# Patient Record
Sex: MALE | Race: WHITE | NOT HISPANIC OR LATINO | Employment: OTHER | URBAN - METROPOLITAN AREA
[De-identification: names, ages, dates, MRNs, and addresses within clinical notes are randomized per-mention and may not be internally consistent; named-entity substitution may affect disease eponyms.]

---

## 2017-11-06 ENCOUNTER — HOSPITAL ENCOUNTER (EMERGENCY)
Facility: MEDICAL CENTER | Age: 67
End: 2017-11-06
Attending: EMERGENCY MEDICINE
Payer: MEDICARE

## 2017-11-06 VITALS
RESPIRATION RATE: 16 BRPM | SYSTOLIC BLOOD PRESSURE: 132 MMHG | OXYGEN SATURATION: 97 % | DIASTOLIC BLOOD PRESSURE: 85 MMHG | HEART RATE: 95 BPM | HEIGHT: 69 IN | BODY MASS INDEX: 23.67 KG/M2 | WEIGHT: 159.83 LBS | TEMPERATURE: 98 F

## 2017-11-06 DIAGNOSIS — Z76.0 MEDICATION REFILL: ICD-10-CM

## 2017-11-06 PROCEDURE — 99284 EMERGENCY DEPT VISIT MOD MDM: CPT

## 2017-11-06 RX ORDER — TRAZODONE HYDROCHLORIDE 150 MG/1
150 TABLET ORAL EVERY EVENING
Qty: 15 TAB | Refills: 0 | Status: SHIPPED | OUTPATIENT
Start: 2017-11-06

## 2017-11-06 ASSESSMENT — PAIN SCALES - GENERAL: PAINLEVEL_OUTOF10: 0

## 2017-11-06 NOTE — ED PROVIDER NOTES
"ED Provider Note    CHIEF COMPLAINT  Chief Complaint   Patient presents with   • Medication Refill     Patient states \"I need to take my Trazodone twice a day\"       HPI  Morgan Davis is a 67 y.o. male who presents requesting refill on his trazodone.  He states he has been taking it twice a day which helps \"both his head and his back\".  He denies adverse side effects from this, states he is talking to his primary doctor regarding having the official prescription switched to twice a day.  No other complaints at this time.  He denies depression or suicidal ideation at this time.    REVIEW OF SYSTEMS  Constitutional: No fever  Respiratory: No shortness of breath  Psychiatric denies suicidal ideation  Gastrointestinal: No abdominal pain  Musculoskeletal: No back pain    PAST MEDICAL HISTORY  Past Medical History:   Diagnosis Date   • Hyperlipidemia    • Hypertension    • Schizophrenia (CMS-HCC)    • Thyroid disease        FAMILY HISTORY  History reviewed. No pertinent family history.    SOCIAL HISTORY  Social History     Social History   • Marital status: Single     Spouse name: N/A   • Number of children: N/A   • Years of education: N/A     Social History Main Topics   • Smoking status: Current Every Day Smoker     Packs/day: 1.00   • Smokeless tobacco: Never Used   • Alcohol use No   • Drug use: No   • Sexual activity: Not on file     Other Topics Concern   • Not on file     Social History Narrative   • No narrative on file       SURGICAL HISTORY  History reviewed. No pertinent surgical history.    CURRENT MEDICATIONS  No current facility-administered medications on file prior to encounter.      Current Outpatient Prescriptions on File Prior to Encounter   Medication Sig Dispense Refill   • acetaminophen 650 MG Tab Take 650 mg by mouth every 6 hours as needed (Mild Pain; (Pain scale 1-3); Temp greater than 100.5 F). 30 Tab 0   • aspirin (ASA) 81 MG Chew Tab chewable tablet Take 1 Tab by mouth every day. 100 Tab 11 " "  • cyanocobalamin (VITAMIN B12) 1000 MCG Tab Take 1 Tab by mouth every day. 30 Tab 3   • multivitamin (THERAGRAN) Tab Take 1 Tab by mouth every day. 30 Tab    • vitamin D, Ergocalciferol, (DRISDOL) 99733 UNITS Cap capsule Take 1 Cap by mouth every 7 days. 30 Cap    • levothyroxine (SYNTHROID) 75 MCG Tab Take 37.5 mcg by mouth Every morning on an empty stomach.     • famotidine (PEPCID) 20 MG Tab Take 20 mg by mouth 2 times a day.     • docusate sodium (COLACE) 100 MG Cap Take 100 mg by mouth 2 times a day.     • thiamine (THIAMINE) 100 MG Tab Take 100 mg by mouth every day.     • trazodone (DESYREL) 150 MG TABS Take 150 mg by mouth every bedtime.     • lisinopril (PRINIVIL) 10 MG TABS Take 10 mg by mouth every day.     • olanzapine (ZYPREXA) 20 MG tablet Take 20 mg by mouth every bedtime.         ALLERGIES  Allergies   Allergen Reactions   • Nkda [No Known Drug Allergy]        PHYSICAL EXAM  VITAL SIGNS: /85   Pulse 95   Temp 36.7 °C (98 °F) (Temporal)   Resp 16   Ht 1.753 m (5' 9\")   Wt 72.5 kg (159 lb 13.3 oz)   SpO2 97%   BMI 23.60 kg/m²   Constitutional:  Well nourished, No acute distress.   HENT:Atraumatic  Eyes:  Conjunctiva normal, No discharge.    Cardiovascular: The heart is regular, no murmurs  Pulmonary: Lungs are clear with good air movement, no wheezing or rales  Skin: No cyanosis.   Musculoskeletal: Neck nontender   Neurologic: speech is clear, no ataxia   Psychiatric:  Mood normal.  Patient, and cooperative      COURSE & MEDICAL DECISION MAKING  Pertinent Labs & Imaging studies reviewed. (See chart for details)  Patient given refill for 2 weeks of his current trazodone prescription, advised to follow up his primary doctor for discussion of once or twice a day dosing.    FINAL IMPRESSION     1. Medication refill                 Electronically signed by: Gary Sherman, 11/6/2017 1:40 PM    "

## 2017-11-06 NOTE — ED NOTES
".  Chief Complaint   Patient presents with   • Medication Refill     Patient states \"I need to take my Trazodone twice a day\"     ./87   Pulse (!) 114   Temp 36.7 °C (98 °F) (Temporal)   Resp 14   Ht 1.753 m (5' 9\")   Wt 72.5 kg (159 lb 13.3 oz)   SpO2 97%   BMI 23.60 kg/m²     BIB EMS for medication change request  "

## 2017-11-06 NOTE — ED NOTES
Pt amb to room w/o difficulty, adamant that he needs his trazadone twice a day. Repeatedly stating so

## 2018-01-18 ENCOUNTER — HOSPITAL ENCOUNTER (OUTPATIENT)
Dept: LAB | Facility: MEDICAL CENTER | Age: 68
End: 2018-01-18
Attending: PSYCHIATRY & NEUROLOGY
Payer: MEDICARE

## 2018-01-18 LAB
ALBUMIN SERPL BCP-MCNC: 4.2 G/DL (ref 3.2–4.9)
ALBUMIN/GLOB SERPL: 1.5 G/DL
ALP SERPL-CCNC: 90 U/L (ref 30–99)
ALT SERPL-CCNC: 16 U/L (ref 2–50)
ANION GAP SERPL CALC-SCNC: 5 MMOL/L (ref 0–11.9)
AST SERPL-CCNC: 18 U/L (ref 12–45)
BASOPHILS # BLD AUTO: 0.5 % (ref 0–1.8)
BASOPHILS # BLD: 0.04 K/UL (ref 0–0.12)
BILIRUB SERPL-MCNC: 0.7 MG/DL (ref 0.1–1.5)
BUN SERPL-MCNC: 16 MG/DL (ref 8–22)
CALCIUM SERPL-MCNC: 10.1 MG/DL (ref 8.5–10.5)
CHLORIDE SERPL-SCNC: 104 MMOL/L (ref 96–112)
CHOLEST SERPL-MCNC: 168 MG/DL (ref 100–199)
CO2 SERPL-SCNC: 25 MMOL/L (ref 20–33)
CREAT SERPL-MCNC: 0.99 MG/DL (ref 0.5–1.4)
EOSINOPHIL # BLD AUTO: 0.17 K/UL (ref 0–0.51)
EOSINOPHIL NFR BLD: 2.1 % (ref 0–6.9)
ERYTHROCYTE [DISTWIDTH] IN BLOOD BY AUTOMATED COUNT: 46.9 FL (ref 35.9–50)
EST. AVERAGE GLUCOSE BLD GHB EST-MCNC: 117 MG/DL
GLOBULIN SER CALC-MCNC: 2.8 G/DL (ref 1.9–3.5)
GLUCOSE SERPL-MCNC: 110 MG/DL (ref 65–99)
HBA1C MFR BLD: 5.7 % (ref 0–5.6)
HCT VFR BLD AUTO: 53.9 % (ref 42–52)
HDLC SERPL-MCNC: 65 MG/DL
HGB BLD-MCNC: 18.3 G/DL (ref 14–18)
IMM GRANULOCYTES # BLD AUTO: 0.02 K/UL (ref 0–0.11)
IMM GRANULOCYTES NFR BLD AUTO: 0.2 % (ref 0–0.9)
LDLC SERPL CALC-MCNC: 88 MG/DL
LYMPHOCYTES # BLD AUTO: 2.38 K/UL (ref 1–4.8)
LYMPHOCYTES NFR BLD: 29.1 % (ref 22–41)
MCH RBC QN AUTO: 31.9 PG (ref 27–33)
MCHC RBC AUTO-ENTMCNC: 34 G/DL (ref 33.7–35.3)
MCV RBC AUTO: 94.1 FL (ref 81.4–97.8)
MONOCYTES # BLD AUTO: 0.65 K/UL (ref 0–0.85)
MONOCYTES NFR BLD AUTO: 7.9 % (ref 0–13.4)
NEUTROPHILS # BLD AUTO: 4.92 K/UL (ref 1.82–7.42)
NEUTROPHILS NFR BLD: 60.2 % (ref 44–72)
NRBC # BLD AUTO: 0 K/UL
NRBC BLD-RTO: 0 /100 WBC
PLATELET # BLD AUTO: 177 K/UL (ref 164–446)
PMV BLD AUTO: 10.2 FL (ref 9–12.9)
POTASSIUM SERPL-SCNC: 4.7 MMOL/L (ref 3.6–5.5)
PROT SERPL-MCNC: 7 G/DL (ref 6–8.2)
RBC # BLD AUTO: 5.73 M/UL (ref 4.7–6.1)
SODIUM SERPL-SCNC: 134 MMOL/L (ref 135–145)
T4 FREE SERPL-MCNC: 1.55 NG/DL (ref 0.53–1.43)
TRIGL SERPL-MCNC: 75 MG/DL (ref 0–149)
TSH SERPL DL<=0.005 MIU/L-ACNC: 1.33 UIU/ML (ref 0.38–5.33)
WBC # BLD AUTO: 8.2 K/UL (ref 4.8–10.8)

## 2018-01-18 PROCEDURE — 80061 LIPID PANEL: CPT | Mod: GA

## 2018-01-18 PROCEDURE — 85025 COMPLETE CBC W/AUTO DIFF WBC: CPT

## 2018-01-18 PROCEDURE — 83036 HEMOGLOBIN GLYCOSYLATED A1C: CPT | Mod: GA

## 2018-01-18 PROCEDURE — 36415 COLL VENOUS BLD VENIPUNCTURE: CPT

## 2018-01-18 PROCEDURE — 84439 ASSAY OF FREE THYROXINE: CPT | Mod: GA

## 2018-01-18 PROCEDURE — 80053 COMPREHEN METABOLIC PANEL: CPT

## 2018-01-18 PROCEDURE — 84443 ASSAY THYROID STIM HORMONE: CPT | Mod: GA

## 2019-09-18 ENCOUNTER — TELEPHONE (OUTPATIENT)
Dept: HEMATOLOGY ONCOLOGY | Facility: MEDICAL CENTER | Age: 69
End: 2019-09-18

## 2019-09-18 NOTE — TELEPHONE ENCOUNTER
Received referral to lung cancer screening program.  Chart review to assess for lung cancer screening program eligibility.   1. Age 55-77 yrs of age? Yes 68 y.o.  2. 30 pack year hx of smoking, or greater? Yes 1 [id number]= [id number] hx  3. Current smoker or if quit, has pt quit within last 15 yrs?Yes  Current smoker  4. Any signs or symptoms of lung cancer? None noted  5. Previous history of lung cancer? None noted  6. Chest CT within past 12 mos.? None noted  Patient does meet eligibility criteria. [facility] scheduling notified to schedule the shared decision making visit.

## 2019-09-26 ENCOUNTER — APPOINTMENT (OUTPATIENT)
Dept: HEMATOLOGY ONCOLOGY | Facility: MEDICAL CENTER | Age: 69
End: 2019-09-26
Payer: MEDICARE

## 2019-09-26 ENCOUNTER — TELEPHONE (OUTPATIENT)
Dept: HEMATOLOGY ONCOLOGY | Facility: MEDICAL CENTER | Age: 69
End: 2019-09-26

## 2019-09-26 NOTE — TELEPHONE ENCOUNTER
Patient's , Simone Rider called asking about Morgan's appointment today. Asked to speak directly with patient. Patient asked for clarification of what his appointment was for and asked to be rescheduled to a morning appointment. Patient does not have transportation in the afternoons. Moved patient's appointment to the morning of 10/1/19

## 2019-10-01 ENCOUNTER — OFFICE VISIT (OUTPATIENT)
Dept: HEMATOLOGY ONCOLOGY | Facility: MEDICAL CENTER | Age: 69
End: 2019-10-01
Payer: MEDICARE

## 2019-10-01 VITALS
BODY MASS INDEX: 20.33 KG/M2 | HEART RATE: 92 BPM | SYSTOLIC BLOOD PRESSURE: 122 MMHG | RESPIRATION RATE: 16 BRPM | WEIGHT: 141.98 LBS | DIASTOLIC BLOOD PRESSURE: 74 MMHG | HEIGHT: 70 IN | OXYGEN SATURATION: 95 % | TEMPERATURE: 99 F

## 2019-10-01 DIAGNOSIS — F17.210 CIGARETTE SMOKER: ICD-10-CM

## 2019-10-01 PROCEDURE — G0296 VISIT TO DETERM LDCT ELIG: HCPCS | Performed by: NURSE PRACTITIONER

## 2019-10-01 RX ORDER — FOLIC ACID 1 MG/1
1 TABLET ORAL EVERY MORNING
Status: ON HOLD | COMMUNITY
End: 2021-06-30

## 2019-10-01 RX ORDER — SIMVASTATIN 20 MG
20 TABLET ORAL NIGHTLY
COMMUNITY
End: 2021-06-20

## 2019-10-01 RX ORDER — TIOTROPIUM BROMIDE 18 UG/1
18 CAPSULE ORAL; RESPIRATORY (INHALATION) DAILY
Status: ON HOLD | COMMUNITY
End: 2021-06-30

## 2019-10-01 ASSESSMENT — ENCOUNTER SYMPTOMS
WEIGHT LOSS: 0
HEMOPTYSIS: 0
SHORTNESS OF BREATH: 1
COUGH: 1
WHEEZING: 1
SPUTUM PRODUCTION: 0

## 2019-10-01 ASSESSMENT — PAIN SCALES - GENERAL: PAINLEVEL: NO PAIN

## 2019-10-01 NOTE — PROGRESS NOTES
Subjective:      Morgan Davis is a 68 y.o. male who presents for Lung Cancer Screening Program Prescreen (Dx: Nicotine Dependence Ref: Nia Gibson MD) for lung cancer screening shared decision making visit.       HPI   Patient seen today for initial lung cancer screening visit. Patient referred by PCP, Dr. Nia Ivory.  Patient is accompanied by caregiver from his group home.    The patient meets eligibility criteria including age, smoking history (30+ pack years), if former smoker, quit in the last 15 years, and absence of signs or symptoms of lung cancer.    - Age - 68  - Smoking history - Patient has smoked for 54 years at an average of 1 ppd = 54 pack year smoking history.  - Current smoking status - current smoker  - No symptoms of lung cancer and no previous history of lung cancer         Allergies   Allergen Reactions   • Nkda [No Known Drug Allergy]          Current Outpatient Medications on File Prior to Visit   Medication Sig Dispense Refill   • folic acid (FOLVITE) 1 MG Tab Take 1 mg by mouth every day.     • simvastatin (ZOCOR) 20 MG Tab Take 20 mg by mouth every evening.     • tiotropium (SPIRIVA HANDIHALER) 18 MCG Cap Inhale 18 mcg by mouth every day.     • trazodone (DESYREL) 150 MG Tab Take 1 Tab by mouth every evening. 15 Tab 0   • aspirin (ASA) 81 MG Chew Tab chewable tablet Take 1 Tab by mouth every day. 100 Tab 11   • cyanocobalamin (VITAMIN B12) 1000 MCG Tab Take 1 Tab by mouth every day. 30 Tab 3   • multivitamin (THERAGRAN) Tab Take 1 Tab by mouth every day. 30 Tab    • levothyroxine (SYNTHROID) 75 MCG Tab Take 37.5 mcg by mouth Every morning on an empty stomach.     • famotidine (PEPCID) 20 MG Tab Take 20 mg by mouth 2 times a day.     • docusate sodium (COLACE) 100 MG Cap Take 100 mg by mouth 2 times a day.     • trazodone (DESYREL) 150 MG TABS Take 150 mg by mouth every bedtime.     • lisinopril (PRINIVIL) 10 MG TABS Take 5 mg by mouth every day.     • olanzapine (ZYPREXA) 20 MG  "tablet Take 15 mg by mouth every bedtime.     • acetaminophen 650 MG Tab Take 650 mg by mouth every 6 hours as needed (Mild Pain; (Pain scale 1-3); Temp greater than 100.5 F). 30 Tab 0   • vitamin D, Ergocalciferol, (DRISDOL) 68376 UNITS Cap capsule Take 1 Cap by mouth every 7 days. 30 Cap    • thiamine (THIAMINE) 100 MG Tab Take 100 mg by mouth every day.       No current facility-administered medications on file prior to visit.            Review of Systems   Constitutional: Negative for malaise/fatigue and weight loss.   Respiratory: Positive for cough, shortness of breath (with exertion) and wheezing (clears with cough). Negative for hemoptysis and sputum production.           Objective:     /74 (BP Location: Right arm, Patient Position: Sitting, BP Cuff Size: Adult)   Pulse 92   Temp 37.2 °C (99 °F) (Temporal)   Resp 16   Ht 1.778 m (5' 10\")   Wt 64.4 kg (141 lb 15.6 oz)   SpO2 95%   BMI 20.37 kg/m²        Physical Exam   Constitutional: He is oriented to person, place, and time. He appears well-developed and well-nourished. No distress.   Cardiovascular: Normal rate, regular rhythm and normal heart sounds. Exam reveals no gallop and no friction rub.   No murmur heard.  Pulmonary/Chest: Effort normal. No respiratory distress. He has wheezes (RUL - cleared with cough).   Musculoskeletal: Normal range of motion.   Neurological: He is alert and oriented to person, place, and time.   Skin: Skin is warm and dry. He is not diaphoretic.   Vitals reviewed.         Assessment/Plan:     1. Cigarette smoker  CT-LUNG CANCER-SCREENING         We conducted a shared decision-making process using a decision aid. We reviewed benefits and harms of screening, including false positives and potential need for additional diagnostic testing, the possibility of over diagnosis, and total radiation exposure.    We discussed the importance of adhering to annual LDCT screening. We also discussed the impact of comorbities on " the patient's the ability or willingness to undergo diagnostic procedure(s) and treatment.    Counseling on the importance of maintaining cigarette smoking abstinence if former smoker; or the importance of smoking cessation if current smoker and, if appropriate, furnishing of information about tobacco cessation interventions. I provided patient with smoking cessation materials and resources within [facility] and the community. Patient appreciative of the resources.     Based on our discussion, we have decided to begin annual lung cancer screening starting now.

## 2020-05-04 ENCOUNTER — NON-PROVIDER VISIT (OUTPATIENT)
Dept: URGENT CARE | Facility: PHYSICIAN GROUP | Age: 70
End: 2020-05-04

## 2020-05-04 DIAGNOSIS — Z11.1 PPD SCREENING TEST: Primary | ICD-10-CM

## 2020-05-04 PROCEDURE — 86580 TB INTRADERMAL TEST: CPT | Performed by: NURSE PRACTITIONER

## 2020-05-05 NOTE — NON-PROVIDER
Morgan Davis is a 69 y.o. male here for a non-provider visit for PPD placement -- Step 1 of 1    Reason for PPD:  group home requirement    1. TB evaluation questionnaire completed by patient? Yes      -  If any answers marked yes did you contact a provider prior to placing? Not Indicated  2.  Patient notified to return to clinic for reading on: 5/6/2020 Weds or 5/7/2020 Thurs  3.  PPD Placement documentation completed on TB evaluation questionnaire? Yes  4.  Location of TB evaluation questionnaire filed:

## 2020-05-06 ENCOUNTER — NON-PROVIDER VISIT (OUTPATIENT)
Dept: URGENT CARE | Facility: PHYSICIAN GROUP | Age: 70
End: 2020-05-06

## 2020-05-06 LAB — TB WHEAL 3D P 5 TU DIAM: NORMAL MM

## 2020-05-13 ENCOUNTER — NON-PROVIDER VISIT (OUTPATIENT)
Dept: URGENT CARE | Facility: PHYSICIAN GROUP | Age: 70
End: 2020-05-13

## 2020-05-13 DIAGNOSIS — Z11.1 PPD SCREENING TEST: Primary | ICD-10-CM

## 2020-05-13 PROCEDURE — 86580 TB INTRADERMAL TEST: CPT | Performed by: NURSE PRACTITIONER

## 2020-05-13 NOTE — NON-PROVIDER
Morgan Davis is a 69 y.o. male here for a non-provider visit for PPD placement -- Step 2 of 2    Reason for PPD:  nursing home requirement    1. TB evaluation questionnaire completed by patient? Yes      -  If any answers marked yes did you contact a provider prior to placing? Not Indicated  2.  Patient notified to return to clinic for reading on: 5/15/2020 Fri or 5/16/2020 Sat   3.  PPD Placement documentation completed on TB evaluation questionnaire? Yes  4.  Location of TB evaluation questionnaire filed:

## 2020-05-16 ENCOUNTER — NON-PROVIDER VISIT (OUTPATIENT)
Dept: URGENT CARE | Facility: PHYSICIAN GROUP | Age: 70
End: 2020-05-16

## 2020-05-16 LAB — TB WHEAL 3D P 5 TU DIAM: 0 MM

## 2020-05-16 NOTE — NON-PROVIDER
Morgan Davis is a 69 y.o. male here for a non-provider visit for PPD reading -- Step 2 of 2.      1.  Resulted in Epic under enter/edit results? Yes   2.  TB evaluation questionnaire scanned into chart and original given to patient?Yes      3. Was induration greater than 0 mm? No.      Routed to PCP? No

## 2021-01-15 DIAGNOSIS — Z23 NEED FOR VACCINATION: ICD-10-CM

## 2021-04-12 ENCOUNTER — NON-PROVIDER VISIT (OUTPATIENT)
Dept: OCCUPATIONAL MEDICINE | Facility: CLINIC | Age: 71
End: 2021-04-12

## 2021-04-12 DIAGNOSIS — Z11.1 ENCOUNTER FOR PPD TEST: Primary | ICD-10-CM

## 2021-04-12 PROCEDURE — 86580 TB INTRADERMAL TEST: CPT | Performed by: NURSE PRACTITIONER

## 2021-04-14 ENCOUNTER — NON-PROVIDER VISIT (OUTPATIENT)
Dept: OCCUPATIONAL MEDICINE | Facility: CLINIC | Age: 71
End: 2021-04-14

## 2021-04-14 DIAGNOSIS — Z11.1 ENCOUNTER FOR PPD SKIN TEST READING: ICD-10-CM

## 2021-04-14 LAB — TB WHEAL 3D P 5 TU DIAM: NORMAL MM

## 2021-06-20 ENCOUNTER — APPOINTMENT (OUTPATIENT)
Dept: RADIOLOGY | Facility: MEDICAL CENTER | Age: 71
DRG: 023 | End: 2021-06-20
Attending: STUDENT IN AN ORGANIZED HEALTH CARE EDUCATION/TRAINING PROGRAM
Payer: MEDICARE

## 2021-06-20 ENCOUNTER — APPOINTMENT (OUTPATIENT)
Dept: RADIOLOGY | Facility: MEDICAL CENTER | Age: 71
DRG: 023 | End: 2021-06-20
Attending: EMERGENCY MEDICINE
Payer: MEDICARE

## 2021-06-20 ENCOUNTER — APPOINTMENT (OUTPATIENT)
Dept: RADIOLOGY | Facility: MEDICAL CENTER | Age: 71
DRG: 023 | End: 2021-06-20
Attending: INTERNAL MEDICINE
Payer: MEDICARE

## 2021-06-20 ENCOUNTER — HOSPITAL ENCOUNTER (INPATIENT)
Facility: MEDICAL CENTER | Age: 71
LOS: 10 days | DRG: 023 | End: 2021-06-30
Attending: EMERGENCY MEDICINE | Admitting: INTERNAL MEDICINE
Payer: MEDICARE

## 2021-06-20 DIAGNOSIS — I63.412 CEREBROVASCULAR ACCIDENT (CVA) DUE TO EMBOLISM OF LEFT MIDDLE CEREBRAL ARTERY (HCC): ICD-10-CM

## 2021-06-20 DIAGNOSIS — Z51.5 HOSPICE CARE: ICD-10-CM

## 2021-06-20 DIAGNOSIS — I63.9 ACUTE ISCHEMIC STROKE (HCC): ICD-10-CM

## 2021-06-20 PROBLEM — I10 HYPERTENSION: Status: ACTIVE | Noted: 2021-06-20

## 2021-06-20 PROBLEM — E11.9 TYPE 2 DIABETES MELLITUS, WITHOUT LONG-TERM CURRENT USE OF INSULIN (HCC): Status: ACTIVE | Noted: 2021-06-20

## 2021-06-20 PROBLEM — E78.5 HYPERLIPIDEMIA: Status: ACTIVE | Noted: 2021-06-20

## 2021-06-20 LAB
ABO + RH BLD: NORMAL
ABO GROUP BLD: NORMAL
ALBUMIN SERPL BCP-MCNC: 3.9 G/DL (ref 3.2–4.9)
ALBUMIN/GLOB SERPL: 1.8 G/DL
ALP SERPL-CCNC: 71 U/L (ref 30–99)
ALT SERPL-CCNC: 17 U/L (ref 2–50)
ANION GAP SERPL CALC-SCNC: 10 MMOL/L (ref 7–16)
ANION GAP SERPL CALC-SCNC: 10 MMOL/L (ref 7–16)
APPEARANCE UR: CLEAR
APTT PPP: 23.5 SEC (ref 24.7–36)
AST SERPL-CCNC: 19 U/L (ref 12–45)
BASE EXCESS BLDA CALC-SCNC: -4 MMOL/L (ref -4–3)
BASE EXCESS BLDA CALC-SCNC: -5 MMOL/L (ref -4–3)
BASOPHILS # BLD AUTO: 0.1 % (ref 0–1.8)
BASOPHILS # BLD: 0.01 K/UL (ref 0–0.12)
BILIRUB SERPL-MCNC: 0.6 MG/DL (ref 0.1–1.5)
BILIRUB UR QL STRIP.AUTO: NEGATIVE
BLD GP AB SCN SERPL QL: NORMAL
BODY TEMPERATURE: ABNORMAL DEGREES
BODY TEMPERATURE: ABNORMAL DEGREES
BREATHS SETTING VENT: 18
BUN SERPL-MCNC: 11 MG/DL (ref 8–22)
BUN SERPL-MCNC: 11 MG/DL (ref 8–22)
CALCIUM SERPL-MCNC: 8.6 MG/DL (ref 8.5–10.5)
CALCIUM SERPL-MCNC: 9 MG/DL (ref 8.5–10.5)
CHLORIDE SERPL-SCNC: 104 MMOL/L (ref 96–112)
CHLORIDE SERPL-SCNC: 106 MMOL/L (ref 96–112)
CO2 BLDA-SCNC: 16 MMOL/L (ref 20–33)
CO2 BLDA-SCNC: 19 MMOL/L (ref 20–33)
CO2 SERPL-SCNC: 23 MMOL/L (ref 20–33)
CO2 SERPL-SCNC: 23 MMOL/L (ref 20–33)
COLOR UR: YELLOW
CREAT SERPL-MCNC: 0.95 MG/DL (ref 0.5–1.4)
CREAT SERPL-MCNC: 0.96 MG/DL (ref 0.5–1.4)
DELSYS IDSYS: ABNORMAL
EKG IMPRESSION: NORMAL
EKG IMPRESSION: NORMAL
EOSINOPHIL # BLD AUTO: 0.35 K/UL (ref 0–0.51)
EOSINOPHIL NFR BLD: 4.6 % (ref 0–6.9)
ERYTHROCYTE [DISTWIDTH] IN BLOOD BY AUTOMATED COUNT: 48.2 FL (ref 35.9–50)
EST. AVERAGE GLUCOSE BLD GHB EST-MCNC: 123 MG/DL
GLOBULIN SER CALC-MCNC: 2.2 G/DL (ref 1.9–3.5)
GLUCOSE BLD-MCNC: 121 MG/DL (ref 65–99)
GLUCOSE BLD-MCNC: 153 MG/DL (ref 65–99)
GLUCOSE BLD-MCNC: 170 MG/DL (ref 65–99)
GLUCOSE SERPL-MCNC: 131 MG/DL (ref 65–99)
GLUCOSE SERPL-MCNC: 208 MG/DL (ref 65–99)
GLUCOSE UR STRIP.AUTO-MCNC: NEGATIVE MG/DL
HBA1C MFR BLD: 5.9 % (ref 4–5.6)
HCO3 BLDA-SCNC: 15.7 MMOL/L (ref 17–25)
HCO3 BLDA-SCNC: 18.2 MMOL/L (ref 17–25)
HCT VFR BLD AUTO: 49.5 % (ref 42–52)
HGB BLD-MCNC: 16.5 G/DL (ref 14–18)
HOROWITZ INDEX BLDA+IHG-RTO: 96 MM[HG]
IMM GRANULOCYTES # BLD AUTO: 0.04 K/UL (ref 0–0.11)
IMM GRANULOCYTES NFR BLD AUTO: 0.5 % (ref 0–0.9)
INR PPP: 1.03 (ref 0.87–1.13)
KETONES UR STRIP.AUTO-MCNC: NEGATIVE MG/DL
LEUKOCYTE ESTERASE UR QL STRIP.AUTO: NEGATIVE
LITHIUM SERPL-MCNC: <0.1 MMOL/L (ref 0.6–1.2)
LYMPHOCYTES # BLD AUTO: 3.07 K/UL (ref 1–4.8)
LYMPHOCYTES NFR BLD: 40.7 % (ref 22–41)
MAGNESIUM SERPL-MCNC: 1.7 MG/DL (ref 1.5–2.5)
MCH RBC QN AUTO: 31.9 PG (ref 27–33)
MCHC RBC AUTO-ENTMCNC: 33.3 G/DL (ref 33.7–35.3)
MCV RBC AUTO: 95.6 FL (ref 81.4–97.8)
MICRO URNS: NORMAL
MODE IMODE: ABNORMAL
MONOCYTES # BLD AUTO: 0.66 K/UL (ref 0–0.85)
MONOCYTES NFR BLD AUTO: 8.7 % (ref 0–13.4)
NEUTROPHILS # BLD AUTO: 3.42 K/UL (ref 1.82–7.42)
NEUTROPHILS NFR BLD: 45.4 % (ref 44–72)
NITRITE UR QL STRIP.AUTO: NEGATIVE
NRBC # BLD AUTO: 0 K/UL
NRBC BLD-RTO: 0 /100 WBC
O2/TOTAL GAS SETTING VFR VENT: 100 %
PCO2 BLDA: 21.4 MMHG (ref 26–37)
PCO2 BLDA: 25.6 MMHG (ref 26–37)
PCO2 TEMP ADJ BLDA: 23.4 MMHG (ref 26–37)
PCO2 TEMP ADJ BLDA: 26.5 MMHG (ref 26–37)
PEEP END EXPIRATORY PRESSURE IPEEP: 18 CMH20
PH BLDA: 7.46 [PH] (ref 7.4–7.5)
PH BLDA: 7.47 [PH] (ref 7.4–7.5)
PH TEMP ADJ BLDA: 7.44 [PH] (ref 7.4–7.5)
PH TEMP ADJ BLDA: 7.45 [PH] (ref 7.4–7.5)
PH UR STRIP.AUTO: 7 [PH] (ref 5–8)
PLATELET # BLD AUTO: 159 K/UL (ref 164–446)
PMV BLD AUTO: 10 FL (ref 9–12.9)
PO2 BLDA: 58 MMHG (ref 64–87)
PO2 BLDA: 96 MMHG (ref 64–87)
PO2 TEMP ADJ BLDA: 108 MMHG (ref 64–87)
PO2 TEMP ADJ BLDA: 61 MMHG (ref 64–87)
POTASSIUM SERPL-SCNC: 3.8 MMOL/L (ref 3.6–5.5)
POTASSIUM SERPL-SCNC: 4 MMOL/L (ref 3.6–5.5)
PROT SERPL-MCNC: 6.1 G/DL (ref 6–8.2)
PROT UR QL STRIP: NEGATIVE MG/DL
PROTHROMBIN TIME: 13.2 SEC (ref 12–14.6)
RBC # BLD AUTO: 5.18 M/UL (ref 4.7–6.1)
RBC UR QL AUTO: NEGATIVE
RH BLD: NORMAL
SAO2 % BLDA: 92 % (ref 93–99)
SAO2 % BLDA: 98 % (ref 93–99)
SARS-COV+SARS-COV-2 AG RESP QL IA.RAPID: NOTDETECTED
SARS-COV-2 RNA RESP QL NAA+PROBE: NOTDETECTED
SODIUM SERPL-SCNC: 137 MMOL/L (ref 135–145)
SODIUM SERPL-SCNC: 139 MMOL/L (ref 135–145)
SP GR UR STRIP.AUTO: 1.01
SPECIMEN DRAWN FROM PATIENT: ABNORMAL
SPECIMEN DRAWN FROM PATIENT: ABNORMAL
SPECIMEN SOURCE: NORMAL
SPECIMEN SOURCE: NORMAL
TIDAL VOLUME IVT: 440 ML
TROPONIN T SERPL-MCNC: 11 NG/L (ref 6–19)
TROPONIN T SERPL-MCNC: 21 NG/L (ref 6–19)
TSH SERPL DL<=0.005 MIU/L-ACNC: 2.18 UIU/ML (ref 0.38–5.33)
UROBILINOGEN UR STRIP.AUTO-MCNC: 1 MG/DL
WBC # BLD AUTO: 7.6 K/UL (ref 4.8–10.8)

## 2021-06-20 PROCEDURE — 700111 HCHG RX REV CODE 636 W/ 250 OVERRIDE (IP): Performed by: INTERNAL MEDICINE

## 2021-06-20 PROCEDURE — 85025 COMPLETE CBC W/AUTO DIFF WBC: CPT

## 2021-06-20 PROCEDURE — 700101 HCHG RX REV CODE 250: Performed by: STUDENT IN AN ORGANIZED HEALTH CARE EDUCATION/TRAINING PROGRAM

## 2021-06-20 PROCEDURE — A9270 NON-COVERED ITEM OR SERVICE: HCPCS | Performed by: STUDENT IN AN ORGANIZED HEALTH CARE EDUCATION/TRAINING PROGRAM

## 2021-06-20 PROCEDURE — 31500 INSERT EMERGENCY AIRWAY: CPT | Performed by: INTERNAL MEDICINE

## 2021-06-20 PROCEDURE — 86901 BLOOD TYPING SEROLOGIC RH(D): CPT

## 2021-06-20 PROCEDURE — 82803 BLOOD GASES ANY COMBINATION: CPT | Mod: 91

## 2021-06-20 PROCEDURE — 70450 CT HEAD/BRAIN W/O DYE: CPT | Mod: MG

## 2021-06-20 PROCEDURE — 71045 X-RAY EXAM CHEST 1 VIEW: CPT

## 2021-06-20 PROCEDURE — 700105 HCHG RX REV CODE 258: Performed by: STUDENT IN AN ORGANIZED HEALTH CARE EDUCATION/TRAINING PROGRAM

## 2021-06-20 PROCEDURE — 90471 IMMUNIZATION ADMIN: CPT

## 2021-06-20 PROCEDURE — C1773 RET DEV, INSERTABLE: HCPCS

## 2021-06-20 PROCEDURE — 700111 HCHG RX REV CODE 636 W/ 250 OVERRIDE (IP)

## 2021-06-20 PROCEDURE — 84478 ASSAY OF TRIGLYCERIDES: CPT

## 2021-06-20 PROCEDURE — 99291 CRITICAL CARE FIRST HOUR: CPT

## 2021-06-20 PROCEDURE — 03CG3Z7 EXTIRPATION OF MATTER FROM INTRACRANIAL ARTERY USING STENT RETRIEVER, PERCUTANEOUS APPROACH: ICD-10-PCS | Performed by: RADIOLOGY

## 2021-06-20 PROCEDURE — 700111 HCHG RX REV CODE 636 W/ 250 OVERRIDE (IP): Performed by: STUDENT IN AN ORGANIZED HEALTH CARE EDUCATION/TRAINING PROGRAM

## 2021-06-20 PROCEDURE — 93005 ELECTROCARDIOGRAM TRACING: CPT | Performed by: INTERNAL MEDICINE

## 2021-06-20 PROCEDURE — 82962 GLUCOSE BLOOD TEST: CPT | Mod: 91

## 2021-06-20 PROCEDURE — 92610 EVALUATE SWALLOWING FUNCTION: CPT

## 2021-06-20 PROCEDURE — 80178 ASSAY OF LITHIUM: CPT

## 2021-06-20 PROCEDURE — 700102 HCHG RX REV CODE 250 W/ 637 OVERRIDE(OP): Performed by: INTERNAL MEDICINE

## 2021-06-20 PROCEDURE — U0003 INFECTIOUS AGENT DETECTION BY NUCLEIC ACID (DNA OR RNA); SEVERE ACUTE RESPIRATORY SYNDROME CORONAVIRUS 2 (SARS-COV-2) (CORONAVIRUS DISEASE [COVID-19]), AMPLIFIED PROBE TECHNIQUE, MAKING USE OF HIGH THROUGHPUT TECHNOLOGIES AS DESCRIBED BY CMS-2020-01-R: HCPCS

## 2021-06-20 PROCEDURE — C1751 CATH, INF, PER/CENT/MIDLINE: HCPCS

## 2021-06-20 PROCEDURE — 5A1955Z RESPIRATORY VENTILATION, GREATER THAN 96 CONSECUTIVE HOURS: ICD-10-PCS | Performed by: INTERNAL MEDICINE

## 2021-06-20 PROCEDURE — C1887 CATHETER, GUIDING: HCPCS

## 2021-06-20 PROCEDURE — B54MZZA ULTRASONOGRAPHY OF RIGHT UPPER EXTREMITY VEINS, GUIDANCE: ICD-10-PCS | Performed by: INTERNAL MEDICINE

## 2021-06-20 PROCEDURE — 80053 COMPREHEN METABOLIC PANEL: CPT

## 2021-06-20 PROCEDURE — 87040 BLOOD CULTURE FOR BACTERIA: CPT

## 2021-06-20 PROCEDURE — 31500 INSERT EMERGENCY AIRWAY: CPT

## 2021-06-20 PROCEDURE — 700102 HCHG RX REV CODE 250 W/ 637 OVERRIDE(OP): Performed by: EMERGENCY MEDICINE

## 2021-06-20 PROCEDURE — 700102 HCHG RX REV CODE 250 W/ 637 OVERRIDE(OP): Performed by: STUDENT IN AN ORGANIZED HEALTH CARE EDUCATION/TRAINING PROGRAM

## 2021-06-20 PROCEDURE — 700105 HCHG RX REV CODE 258: Performed by: INTERNAL MEDICINE

## 2021-06-20 PROCEDURE — 94003 VENT MGMT INPAT SUBQ DAY: CPT

## 2021-06-20 PROCEDURE — 36556 INSERT NON-TUNNEL CV CATH: CPT | Mod: RT | Performed by: INTERNAL MEDICINE

## 2021-06-20 PROCEDURE — 94799 UNLISTED PULMONARY SVC/PX: CPT

## 2021-06-20 PROCEDURE — 93010 ELECTROCARDIOGRAM REPORT: CPT | Performed by: INTERNAL MEDICINE

## 2021-06-20 PROCEDURE — 99292 CRITICAL CARE ADDL 30 MIN: CPT | Mod: 25 | Performed by: INTERNAL MEDICINE

## 2021-06-20 PROCEDURE — 770022 HCHG ROOM/CARE - ICU (200)

## 2021-06-20 PROCEDURE — 700111 HCHG RX REV CODE 636 W/ 250 OVERRIDE (IP): Performed by: RADIOLOGY

## 2021-06-20 PROCEDURE — 80048 BASIC METABOLIC PNL TOTAL CA: CPT

## 2021-06-20 PROCEDURE — 87086 URINE CULTURE/COLONY COUNT: CPT

## 2021-06-20 PROCEDURE — 85610 PROTHROMBIN TIME: CPT

## 2021-06-20 PROCEDURE — U0005 INFEC AGEN DETEC AMPLI PROBE: HCPCS

## 2021-06-20 PROCEDURE — 70496 CT ANGIOGRAPHY HEAD: CPT | Mod: MG

## 2021-06-20 PROCEDURE — 86900 BLOOD TYPING SEROLOGIC ABO: CPT

## 2021-06-20 PROCEDURE — G0425 INPT/ED TELECONSULT30: HCPCS | Mod: G0 | Performed by: PSYCHIATRY & NEUROLOGY

## 2021-06-20 PROCEDURE — 93005 ELECTROCARDIOGRAM TRACING: CPT

## 2021-06-20 PROCEDURE — 86850 RBC ANTIBODY SCREEN: CPT

## 2021-06-20 PROCEDURE — 84443 ASSAY THYROID STIM HORMONE: CPT

## 2021-06-20 PROCEDURE — 700117 HCHG RX CONTRAST REV CODE 255: Performed by: STUDENT IN AN ORGANIZED HEALTH CARE EDUCATION/TRAINING PROGRAM

## 2021-06-20 PROCEDURE — 700111 HCHG RX REV CODE 636 W/ 250 OVERRIDE (IP): Performed by: EMERGENCY MEDICINE

## 2021-06-20 PROCEDURE — 94002 VENT MGMT INPAT INIT DAY: CPT

## 2021-06-20 PROCEDURE — 0BH17EZ INSERTION OF ENDOTRACHEAL AIRWAY INTO TRACHEA, VIA NATURAL OR ARTIFICIAL OPENING: ICD-10-PCS | Performed by: INTERNAL MEDICINE

## 2021-06-20 PROCEDURE — 70498 CT ANGIOGRAPHY NECK: CPT | Mod: MG

## 2021-06-20 PROCEDURE — 36556 INSERT NON-TUNNEL CV CATH: CPT

## 2021-06-20 PROCEDURE — 83036 HEMOGLOBIN GLYCOSYLATED A1C: CPT

## 2021-06-20 PROCEDURE — 92950 HEART/LUNG RESUSCITATION CPR: CPT

## 2021-06-20 PROCEDURE — 70450 CT HEAD/BRAIN W/O DYE: CPT | Mod: ME

## 2021-06-20 PROCEDURE — 83735 ASSAY OF MAGNESIUM: CPT

## 2021-06-20 PROCEDURE — 84484 ASSAY OF TROPONIN QUANT: CPT

## 2021-06-20 PROCEDURE — A9270 NON-COVERED ITEM OR SERVICE: HCPCS | Performed by: EMERGENCY MEDICINE

## 2021-06-20 PROCEDURE — 81003 URINALYSIS AUTO W/O SCOPE: CPT

## 2021-06-20 PROCEDURE — A9270 NON-COVERED ITEM OR SERVICE: HCPCS | Performed by: INTERNAL MEDICINE

## 2021-06-20 PROCEDURE — 87426 SARSCOV CORONAVIRUS AG IA: CPT

## 2021-06-20 PROCEDURE — 36600 WITHDRAWAL OF ARTERIAL BLOOD: CPT

## 2021-06-20 PROCEDURE — 99291 CRITICAL CARE FIRST HOUR: CPT | Mod: 25 | Performed by: INTERNAL MEDICINE

## 2021-06-20 PROCEDURE — 72125 CT NECK SPINE W/O DYE: CPT | Mod: MF

## 2021-06-20 PROCEDURE — 90715 TDAP VACCINE 7 YRS/> IM: CPT | Performed by: EMERGENCY MEDICINE

## 2021-06-20 PROCEDURE — 05H333Z INSERTION OF INFUSION DEVICE INTO RIGHT INNOMINATE VEIN, PERCUTANEOUS APPROACH: ICD-10-PCS | Performed by: INTERNAL MEDICINE

## 2021-06-20 PROCEDURE — 700117 HCHG RX CONTRAST REV CODE 255

## 2021-06-20 PROCEDURE — 85730 THROMBOPLASTIN TIME PARTIAL: CPT

## 2021-06-20 PROCEDURE — B31R1ZZ FLUOROSCOPY OF INTRACRANIAL ARTERIES USING LOW OSMOLAR CONTRAST: ICD-10-PCS | Performed by: RADIOLOGY

## 2021-06-20 PROCEDURE — 0042T CT-CEREBRAL PERFUSION ANALYSIS: CPT

## 2021-06-20 RX ORDER — LISINOPRIL 2.5 MG/1
2.5 TABLET ORAL EVERY MORNING
Status: DISCONTINUED | OUTPATIENT
Start: 2021-06-21 | End: 2021-06-21

## 2021-06-20 RX ORDER — LISINOPRIL 2.5 MG/1
2.5 TABLET ORAL EVERY MORNING
Status: DISCONTINUED | OUTPATIENT
Start: 2021-06-20 | End: 2021-06-20

## 2021-06-20 RX ORDER — ASPIRIN 81 MG/1
81 TABLET, CHEWABLE ORAL DAILY
Status: DISCONTINUED | OUTPATIENT
Start: 2021-06-20 | End: 2021-06-20

## 2021-06-20 RX ORDER — BISACODYL 10 MG
10 SUPPOSITORY, RECTAL RECTAL
Status: DISCONTINUED | OUTPATIENT
Start: 2021-06-20 | End: 2021-06-20

## 2021-06-20 RX ORDER — AMOXICILLIN 250 MG
2 CAPSULE ORAL ONCE
Status: COMPLETED | OUTPATIENT
Start: 2021-06-20 | End: 2021-06-20

## 2021-06-20 RX ORDER — ASPIRIN 81 MG/1
81 TABLET, CHEWABLE ORAL DAILY
Status: DISCONTINUED | OUTPATIENT
Start: 2021-06-21 | End: 2021-06-28

## 2021-06-20 RX ORDER — POLYETHYLENE GLYCOL 3350 17 G/17G
1 POWDER, FOR SOLUTION ORAL
Status: DISCONTINUED | OUTPATIENT
Start: 2021-06-20 | End: 2021-06-28

## 2021-06-20 RX ORDER — LEVOTHYROXINE SODIUM 0.03 MG/1
25 TABLET ORAL
Status: DISCONTINUED | OUTPATIENT
Start: 2021-06-20 | End: 2021-06-20

## 2021-06-20 RX ORDER — CHOLECALCIFEROL (VITAMIN D3) 125 MCG
1000 CAPSULE ORAL ONCE
Status: COMPLETED | OUTPATIENT
Start: 2021-06-20 | End: 2021-06-20

## 2021-06-20 RX ORDER — PROPOFOL 10 MG/ML
50 INJECTION, EMULSION INTRAVENOUS ONCE
Status: COMPLETED | OUTPATIENT
Start: 2021-06-20 | End: 2021-06-20

## 2021-06-20 RX ORDER — MIDAZOLAM HYDROCHLORIDE 1 MG/ML
0.5 INJECTION INTRAMUSCULAR; INTRAVENOUS ONCE
Status: COMPLETED | OUTPATIENT
Start: 2021-06-20 | End: 2021-06-20

## 2021-06-20 RX ORDER — HYDRALAZINE HYDROCHLORIDE 20 MG/ML
INJECTION INTRAMUSCULAR; INTRAVENOUS
Status: ACTIVE
Start: 2021-06-20 | End: 2021-06-20

## 2021-06-20 RX ORDER — MIDAZOLAM HYDROCHLORIDE 1 MG/ML
INJECTION INTRAMUSCULAR; INTRAVENOUS
Status: COMPLETED
Start: 2021-06-20 | End: 2021-06-20

## 2021-06-20 RX ORDER — HYDRALAZINE HYDROCHLORIDE 20 MG/ML
INJECTION INTRAMUSCULAR; INTRAVENOUS
Status: COMPLETED
Start: 2021-06-20 | End: 2021-06-20

## 2021-06-20 RX ORDER — ENALAPRILAT 1.25 MG/ML
1.25 INJECTION INTRAVENOUS EVERY 6 HOURS PRN
Status: DISCONTINUED | OUTPATIENT
Start: 2021-06-20 | End: 2021-06-23

## 2021-06-20 RX ORDER — HYDRALAZINE HYDROCHLORIDE 20 MG/ML
10 INJECTION INTRAMUSCULAR; INTRAVENOUS ONCE
Status: COMPLETED | OUTPATIENT
Start: 2021-06-20 | End: 2021-06-20

## 2021-06-20 RX ORDER — AMOXICILLIN 250 MG
2 CAPSULE ORAL 2 TIMES DAILY
Status: DISCONTINUED | OUTPATIENT
Start: 2021-06-20 | End: 2021-06-20

## 2021-06-20 RX ORDER — ASPIRIN 300 MG/1
600 SUPPOSITORY RECTAL ONCE
Status: COMPLETED | OUTPATIENT
Start: 2021-06-20 | End: 2021-06-20

## 2021-06-20 RX ORDER — LEVOTHYROXINE SODIUM 0.03 MG/1
25 TABLET ORAL
Status: DISCONTINUED | OUTPATIENT
Start: 2021-06-21 | End: 2021-06-28

## 2021-06-20 RX ORDER — FAMOTIDINE 20 MG/1
20 TABLET, FILM COATED ORAL EVERY 12 HOURS
Status: DISCONTINUED | OUTPATIENT
Start: 2021-06-20 | End: 2021-06-25

## 2021-06-20 RX ORDER — SODIUM CHLORIDE, SODIUM LACTATE, POTASSIUM CHLORIDE, AND CALCIUM CHLORIDE .6; .31; .03; .02 G/100ML; G/100ML; G/100ML; G/100ML
2000 INJECTION, SOLUTION INTRAVENOUS ONCE
Status: COMPLETED | OUTPATIENT
Start: 2021-06-20 | End: 2021-06-20

## 2021-06-20 RX ORDER — NOREPINEPHRINE BITARTRATE 0.03 MG/ML
0-30 INJECTION, SOLUTION INTRAVENOUS CONTINUOUS
Status: DISCONTINUED | OUTPATIENT
Start: 2021-06-20 | End: 2021-06-23

## 2021-06-20 RX ORDER — ETOMIDATE 2 MG/ML
20 INJECTION INTRAVENOUS ONCE
Status: COMPLETED | OUTPATIENT
Start: 2021-06-20 | End: 2021-06-20

## 2021-06-20 RX ORDER — FENOFIBRATE 67 MG/1
67 CAPSULE ORAL EVERY EVENING
Status: DISCONTINUED | OUTPATIENT
Start: 2021-06-20 | End: 2021-06-21

## 2021-06-20 RX ORDER — AMOXICILLIN 250 MG
2 CAPSULE ORAL 2 TIMES DAILY
Status: DISCONTINUED | OUTPATIENT
Start: 2021-06-20 | End: 2021-06-28

## 2021-06-20 RX ORDER — GAUZE BANDAGE 2" X 2"
100 BANDAGE TOPICAL EVERY EVENING
Status: DISCONTINUED | OUTPATIENT
Start: 2021-06-20 | End: 2021-06-28

## 2021-06-20 RX ORDER — MAGNESIUM SULFATE HEPTAHYDRATE 40 MG/ML
2 INJECTION, SOLUTION INTRAVENOUS ONCE
Status: COMPLETED | OUTPATIENT
Start: 2021-06-20 | End: 2021-06-21

## 2021-06-20 RX ORDER — TRAZODONE HYDROCHLORIDE 150 MG/1
150 TABLET ORAL EVERY EVENING
Status: DISCONTINUED | OUTPATIENT
Start: 2021-06-20 | End: 2021-06-20

## 2021-06-20 RX ORDER — HYDRALAZINE HYDROCHLORIDE 20 MG/ML
5 INJECTION INTRAMUSCULAR; INTRAVENOUS ONCE
Status: COMPLETED | OUTPATIENT
Start: 2021-06-20 | End: 2021-06-20

## 2021-06-20 RX ORDER — BISACODYL 10 MG
10 SUPPOSITORY, RECTAL RECTAL
Status: DISCONTINUED | OUTPATIENT
Start: 2021-06-20 | End: 2021-06-28

## 2021-06-20 RX ORDER — IPRATROPIUM BROMIDE AND ALBUTEROL SULFATE 2.5; .5 MG/3ML; MG/3ML
3 SOLUTION RESPIRATORY (INHALATION)
Status: ACTIVE | OUTPATIENT
Start: 2021-06-20 | End: 2021-06-21

## 2021-06-20 RX ORDER — LORAZEPAM 2 MG/ML
2 INJECTION INTRAMUSCULAR ONCE
Status: COMPLETED | OUTPATIENT
Start: 2021-06-20 | End: 2021-06-20

## 2021-06-20 RX ORDER — LISINOPRIL 2.5 MG/1
2.5 TABLET ORAL ONCE
Status: COMPLETED | OUTPATIENT
Start: 2021-06-20 | End: 2021-06-20

## 2021-06-20 RX ORDER — OLANZAPINE 5 MG/1
10 TABLET ORAL
Status: DISCONTINUED | OUTPATIENT
Start: 2021-06-20 | End: 2021-06-20

## 2021-06-20 RX ORDER — LORAZEPAM 2 MG/ML
1-2 INJECTION INTRAMUSCULAR
Status: DISCONTINUED | OUTPATIENT
Start: 2021-06-20 | End: 2021-06-25

## 2021-06-20 RX ORDER — ASPIRIN 81 MG/1
81 TABLET, CHEWABLE ORAL DAILY
Status: DISCONTINUED | OUTPATIENT
Start: 2021-06-21 | End: 2021-06-20

## 2021-06-20 RX ORDER — LEVOTHYROXINE SODIUM 0.03 MG/1
25 TABLET ORAL ONCE
Status: COMPLETED | OUTPATIENT
Start: 2021-06-20 | End: 2021-06-20

## 2021-06-20 RX ORDER — FOLIC ACID 1 MG/1
1 TABLET ORAL EVERY MORNING
Status: DISCONTINUED | OUTPATIENT
Start: 2021-06-20 | End: 2021-06-20

## 2021-06-20 RX ORDER — SODIUM BICARBONATE IN D5W 150/1000ML
PLASTIC BAG, INJECTION (ML) INTRAVENOUS CONTINUOUS
Status: DISCONTINUED | OUTPATIENT
Start: 2021-06-20 | End: 2021-06-21

## 2021-06-20 RX ORDER — LABETALOL HYDROCHLORIDE 5 MG/ML
10 INJECTION, SOLUTION INTRAVENOUS
Status: DISCONTINUED | OUTPATIENT
Start: 2021-06-20 | End: 2021-06-23

## 2021-06-20 RX ORDER — POLYETHYLENE GLYCOL 3350 17 G/17G
1 POWDER, FOR SOLUTION ORAL
Status: DISCONTINUED | OUTPATIENT
Start: 2021-06-20 | End: 2021-06-20

## 2021-06-20 RX ORDER — CHOLECALCIFEROL (VITAMIN D3) 125 MCG
1000 CAPSULE ORAL DAILY
Refills: 3 | Status: DISCONTINUED | OUTPATIENT
Start: 2021-06-20 | End: 2021-06-20

## 2021-06-20 RX ORDER — FENOFIBRATE 48 MG/1
48 TABLET, COATED ORAL EVERY EVENING
Status: ON HOLD | COMMUNITY
End: 2021-06-30

## 2021-06-20 RX ORDER — GAUZE BANDAGE 2" X 2"
100 BANDAGE TOPICAL EVERY EVENING
Status: DISCONTINUED | OUTPATIENT
Start: 2021-06-20 | End: 2021-06-20

## 2021-06-20 RX ORDER — SUCCINYLCHOLINE CHLORIDE 20 MG/ML
100 INJECTION INTRAMUSCULAR; INTRAVENOUS ONCE
Status: COMPLETED | OUTPATIENT
Start: 2021-06-20 | End: 2021-06-20

## 2021-06-20 RX ORDER — HYDRALAZINE HYDROCHLORIDE 20 MG/ML
10-20 INJECTION INTRAMUSCULAR; INTRAVENOUS EVERY 4 HOURS PRN
Status: DISCONTINUED | OUTPATIENT
Start: 2021-06-20 | End: 2021-06-28

## 2021-06-20 RX ORDER — DEXTROSE MONOHYDRATE 25 G/50ML
50 INJECTION, SOLUTION INTRAVENOUS
Status: DISCONTINUED | OUTPATIENT
Start: 2021-06-20 | End: 2021-06-28

## 2021-06-20 RX ORDER — LEVOTHYROXINE SODIUM 0.03 MG/1
25 TABLET ORAL
COMMUNITY

## 2021-06-20 RX ORDER — CHOLECALCIFEROL (VITAMIN D3) 125 MCG
1000 CAPSULE ORAL DAILY
Status: DISCONTINUED | OUTPATIENT
Start: 2021-06-21 | End: 2021-06-28

## 2021-06-20 RX ORDER — ATORVASTATIN CALCIUM 40 MG/1
80 TABLET, FILM COATED ORAL EVERY EVENING
Status: DISCONTINUED | OUTPATIENT
Start: 2021-06-20 | End: 2021-06-20

## 2021-06-20 RX ORDER — FOLIC ACID 1 MG/1
1 TABLET ORAL EVERY MORNING
Status: DISCONTINUED | OUTPATIENT
Start: 2021-06-21 | End: 2021-06-29

## 2021-06-20 RX ORDER — ACETAMINOPHEN 325 MG/1
650 TABLET ORAL EVERY 4 HOURS PRN
Status: DISCONTINUED | OUTPATIENT
Start: 2021-06-20 | End: 2021-06-23

## 2021-06-20 RX ORDER — ATORVASTATIN CALCIUM 80 MG/1
80 TABLET, FILM COATED ORAL EVERY EVENING
Status: DISCONTINUED | OUTPATIENT
Start: 2021-06-20 | End: 2021-06-28

## 2021-06-20 RX ORDER — DEXMEDETOMIDINE HYDROCHLORIDE 4 UG/ML
.1-1.5 INJECTION, SOLUTION INTRAVENOUS CONTINUOUS
Status: DISCONTINUED | OUTPATIENT
Start: 2021-06-20 | End: 2021-06-20

## 2021-06-20 RX ORDER — FENOFIBRATE 67 MG/1
67 CAPSULE ORAL EVERY EVENING
Status: DISCONTINUED | OUTPATIENT
Start: 2021-06-20 | End: 2021-06-20

## 2021-06-20 RX ADMIN — SODIUM BICARBONATE: 84 INJECTION, SOLUTION INTRAVENOUS at 19:25

## 2021-06-20 RX ADMIN — SODIUM CHLORIDE 10 MG/HR: 9 INJECTION, SOLUTION INTRAVENOUS at 12:32

## 2021-06-20 RX ADMIN — SODIUM CHLORIDE 3 G: 900 INJECTION INTRAVENOUS at 23:30

## 2021-06-20 RX ADMIN — SODIUM CHLORIDE 5 MG/HR: 9 INJECTION, SOLUTION INTRAVENOUS at 09:32

## 2021-06-20 RX ADMIN — FENOFIBRATE 67 MG: 67 CAPSULE ORAL at 20:43

## 2021-06-20 RX ADMIN — INSULIN HUMAN 1 UNITS: 100 INJECTION, SOLUTION PARENTERAL at 17:16

## 2021-06-20 RX ADMIN — LABETALOL HYDROCHLORIDE 10 MG: 5 INJECTION, SOLUTION INTRAVENOUS at 10:33

## 2021-06-20 RX ADMIN — SODIUM BICARBONATE 50 MEQ: 84 INJECTION, SOLUTION INTRAVENOUS at 17:51

## 2021-06-20 RX ADMIN — ATORVASTATIN CALCIUM 80 MG: 80 TABLET, FILM COATED ORAL at 17:07

## 2021-06-20 RX ADMIN — INSULIN HUMAN 1 UNITS: 100 INJECTION, SOLUTION PARENTERAL at 23:30

## 2021-06-20 RX ADMIN — MIDAZOLAM HYDROCHLORIDE 0.5 MG: 1 INJECTION, SOLUTION INTRAMUSCULAR; INTRAVENOUS at 06:25

## 2021-06-20 RX ADMIN — SODIUM CHLORIDE 3 G: 900 INJECTION INTRAVENOUS at 18:37

## 2021-06-20 RX ADMIN — ACETAMINOPHEN 650 MG: 325 TABLET, FILM COATED ORAL at 16:40

## 2021-06-20 RX ADMIN — SODIUM CHLORIDE 7.5 MG/HR: 9 INJECTION, SOLUTION INTRAVENOUS at 15:29

## 2021-06-20 RX ADMIN — LABETALOL HYDROCHLORIDE 10 MG: 5 INJECTION, SOLUTION INTRAVENOUS at 08:04

## 2021-06-20 RX ADMIN — ETOMIDATE 20 MG: 2 INJECTION INTRAVENOUS at 15:42

## 2021-06-20 RX ADMIN — SUCCINYLCHOLINE CHLORIDE 100 MG: 20 INJECTION, SOLUTION INTRAMUSCULAR; INTRAVENOUS; PARENTERAL at 15:42

## 2021-06-20 RX ADMIN — IOHEXOL 60 ML: 350 INJECTION, SOLUTION INTRAVENOUS at 04:26

## 2021-06-20 RX ADMIN — CYANOCOBALAMIN TAB 500 MCG 1000 MCG: 500 TAB at 14:55

## 2021-06-20 RX ADMIN — LEVOTHYROXINE SODIUM 25 MCG: 0.03 TABLET ORAL at 15:26

## 2021-06-20 RX ADMIN — LORAZEPAM 2 MG: 2 INJECTION INTRAMUSCULAR; INTRAVENOUS at 08:06

## 2021-06-20 RX ADMIN — Medication 25 MCG/HR: at 16:46

## 2021-06-20 RX ADMIN — SODIUM CHLORIDE, POTASSIUM CHLORIDE, SODIUM LACTATE AND CALCIUM CHLORIDE 2000 ML: 600; 310; 30; 20 INJECTION, SOLUTION INTRAVENOUS at 17:20

## 2021-06-20 RX ADMIN — DEXMEDETOMIDINE 0.2 MCG/KG/HR: 200 INJECTION, SOLUTION INTRAVENOUS at 15:35

## 2021-06-20 RX ADMIN — MAGNESIUM SULFATE 2 G: 2 INJECTION INTRAVENOUS at 22:23

## 2021-06-20 RX ADMIN — FAMOTIDINE 20 MG: 20 TABLET ORAL at 17:07

## 2021-06-20 RX ADMIN — DOCUSATE SODIUM 50 MG AND SENNOSIDES 8.6 MG 2 TABLET: 8.6; 5 TABLET, FILM COATED ORAL at 14:55

## 2021-06-20 RX ADMIN — CLOSTRIDIUM TETANI TOXOID ANTIGEN (FORMALDEHYDE INACTIVATED), CORYNEBACTERIUM DIPHTHERIAE TOXOID ANTIGEN (FORMALDEHYDE INACTIVATED), BORDETELLA PERTUSSIS TOXOID ANTIGEN (GLUTARALDEHYDE INACTIVATED), BORDETELLA PERTUSSIS FILAMENTOUS HEMAGGLUTININ ANTIGEN (FORMALDEHYDE INACTIVATED), BORDETELLA PERTUSSIS PERTACTIN ANTIGEN, AND BORDETELLA PERTUSSIS FIMBRIAE 2/3 ANTIGEN 0.5 ML: 5; 2; 2.5; 5; 3; 5 INJECTION, SUSPENSION INTRAMUSCULAR at 09:39

## 2021-06-20 RX ADMIN — HYDRALAZINE HYDROCHLORIDE 10 MG: 20 INJECTION INTRAMUSCULAR; INTRAVENOUS at 07:47

## 2021-06-20 RX ADMIN — THIAMINE HCL TAB 100 MG 100 MG: 100 TAB at 20:43

## 2021-06-20 RX ADMIN — HYDRALAZINE HYDROCHLORIDE 5 MG: 20 INJECTION INTRAMUSCULAR; INTRAVENOUS at 07:06

## 2021-06-20 RX ADMIN — ASPIRIN 600 MG: 300 SUPPOSITORY RECTAL at 08:55

## 2021-06-20 RX ADMIN — FENTANYL CITRATE 25 MCG: 50 INJECTION, SOLUTION INTRAMUSCULAR; INTRAVENOUS at 06:25

## 2021-06-20 RX ADMIN — IOHEXOL 40 ML: 350 INJECTION, SOLUTION INTRAVENOUS at 04:19

## 2021-06-20 RX ADMIN — NOREPINEPHRINE BITARTRATE 5 MCG/MIN: 1 INJECTION INTRAVENOUS at 17:47

## 2021-06-20 RX ADMIN — FENTANYL CITRATE 50 MCG: 50 INJECTION, SOLUTION INTRAMUSCULAR; INTRAVENOUS at 05:45

## 2021-06-20 RX ADMIN — PROPOFOL 5 MCG/KG/MIN: 10 INJECTION, EMULSION INTRAVENOUS at 23:39

## 2021-06-20 RX ADMIN — ACETAMINOPHEN 650 MG: 325 TABLET, FILM COATED ORAL at 20:43

## 2021-06-20 RX ADMIN — LISINOPRIL 2.5 MG: 2.5 TABLET ORAL at 14:55

## 2021-06-20 RX ADMIN — MIDAZOLAM HYDROCHLORIDE 0.5 MG: 1 INJECTION INTRAMUSCULAR; INTRAVENOUS at 06:25

## 2021-06-20 RX ADMIN — IOHEXOL 50 ML: 300 INJECTION, SOLUTION INTRAVENOUS at 07:15

## 2021-06-20 RX ADMIN — PROPOFOL 50 MG: 10 INJECTION, EMULSION INTRAVENOUS at 16:08

## 2021-06-20 RX ADMIN — DOCUSATE SODIUM 50 MG AND SENNOSIDES 8.6 MG 2 TABLET: 8.6; 5 TABLET, FILM COATED ORAL at 17:07

## 2021-06-20 ASSESSMENT — COGNITIVE AND FUNCTIONAL STATUS - GENERAL
SUGGESTED CMS G CODE MODIFIER MOBILITY: CM
SUGGESTED CMS G CODE MODIFIER DAILY ACTIVITY: CN
MOVING FROM LYING ON BACK TO SITTING ON SIDE OF FLAT BED: UNABLE
EATING MEALS: TOTAL
MOVING TO AND FROM BED TO CHAIR: UNABLE
PERSONAL GROOMING: TOTAL
TOILETING: TOTAL
DRESSING REGULAR LOWER BODY CLOTHING: TOTAL
DAILY ACTIVITIY SCORE: 6
CLIMB 3 TO 5 STEPS WITH RAILING: TOTAL
DRESSING REGULAR UPPER BODY CLOTHING: TOTAL
MOBILITY SCORE: 7
STANDING UP FROM CHAIR USING ARMS: TOTAL
TURNING FROM BACK TO SIDE WHILE IN FLAT BAD: A LOT
WALKING IN HOSPITAL ROOM: TOTAL
HELP NEEDED FOR BATHING: TOTAL

## 2021-06-20 ASSESSMENT — LIFESTYLE VARIABLES
ALCOHOL_USE: NO
DOES PATIENT WANT TO STOP DRINKING: NO
EVER FELT BAD OR GUILTY ABOUT YOUR DRINKING: NO
HAVE YOU EVER FELT YOU SHOULD CUT DOWN ON YOUR DRINKING: NO
HOW MANY TIMES IN THE PAST YEAR HAVE YOU HAD 5 OR MORE DRINKS IN A DAY: 0
TOTAL SCORE: 0
TOTAL SCORE: 0
EVER HAD A DRINK FIRST THING IN THE MORNING TO STEADY YOUR NERVES TO GET RID OF A HANGOVER: NO
ON A TYPICAL DAY WHEN YOU DRINK ALCOHOL HOW MANY DRINKS DO YOU HAVE: 0
CONSUMPTION TOTAL: NEGATIVE
HAVE PEOPLE ANNOYED YOU BY CRITICIZING YOUR DRINKING: NO
AVERAGE NUMBER OF DAYS PER WEEK YOU HAVE A DRINK CONTAINING ALCOHOL: 0
TOTAL SCORE: 0

## 2021-06-20 ASSESSMENT — PATIENT HEALTH QUESTIONNAIRE - PHQ9
1. LITTLE INTEREST OR PLEASURE IN DOING THINGS: NOT AT ALL
SUM OF ALL RESPONSES TO PHQ9 QUESTIONS 1 AND 2: 0
2. FEELING DOWN, DEPRESSED, IRRITABLE, OR HOPELESS: NOT AT ALL

## 2021-06-20 ASSESSMENT — PAIN DESCRIPTION - PAIN TYPE
TYPE: ACUTE PAIN

## 2021-06-20 ASSESSMENT — FIBROSIS 4 INDEX: FIB4 SCORE: 2.06

## 2021-06-20 NOTE — ED NOTES
Worker at [facility] who reported [facility]. Spoke with [facility] pharmacist who reported they do not have a record for this patient. Will recall group home an clarify information given

## 2021-06-20 NOTE — PROGRESS NOTES
Cererbral carotid angiogram with thrombectomy by MD Smith assisted by RT Nadege and Williams, right femoral access site; Pneumbra system was used to retreive clot.   Right groin sealed with Angioseal 6 Fr. at time 0651  REF# [id number]   LOT#[id number] Exp. 3/31/22     In IR Room 0535  First Puncture 0541  First Angiogram 0555  First Pass 0612  TICI 2C 0648  Angioseal 0652  Procedure End 0654      groin site assessment- Soft, dry, dressing applied  Patient tolerated procedure, report given to ICU RN Madelyn;  Bedside NIH completed with 2 RN     patient transported  to room via Tamela LEDEZMA monitored        No

## 2021-06-20 NOTE — THERAPY
"Speech Language Pathology   Initial Assessment     Patient Name: Morgan Davis  AGE:  70 y.o., SEX:  male  Medical Record #: [id number]  Today's Date: 6/20/2021     Precautions: Swallow Precautions ( See Comments)  Comments: Right side deficits: high aspiration risk: L wrist restraint    Assessment    Mr. Davis is a 70-year old male who presented to the hospital from his group home after falling out of his bed.  He was subsequently found to have right sided weakness and severely dysarthric speech.   CTA of head:  \"Thrombus in the distal M1 segment of the left middle cerebral artery. Reconstitution of the M2 middle cerebral artery branches.\"  He is s/p cerebral angiogram and left MCA mechanical thrombectomy this morning.  PMH: schizophrenia, hypertension, hypothyroidism, COPD, non-insulin dependent diabetes on Aspirin     Patient awake, alert and lying in bed.  He was noted to have audible upper airway congestion with wet cough. Speech was noted to be severely dysarthric with significantly impaired intelligibility.  He was oriented to person and place, and initially thought it was March 2001.  he was reoriented and later, he was able to recall month and year.  He was noted to have right side facial droop and poor sensation on the right.  He was very inconsistent with following oral motor commands, but unsure if this is related to a language issue with inability to consistently follow commands or some level of oral apraxia.  He was perseverating on needing scissors to cut his right wrist restraint.  Presentation of PO consisted of single ice chips x3 and three 1/5 teaspoon boluses of mildly thick liquids. Patient with poor oral awareness and talking during oral phase.  With max cues to close mouth and swallow, he was able to comply, but this was followed by wet coughing.  Deep oral suctioning was provided following coughing episodes which occurred on all trials.  Was able to clear a significant amount of thick " "secretions each time resulting in fairly clear vocal quality and a reduction of upper airway congestion.      In summary, patient is presenting with severe dysarthria, severe oral dysphagia and significant S/Sx of pharyngeal dysphagia and is not safe for progression to PO diet at all.  Would recommend NPO with consideration of non oral nutrition/medication delivery. Spoke with Dr. Fabian who was in agreement and will order cortrak.  Aggressive oral care is also needed to minimize xerostomia.  SLP will follow for dysphagia therapy and speech/language evaluation as patient is able.  Thank you for the consult.     Plan    NPO with consideration of non oral nutrition and medication delivery.     Aggressive oral care.    Recommend Speech Therapy 5 times per week until therapy goals are met for the following treatments:  Dysphagia Training, Expression Training and AAC Training / Development.    Discharge Recommendations: Recommend post-acute placement for additional speech therapy services prior to discharge home     Objective     06/20/21 1323   Vitals   O2 (LPM) 0   O2 Delivery Device None - Room Air   Pain 0 - 10 Group   Therapist Pain Assessment Nurse Notified;Post Activity Pain Same as Prior to Activity  (no c/o pain)   Prior Living Situation   Prior Services Unable To Determine At This Time   Housing / Facility Group Home   Lives with - Patient's Self Care Capacity Unrelated Adult   Comments Pt came from group home.  Baseline is unknown--he did have dyspghagia in 2016 and was on pureed diet   Prior Level Of Function   Communication Unknown   Swallow Impaired  (history of dysphagia)   Dentition Edentulous   Dentures   (unknown)   Hearing Within Functional Limits for Evaluation   Hearing Aid None   Vision Wears Corrective Lenses  (\"sometimes to see\")   Patient's Primary Language English   Education   (unable to assess)   Occupation (Pre-Hospital Vocational) Retired Due To Age  (unable to understand what patient was " saying)   Oral Motor Eval    Is Patient Able to Complete Oral Motor Eval Yes but Impaired   Labial Function   Labial Structure At Rest Moderate  (right side weakness)   Labial Vowel Production / I /, / U / Moderate   Labial Sequence / I /, / U /   (not able to follow)   Frown, Pucker   (not able to follow)   Lingual Function   Lingual Structure At Rest Moderate  (deviates to LEFT)   Lingual Protrude Severe  (?  oral apraxia-)   Lingual Retract   (not able to follow)   Elevate Outside Mouth   (states he was doing it, but ? apraxia vs command following)   Lateralization Severe Right;Severe Left  (only observed intra-orally, not able to protrude tongue)   Lick Lips (Circular)   (not able to follow)   Lick Palate Not Tested   / Pa / 5X's Moderate   / Ta / 5X's Severe   / Ka / 5X's Severe   Jaw   Jaw Structure At Rest Within Functional Limits   Bite ([place]) Moderate   Chew (Rotary) Not Tested   Jaw Open / Resist Moderate  (open mouth posture, very weak)   Jaw Close / Resist Moderate   Velar Function   Velar Structure At Rest Moderate   Gag / Palatal Reflex Severe   Laryngeal Function   Voice Quality Severe  (wet/gurgly, audible congestion)   Volutional Cough   (unable to follow for cued cough: reflexive cough mod)   Excursion Upon Swallow Weak    Max Phonation Time (Seconds) not able to follow   Oral Food Presentation   Ice Chips Severe  (delayed swallow, coughing before and after swallow)   Single Swallow Mildly Thick (2) - (Nectar Thick)  Severe  (two 1/5 teaspoon boluses: delayed swallow, sig cough)   Serial Swallow Mildly Thick (2) - (Nectar Thick) Not Tested   Single Swallow Thin (0) Not Tested   Serial Swallow Thin (0) Not Tested   Liquidised (3) Not Tested   Pureed (4) Not Tested   Minced & Moist (5) - (Dysphagia II) Not Tested   Soft & Bite-Sized (6) - (Dysphagia III) Not Tested   Regular (7) Not Tested   Regular-Easy to Chew (7) Not Tested   Self Feeding Not Tested   Tracheostomy   Tracheostomy  No    Dysphagia Strategies / Recommendations   Strategies / Interventions Recommended (Yes / No) Yes   Compensatory Strategies To Be Assessed   Diet / Liquid Recommendation NPO;Pre-Feeding Trials with SLP Only  (consider non oral nutrition)   Medication Administration  Via Gastric Tube  (or alternative route)   Therapy Interventions Dysphagia Therapy By Speech Language Pathologist;Oral Motor Exercises;Pharyngeal / Laryngeal Exercises   Follow Up SLP Evaluation needs S/L eval and will most likely need diagnostic evaluation   Dysphagia Rating   Nutritional Liquid Intake Rating Scale Nothing by mouth   Nutritional Food Intake Rating Scale Nothing by mouth   Short Term Goals   Short Term Goal # 1 Pt will be able to consume prefeeding trials with SLP only with no overt S/Sx of aspiration noted   Short Term Goal # 2 Pt will be able to follow single step oral motor commands for lingual and labial and pharyngeal strengthening with 60% accuracy when given moderate cues.    Anticipated Discharge Needs   Discharge Recommendations Recommend post-acute placement for additional speech therapy services prior to discharge home   Therapy Recommendations Upon DC Dysphagia Training;Expression Training;Community Re-Integration;Patient / Family / Caregiver Education   Interdisciplinary Plan of Care Collaboration   IDT Collaboration with  Nursing;Physician   Patient Position at End of Therapy In Bed;Wrist Restraints Applied;Call Light within Reach;Tray Table within Reach;Phone within Reach   Collaboration Comments Discussed with RN and MD--cortrak being ordered

## 2021-06-20 NOTE — ED NOTES
PT initially refusing intervention.   After conversation with neurologist PT is agreeing to IR.     PT prepped for IR by ED tech.    PT resting comfortably

## 2021-06-20 NOTE — ED NOTES
Med Rec completed: per worker at [facility] via phone at [phone]  Preferred [facility]  Allergies:  No Known Allergies    No ORAL antibiotics in last 14 days    Home Medications:  Medication Sig Comments   • fenofibrate (TRICOR) 48 MG Tab Take 48 mg by mouth every evening.    • metFORMIN (GLUCOPHAGE) 500 MG Tab Take 500 mg by mouth 2 times a day.    • Umeclidinium Bromide (INCRUSE ELLIPTA) 62.5 MCG/INH AEROSOL POWDER, BREATH ACTIVATED Inhale 1 Puff every day.    • levothyroxine (SYNTHROID) 25 MCG Tab Take 25 mcg by mouth every morning on an empty stomach.    • folic acid (FOLVITE) 1 MG Tab Take 1 mg by mouth every morning.    • tiotropium (SPIRIVA HANDIHALER) 18 MCG Cap Inhale 18 mcg by mouth every day.    • trazodone (DESYREL) 150 MG Tab Take 1 Tab by mouth every evening.    • cyanocobalamin (VITAMIN B12) 1000 MCG Tab Take 1 Tab by mouth every day.    • thiamine (THIAMINE) 100 MG Tab Take 100 mg by mouth every evening.    • lisinopril (PRINIVIL) 2.5 MG Tab Take 2.5 mg by mouth every morning.    • olanzapine (ZYPREXA) 10 MG tablet Take 10 mg by mouth at bedtime.      **Group home could not fax a MAR as woman reported it does not work at this time. Information was given verbally over phone.

## 2021-06-20 NOTE — PROGRESS NOTES
71 y/o male w/ hx of schizophrenia living in a group home presents with right hemiplegia. Patient reportedly got out of bed and had a fall with subsequent right sided weakness. CT Head shows a favorable ASPECT score (although somewhat degraded by motion). CTA shows a left M1 occlusion, and CTP shows a 60 ml core with a large mismatch NIHSS of 13 obtained by ER.  Discussed with Dr. Monroy, neurohospitalist and we are in agreement that Thrombectomy is reasonable to proceed with.

## 2021-06-20 NOTE — ED PROVIDER NOTES
"ED Provider Note    CHIEF COMPLAINT  Fall, right-sided weakness    HPI  Morgan Davis is a 70 y.o. male who presents to the emergency department after falling out of bed this morning.  He lives at a group home.  He fell out of bed shortly before arrival.  He could not move his right side.  Unknown last well time.  It was sometime yesterday.  Paramedics were called.  Patient was found to have right-sided weakness.  He was confused with slurred speech.  Stroke protocol was activated.  He had evidence of head injury per paramedics.  Patient does take aspirin daily.    REVIEW OF SYSTEMS  Pertinent positives: Cough  Pertinent negatives: Denies fevers, chest pain, shortness of breath, headache.      All other systems reviewed and are negative.     PAST MEDICAL HISTORY  Past Medical History:   Diagnosis Date   • Hyperlipidemia    • Hypertension    • Schizophrenia (HCC)    • Thyroid disease        FAMILY HISTORY  No family history on file.    SOCIAL HISTORY  Social History     Tobacco Use   • Smoking status: Current Every Day Smoker     Packs/day: 1.00     Years: 54.00     Pack years: 54.00     Types: Cigarettes   • Smokeless tobacco: Never Used   • Tobacco comment: since age 15   Substance Use Topics   • Alcohol use: No   • Drug use: No       SURGICAL HISTORY  No past surgical history on file.    CURRENT MEDICATIONS  Home Medications    **Home medications have not yet been reviewed for this encounter**         ALLERGIES  Allergies   Allergen Reactions   • Nkda [No Known Drug Allergy]        PHYSICAL EXAM  VITAL SIGNS: /86   Pulse 75   Resp 20   Ht 1.778 m (5' 10\")   Wt 74.8 kg (165 lb)   BMI 23.68 kg/m²   Constitutional: Awake and alert.  Oriented   HENT: Contusion and abrasion over the right face, ears normal inspection, oropharynx is benign with moist mucous membranes, nares clear  Eyes: Pupils equal round reactive, extraocular muscles are intact. No ptosis or miosis.  Neck: Neck is supple without meningismus. " No JVD. No pain.   Cardiovascular: Normal heart rate, Normal rhythm  Thorax & Lungs: Rhonchorous breath sounds.  Limited gag.  No respiratory distress.  Abdomen: Soft, No tenderness, No masses, No pulsatile mass   Skin: No pathologic rash  Extremities: No asymmetric swelling  Neurologic:      NIHSS    1a. Level of Consciousness  0. Alert. Keenly Responsive  1. Not Alert but arousable by minor stimulation  2. Not Alert. Requires repeated stimulation  3. Responds only w reflex motor/autonomic effects or totally unresponsive  Score:0  1b. Level of Consciousness: ask month and age  0. Answers both questions correctly  1. Answers one question correctly  2. Answers neither question correctly  Score: 0  1c. Level of Consciousness: ask to open and close eyes/make a fist with non-paretic hand  0. Performs both tasks correctly  1. Performs one task correctly  2. Performs neither task correctly  Score: 0  2. Best gaze: Horizontal eye movement             0 Normal             1 Partial gaze palsy. Forced deviation or total palsy not present             2 Forced deviation. Not overcome by oculo-cephalic maneuver            Score: 1  3. Visual Fields             0 No loss             1 Partial hemianopia             2 Complete hemianopia             3 Bilateral hemianopia. Blindness             Score:0  4. Facial Palsy           0 Normal symmetrical movements           1 Minor paralysis           2 Partial paralysis           3 Complete paralysis           Score: 3  5. Motor Arm Right and Left           0 No drift           1 Drift but does not hit bed           2 Some effort against gravity. Hits bed           3 No effort against gravity           4 No movement          UN Amputation/joint fusion/explain          Score R: 3          Score L: 0  6. Motor Leg: Right and Left          0 No drift          1 Drift but does not hit bed          2 Some effort against gravity. Hits bed          3 No effort against gravity          4 No  movement          UN Amputation/joint fusion/explain          Score R :3          Score L :0  7. Limb Ataxia. Finger to nose/ shin-heel         0 Absent         1 Present in one limb         2 Present in two limbs         UN Amputation/joint fusion explain         Score:0  8. Sensory. Pinprick         0 Normal. No sensory loss         1 Mild to moderate sensory loss         2 Severe or total sensory loss         Score: 1  9. Best language. Show the kitchen/cookie picture         0 No aphasia         1 Mild to moderate aphasia         2 Severe aphasia         3 Mute. Global aphasia         Score:0  10. Dysarthria. Show the reading list         0 Normal         1 Mild to moderate dysarthria         2 Severe dysarthria         3 Intubated or other physical barrier         Score: 1  11. Extinction and Inattention         0 No abnormality         1 Extinction to simultaneous stimulation         2 Profound Tommy-inattention or extinction         Score: 1        Total score: 13    RADIOLOGY/PROCEDURES  DX-CHEST-PORTABLE (1 VIEW)   Final Result      No acute cardiopulmonary findings.      CT-CTA HEAD WITH & W/O-POST PROCESS   Final Result      Thrombus in the distal M1 segment of the left middle cerebral artery. Reconstitution of the M2 middle cerebral artery branches.               Comment: Results discussed with Dr. Ren at approximately 4:35 AM      CT-CTA NECK WITH & W/O-POST PROCESSING   Final Result      Atherosclerotic disease. Resulting stenosis is less than 50%      CT-CSPINE WITHOUT PLUS RECONS   Final Result      No acute fracture is identified.      CT-CEREBRAL PERFUSION ANALYSIS   Final Result      1.  Cerebral blood flow less than 30% likely representing completed infarct = 60 mL.      2.  T Max more than 6 seconds likely representing combination of completed infarct and ischemia = 231 mL.      3.  Mismatched volume likely representing ischemic brain/penumbra = 171 mL      4.  Please note that the cerebral  perfusion was performed on the limited brain tissue around the basal ganglia region. Infarct/ischemia outside the CT perfusion sections can be missed in this study.      CT-HEAD W/O   Final Result      1.  No acute intracranial findings.      2.  Diffuse atrophy and periventricular white matter changes, consistent with chronic small vessel.               IR-THROMBO MECHANICAL ARTERY,INIT    (Results Pending)        Imaging is interpreted by radiologist    LABS:  Results for orders placed or performed during the hospital encounter of 06/20/21   CBC WITH DIFFERENTIAL   Result Value Ref Range    WBC 7.6 4.8 - 10.8 K/uL    RBC 5.18 4.70 - 6.10 M/uL    Hemoglobin 16.5 14.0 - 18.0 g/dL    Hematocrit 49.5 42.0 - 52.0 %    MCV 95.6 81.4 - 97.8 fL    MCH 31.9 27.0 - 33.0 pg    MCHC 33.3 (L) 33.7 - 35.3 g/dL    RDW 48.2 35.9 - 50.0 fL    Platelet Count 159 (L) 164 - 446 K/uL    MPV 10.0 9.0 - 12.9 fL    Neutrophils-Polys 45.40 44.00 - 72.00 %    Lymphocytes 40.70 22.00 - 41.00 %    Monocytes 8.70 0.00 - 13.40 %    Eosinophils 4.60 0.00 - 6.90 %    Basophils 0.10 0.00 - 1.80 %    Immature Granulocytes 0.50 0.00 - 0.90 %    Nucleated RBC 0.00 /100 WBC    Neutrophils (Absolute) 3.42 1.82 - 7.42 K/uL    Lymphs (Absolute) 3.07 1.00 - 4.80 K/uL    Monos (Absolute) 0.66 0.00 - 0.85 K/uL    Eos (Absolute) 0.35 0.00 - 0.51 K/uL    Baso (Absolute) 0.01 0.00 - 0.12 K/uL    Immature Granulocytes (abs) 0.04 0.00 - 0.11 K/uL    NRBC (Absolute) 0.00 K/uL   COMP METABOLIC PANEL   Result Value Ref Range    Sodium 137 135 - 145 mmol/L    Potassium 3.8 3.6 - 5.5 mmol/L    Chloride 104 96 - 112 mmol/L    Co2 23 20 - 33 mmol/L    Anion Gap 10.0 7.0 - 16.0    Glucose 131 (H) 65 - 99 mg/dL    Bun 11 8 - 22 mg/dL    Creatinine 0.95 0.50 - 1.40 mg/dL    Calcium 9.0 8.5 - 10.5 mg/dL    AST(SGOT) 19 12 - 45 U/L    ALT(SGPT) 17 2 - 50 U/L    Alkaline Phosphatase 71 30 - 99 U/L    Total Bilirubin 0.6 0.1 - 1.5 mg/dL    Albumin 3.9 3.2 - 4.9 g/dL    Total  Protein 6.1 6.0 - 8.2 g/dL    Globulin 2.2 1.9 - 3.5 g/dL    A-G Ratio 1.8 g/dL   PROTHROMBIN TIME   Result Value Ref Range    PT 13.2 12.0 - 14.6 sec    INR 1.03 0.87 - 1.13   APTT   Result Value Ref Range    APTT 23.5 (L) 24.7 - 36.0 sec   TROPONIN   Result Value Ref Range    Troponin T 11 6 - 19 ng/L   ESTIMATED GFR   Result Value Ref Range    GFR If African American >60 >60 mL/min/1.73 m 2    GFR If Non African American >60 >60 mL/min/1.73 m 2   EKG (NOW)   Result Value Ref Range    Report       [facility] Emergency Dept.    Test Date:  2021  Pt Name:    SUSY MARCUS                Department: ER  MRN:        [id number]                      Room:       [facility]  Gender:     Male                         Technician: [id number]  :        [id number]                   Requested By:SONIA LANCE  Order #:    [id number]                    Reading MD: SONIA LANCE MD    Measurements  Intervals                                Axis  Rate:       74                           P:          47  FL:         168                          QRS:        45  QRSD:       144                          T:          15  QT:         424  QTc:        471    Interpretive Statements  SINUS RHYTHM  RIGHT BUNDLE BRANCH BLOCK  Compared to ECG 2016 16:28:16  Right bundle-branch block now present  Ventricular premature complex(es) no longer present  T-wave abnormality no longer present  Electronically Signed On 2021 4:45:47 PDT by SONIA LANCE MD         COURSE & MEDICAL DECISION MAKING  Patient presents with ground-level fall, head injury and right-sided hemiplegia.  He was seen immediately on arrival at the charge desk and taken directly to CT scan.  Following CT scan he was examined with the results as noted above.  Noncontrast CT scan does not show hemorrhage.  Onset of symptoms time is not known.  Patient is not an alteplase candidate.  CTA returned showing M1 occlusion.  Interventional radiology and neuro  were paged.  I discussed the case with Dr. Chávez and Dr. Smith.  Plan was to proceed with neuro intervention.  I spoke with the patient about this and he said that he did not want to have any procedures.  He is alert and oriented.  Repeatedly told him that this procedure would be in his best interest.  He continued to decline.  Thankfully when Dr. Chávez spoke with the patient he changed his mind and agreed to interventional procedure.  Patient was given aspirin while in the ER.  I consulted Dr. Holloway, the intensivist.  I paged the hospitalist.  Spoke with [place]     FINAL IMPRESSION  1.  ICerebrovascular accident    CRITICAL CARE TIME 30 minutes  There was a very real possibility of deterioration of the patient's condition.  This patient required the highest level of care.  I provided critical care services which included: review of the medical record, treatment orders, ordering and reviewing test results, frequent reevaluation of the patient's condition and response to treatment, as well as discussing the case with appropriate personnel and various consultants. The critical care time associated with the care of this patient is exclusive of any procedures or specific interventions.      This dictation was created using voice recognition software. The accuracy of the dictation is limited to the abilities of the software. I expect there may be some errors of grammar and possibly content. The nursing notes were reviewed and certain aspects of this information were incorporated into this note.      Electronically signed by: Riaz Ren M.D., 6/20/2021 4:17 AM

## 2021-06-20 NOTE — OR SURGEON
Immediate Post- Operative Note        PostOp Diagnosis: left MCA occlusion, acute stroke    Procedure(s): Cerebral Angiogram and Left MCA mechanical thrombectomy    Findings: left MCA Occlusion TICI 2C recanalization achieved.    Access: Rt CFA 8 fr sheath closed with Angioseal.    Estimated Blood Loss:~ 50 ml    Complications: None.    Discussed with Neurohospitalist and ICU physician Dr. Daniel    Post procedure:    Patient to ICU  Maintain SBP strictly between 110-140 mm Hg       Renea Smith M.D.

## 2021-06-20 NOTE — CONSULTS
Tele-Stroke Consultation Note:     Hospital: [facility]  Referring Physician: Riaz Ren M.D.    Reason for consultation: Stroke    Last Known Well: Unknown    TPA Decision: No TPA due to unknown last known well.     HPI: Morgan Davis is a 70 y.o. male with history of schizophrenia, HTN, HLD, thyroid disease presenting to the hospital for stroke and consulted for stroke. The patient had a fall in his group home at some point on 21. His LKW is unknown. He was noted to have R sided weakness and R sided facial droop which prompted him to be brought to the E.D. CT Head W/O CST showed favorable ASPECT score. CTA showed a LM1 occlusion. CTP showed large mismatch. Patient was ineligible for TPA and was subsequently determined to be a thrombectomy candidate but was declining the procedure.     ROS:     As above. All other systems reviewed and are negative.    Past Medical History:    has a past medical history of Hyperlipidemia, Hypertension, Schizophrenia (HCC), and Thyroid disease.    FHx:  Father  of stroke.     SHx:   reports that he has been smoking cigarettes. He has a 54.00 pack-year smoking history. He has never used smokeless tobacco. He reports that he does not drink alcohol and does not use drugs.    Allergies:  Allergies   Allergen Reactions   • Nkda [No Known Drug Allergy]        Medications:    Current Facility-Administered Medications:   •  tetanus-dipth-acell pertussis (ADACEL) inj 0.5 mL, 0.5 mL, Intramuscular, Once PRN, Riaz Ren M.D.  •  aspirin (ASA) suppository 600 mg, 600 mg, Rectal, Once, Riaz Ren M.D.    Current Outpatient Medications:   •  folic acid (FOLVITE) 1 MG Tab, Take 1 mg by mouth every day., Disp: , Rfl:   •  simvastatin (ZOCOR) 20 MG Tab, Take 20 mg by mouth every evening., Disp: , Rfl:   •  tiotropium (SPIRIVA HANDIHALER) 18 MCG Cap, Inhale 18 mcg by mouth every day., Disp: , Rfl:   •  trazodone (DESYREL) 150 MG Tab, Take 1 Tab by mouth  "every evening., Disp: 15 Tab, Rfl: 0  •  acetaminophen 650 MG Tab, Take 650 mg by mouth every 6 hours as needed (Mild Pain; (Pain scale 1-3); Temp greater than 100.5 F)., Disp: 30 Tab, Rfl: 0  •  aspirin (ASA) 81 MG Chew Tab chewable tablet, Take 1 Tab by mouth every day., Disp: 100 Tab, Rfl: 11  •  cyanocobalamin (VITAMIN B12) 1000 MCG Tab, Take 1 Tab by mouth every day., Disp: 30 Tab, Rfl: 3  •  multivitamin (THERAGRAN) Tab, Take 1 Tab by mouth every day., Disp: 30 Tab, Rfl:   •  vitamin D, Ergocalciferol, (DRISDOL) 92643 UNITS Cap capsule, Take 1 Cap by mouth every 7 days., Disp: 30 Cap, Rfl:   •  levothyroxine (SYNTHROID) 75 MCG Tab, Take 37.5 mcg by mouth Every morning on an empty stomach., Disp: , Rfl:   •  famotidine (PEPCID) 20 MG Tab, Take 20 mg by mouth 2 times a day., Disp: , Rfl:   •  docusate sodium (COLACE) 100 MG Cap, Take 100 mg by mouth 2 times a day., Disp: , Rfl:   •  thiamine (THIAMINE) 100 MG Tab, Take 100 mg by mouth every day., Disp: , Rfl:   •  trazodone (DESYREL) 150 MG TABS, Take 150 mg by mouth every bedtime., Disp: , Rfl:   •  lisinopril (PRINIVIL) 10 MG TABS, Take 5 mg by mouth every day., Disp: , Rfl:   •  olanzapine (ZYPREXA) 20 MG tablet, Take 15 mg by mouth every bedtime., Disp: , Rfl:     Outpatient Medications:   (Not in a hospital admission)      Vitals:   Vitals:    06/20/21 0439   BP: 142/86   Pulse: 75   Resp: 20   Weight: 74.8 kg (165 lb)   Height: 1.778 m (5' 10\")       Labs:  Lab Results   Component Value Date/Time    PROTHROMBTM 13.5 02/23/2016 09:50 AM    INR 1.03 02/23/2016 09:50 AM      Lab Results   Component Value Date/Time    WBC 7.6 06/20/2021 04:00 AM    RBC 5.18 06/20/2021 04:00 AM    HEMOGLOBIN 16.5 06/20/2021 04:00 AM    HEMATOCRIT 49.5 06/20/2021 04:00 AM    MCV 95.6 06/20/2021 04:00 AM    MCH 31.9 06/20/2021 04:00 AM    MCHC 33.3 (L) 06/20/2021 04:00 AM    MPV 10.0 06/20/2021 04:00 AM    NEUTSPOLYS 45.40 06/20/2021 04:00 AM    LYMPHOCYTES 40.70 06/20/2021 04:00 " AM    MONOCYTES 8.70 06/20/2021 04:00 AM    EOSINOPHILS 4.60 06/20/2021 04:00 AM    BASOPHILS 0.10 06/20/2021 04:00 AM      Lab Results   Component Value Date/Time    SODIUM 137 06/20/2021 04:00 AM    POTASSIUM 3.8 06/20/2021 04:00 AM    CHLORIDE 104 06/20/2021 04:00 AM    CO2 23 06/20/2021 04:00 AM    GLUCOSE 131 (H) 06/20/2021 04:00 AM    BUN 11 06/20/2021 04:00 AM    CREATININE 0.95 06/20/2021 04:00 AM      Lab Results   Component Value Date/Time    CHOLSTRLTOT 168 01/18/2018 09:35 AM    LDL 88 01/18/2018 09:35 AM    HDL 65 01/18/2018 09:35 AM    TRIGLYCERIDE 75 01/18/2018 09:35 AM       Lab Results   Component Value Date/Time    ALKPHOSPHAT 71 06/20/2021 04:00 AM    ASTSGOT 19 06/20/2021 04:00 AM    ALTSGPT 17 06/20/2021 04:00 AM    TBILIRUBIN 0.6 06/20/2021 04:00 AM        Imaging:  CT Head W/O CST personally reviewed w/o evidence of acute ischemia or hemorrhage.     CTA Head/Neck reviewed in chart.     CTP Reviewed in chart.     Physical Exam:     General: 69 y/o male in bed in NAD  Cardio: Unable to assess through telemedicine.    Pulm: Unable to assess through telemedicine.   Skin: Unable to assess through telemedicine.   Psychiatric: Appropriate affect. No active psychosis.  HEENT: Unable to assess through telemedicine.   Abdomen: Unable to assess through telemedicine.     Physical Exam:    NIH Stroke Scale:    1a. Level of Consciousness (Alert, drowsy, etc): 0= Alert    1b. LOC Questions (Month, age): 0= Answers both correctly    1c. LOC Commands (Open/close eyes make fist/let go): 0= Obeys both correctly    2.   Best Gaze (Eyes open - patient follows examiner's finger on face): 0= Normal    3.   Visual Fields (introduce visual stimulus/threat to patient's field quadrants): 0= No visual loss  4.   Facial Paresis (Show teeth, raise eyebrows and squeeze eyes shut): 2 = Partial     5a. Motor Arm - Left (Elevate arm to 90 degrees if patient is sitting, 45 degrees if  supine): 0= No drift    5b. Motor Arm -  "Right (Elevate arm to 90 degrees if patient is sitting, 45 degrees if supine): 4= No movement    6a. Motor Leg - Left (Elevate leg 30 degrees with patient supine): 0= No drift    6b. Motor Leg - Right  (Elevate leg 30 degrees with patient supine): 2= Can't resist gravity    7.   Limb Ataxia (Finger-nose, heel down shin): 0= No ataxia    8.   Sensory (Pin prick to face, arm, trunk and leg - compare side to side): 2- Severe loss    9.  Best Language (Name item, describe a picture and read sentences): 0= No aphasia    10. Dysarthria (Evaluate speech clarity by patient repeating listed words): 1= Mild to moderate slurring    11. Extinction and Inattention (Use information from prior testing to identify neglect or  double simultaneous stimuli testing): 0= No neglect    Total NIH Score: 11    Assessment/Plan:    Morgan Davis is a 70 y.o. male with history of schizophrenia, HTN, HLD, thyroid disease presenting to the hospital for stroke and consulted for stroke. Patient presented w/ R sided weakness and was found to have a LM1 occlusion. While he is not a candidate for TPA he is a good candidate for thrombectomy given his imaging. The patient was declining thrombectomy at the time of my initial evaluation. He stated he \"just fell out of bed\" and that he wanted to go back to his group home. I explained to him that he was having a large stroke, and that he had a clot in his head. He retorted that he did not want surgery. He did not understand what a thrombectomy entailed. I explained to him this required a groin incision and catheter would be advanced to his head where the clot would be extracted. I explained this did not require an incision to his head. While still apprehensive he seemed more open to the procedure. I then reinforced that he was having a stroke due to a clot in his brain and that the procedure could benefit him since his symptoms would otherwise continue to get worse. He shared that his father had passed " away from a stroke, and at that time agreed to go through with the procedure.     Plan:  -No TPA  -IR consult for thrombectomy.   -Admit to ICU  -SBP Goals per IR pending on TICI Grade.   -Obtain STAT head CT for acute neurologic deterioration, otherwise obtain MRI Brain W/O CST  -Neuro checks/vital signs per protocol.  -Obtain CBC/CMP/TSH/LDL/Hgb A1C  -Obtain Echocardiogram.  -Initiate smoking cessation/stroke education  -Schedule evaluation with PT/OT/ST  -Plan discussed with consulting physician and patient's nurse      This consultation was conducted utilizing secure and encrypted videoconferencing equipment with the assistance of a trained tele-presenter at the originating site.      Chris Monroy M.D., Diplomat of the American Board of Psychiatry and Neurology  Diplomat of [employer] Epilepsy Subspecialty   Assistant Clinical Professor, [facility] Neurology Consultant

## 2021-06-20 NOTE — ED NOTES
Unable to complete med rec at this time.   I am currently trying to get a hold of group home. Social work gave me the phone number of group home [phone]. Patient stays at [facility]. Called number but does not ring and goes straight to . Mailbox is full and does not accept any more messages. Called home phone on chart [phone] and smoke with someone that regave me [phone] number but did report he gets the meds [facility]

## 2021-06-20 NOTE — PROGRESS NOTES
Neurology brief note    Morgan had just received ativan for his repeat head CT, taken after it was noted he was unable to move his RUE.  Repeat CT with evolving L MCA stroke, no hemorrhage was seen.  Will continue with post-thrombectomy care-      - SBP goal 110-140 to mitigate risk of reperfusion injury    - no antithrombotic therapy pending MRI results    - MRI brain without contrast     - TTE, long-term cardiac monitoring at discharge   - stroke labs:  HgbA1c and lipid panel   - start atorvastatin 80mg daily when able for LDL goal < 70   - ok for PT/OT/SLP when able    Rosendo Waite MD  Neurology

## 2021-06-20 NOTE — ASSESSMENT & PLAN NOTE
Was not a candidate for alteplase    ---Underwent left MCA thrombectomy on 6/19/2021 continue aspirin and atorvastatin    --PT/OT/SLP , continue Aspiration precautions  Patient is DNR/DNI with no reintubation   Continue asa/statin  Family proceeded with comfort care measures 6/28  Continue comfort care measure

## 2021-06-20 NOTE — H&P
History & Physical Note    Date of Admission: 6/20/2021  Admission Status: Inpatient      Chief Complaint: Fall, right sided weakness     History of Present Illness (HPI):   Mr. Davis is a 70-year old male with past medical history of schizophrenia, hypertension, hypothyroidism, COPD, non-insulin dependent diabetes on Aspirin who presented to the hospital from his group home after falling out of his bed in the morning and subsequently found to have right sided weakness. At the time of evaluation patient is severely dysarthric and history is obtained from the chart.     In the ED, patient was noted to be hemodynamically stable blood pressure had increased to the 180s. CT head was negative for acute abnormality. CTA head was notable for a thrombus in the distal M1 segment of the left middle cerebral artery. Patient was not a candidate for TPA given last unknown well time. IR was consulted for thrombectomy which the patient underwent without complication.     Patient was then transferred to the ICU and was noted to no longer be moving his right upper extremity which had previously been documented that he had been. Repeated stat head CT which remained unremarkable.       Review of Systems:   Review of Systems   Unable to perform ROS: Acuity of condition       Past Medical History:   Past Medical History was reviewed with patient.   has a past medical history of Hyperlipidemia, Hypertension, Schizophrenia (HCC), and Thyroid disease.    Past Surgical History: Past Surgical History was reviewed with patient.   has no past surgical history on file.    Medications: Medications have been reviewed with patient.  Prior to Admission Medications   Prescriptions Last Dose Informant Patient Reported? Taking?   Umeclidinium Bromide (INCRUSE ELLIPTA) 62.5 MCG/INH AEROSOL POWDER, BREATH ACTIVATED 6/19/2021 at [facility] Caregiver Yes Yes   Sig: Inhale 1 Puff every day.   acetaminophen 650 MG Tab Not Taking at Not Taking Caregiver No No    Sig: Take 650 mg by mouth every 6 hours as needed (Mild Pain; (Pain scale 1-3); Temp greater than 100.5 F).   Patient not taking: Reported on 6/20/2021   aspirin (ASA) 81 MG Chew Tab chewable tablet Not Taking at Not Taking Caregiver No No   Sig: Take 1 Tab by mouth every day.   Patient not taking: Reported on 6/20/2021   cyanocobalamin (VITAMIN B12) 1000 MCG Tab 6/19/2021 at 2000 Caregiver No No   Sig: Take 1 Tab by mouth every day.   fenofibrate (TRICOR) 48 MG Tab 6/19/2021 at 2000 Caregiver Yes Yes   Sig: Take 48 mg by mouth every evening.   folic acid (FOLVITE) 1 MG Tab 6/19/2021 at 0800 Caregiver Yes No   Sig: Take 1 mg by mouth every morning.   levothyroxine (SYNTHROID) 25 MCG Tab 6/19/2021 at 1000 Caregiver Yes Yes   Sig: Take 25 mcg by mouth every morning on an empty stomach.   lisinopril (PRINIVIL) 2.5 MG Tab 6/19/2021 at 0800 Caregiver Yes No   Sig: Take 2.5 mg by mouth every morning.   metFORMIN (GLUCOPHAGE) 500 MG Tab 6/19/2021 at 2000 Caregiver Yes Yes   Sig: Take 500 mg by mouth 2 times a day.   multivitamin (THERAGRAN) Tab Not Taking at Not Taking Caregiver No No   Sig: Take 1 Tab by mouth every day.   Patient not taking: Reported on 6/20/2021   olanzapine (ZYPREXA) 10 MG tablet 6/19/2021 at 2000 Caregiver Yes No   Sig: Take 10 mg by mouth at bedtime.   thiamine (THIAMINE) 100 MG Tab 6/19/2021 at 2000 Caregiver Yes No   Sig: Take 100 mg by mouth every evening.   tiotropium (SPIRIVA HANDIHALER) 18 MCG Cap 6/19/2021 at [facility] Caregiver Yes No   Sig: Inhale 18 mcg by mouth every day.   trazodone (DESYREL) 150 MG Tab 6/19/2021 at PM Caregiver No No   Sig: Take 1 Tab by mouth every evening.   vitamin D, Ergocalciferol, (DRISDOL) 92049 UNITS Cap capsule Not Taking at Not Taking Caregiver No No   Sig: Take 1 Cap by mouth every 7 days.   Patient not taking: Reported on 6/20/2021      Facility-Administered Medications: None        Allergies: Allergies have been reviewed with patient.  No Known  Allergies    Family History:  family history is not on file.     Social History:   Social History     Tobacco Use   • Smoking status: Current Every Day Smoker     Packs/day: 1.00     Years: 54.00     Pack years: 54.00     Types: Cigarettes   • Smokeless tobacco: Never Used   • Tobacco comment: since age 15   Substance Use Topics   • Alcohol use: No   • Drug use: No       Pulse:  [66-92] 85  Resp:  [12-32] 31  BP: (129-182)/() 140/63  SpO2:  [89 %-98 %] 94 %    Physical Exam  Constitutional:       Comments: Laying in bed, appears agitated. Very dysarthric    HENT:      Head: Normocephalic and atraumatic.      Mouth/Throat:      Mouth: Mucous membranes are moist.   Eyes:      Pupils: Pupils are equal, round, and reactive to light.   Cardiovascular:      Rate and Rhythm: Normal rate and regular rhythm.      Pulses: Normal pulses.   Pulmonary:      Effort: No respiratory distress.      Breath sounds: No wheezing, rhonchi or rales.   Abdominal:      General: Bowel sounds are normal. There is no distension.      Palpations: Abdomen is soft.      Tenderness: There is no abdominal tenderness.   Musculoskeletal:      Right lower leg: No edema.      Left lower leg: No edema.   Skin:     General: Skin is warm and dry.   Neurological:      Comments: Right facial droop, dysarthric speech, obeys commands, RUE 0/5, LUE 3/5, RLE 1/5, LLE 3/5, shaking and pulling at lines         Previous Data Review: reviewed      * Acute ischemic stroke (HCC)- (present on admission)  Assessment & Plan  Patient presented from group home with right sided weakness. Initial CT head without acute abnormality. CTA head was notable for a thrombus in the distal M1 segment of hte left middle cerebral artery. Patient was not a candidate for TPA given unknown last well time. Patient underwent Left MCA mechanical thrombectomy on 6/19.   - Neurology on board   - Neurochecks per protocol   - Goal SBP < 140  - Nicardipine drip   - MRI brain pending   -  Echocardiogram   - Aspirin   - High intensity statin  - Precedex for agitation  - Speech evaluation   - PT/OT when clinically appropriate     Hypertension- (present on admission)  Assessment & Plan  Home regimen: Lisinopril 2.5mg   - Currently elevated  - Continue Lisinopril   - Nicardipine drip SBP < 140  - Hydralazine prn   - Labetalol prn    Hypothyroidism- (present on admission)  Assessment & Plan  - Check TSH   - Continue Synthroid    Type 2 diabetes mellitus, without long-term current use of insulin (HCC)- (present on admission)  Assessment & Plan  Hba1c 5.9% on admission on Metformin   - Sliding scale insulin   - Accuchecks    Hyperlipidemia- (present on admission)  Assessment & Plan  - Follow up on Lipid panel   - Continue home fenofibrate  - started Atorvastatin     Schizophrenia (HCC)- (present on admission)  Assessment & Plan  History of. QTc 470ms  - Continue Zyprexa   - Continue Trazodone

## 2021-06-20 NOTE — PROGRESS NOTES
Called by Dr. Ren    69 y/o male w/ hx of schizophrenia reportedly living in a group home presents for stroke like symptoms. Patient reportedly got out of bed and had a fall with subsequent R sided weakness. CT Head W/O CST shows a favorable ASPECT score (although somewhat degraded by motion). CTA shows a LM1 occlusion, and CTP shows considerable mismatch. Unfortunately not a TPA candidate due to unknown LKW, however imaging suggests stroke is relatively recent. He has relatively good collateral flow. I spoke w/ Dr. Smith and discussed imaging. Given the NIHSS of 13 obtained by ER and given the patient's imaging it appears reasonable to proceed with thrombectomy.           Addendum: Corrected laterality. Occlusion is LMCA not RMCA.

## 2021-06-20 NOTE — DISCHARGE PLANNING
Medical Social Work     The ERP requested SW assistance with seeing if the pt has advance directives for a POA. KEN called the pt group home and spoke with Eileen the house worker. Eileen advised this SW that the pt is his own POA.     KEN advised the ERP that the pt is his own POA.     Group Home  [address] [place] [address]      Group home #[phone]

## 2021-06-20 NOTE — ASSESSMENT & PLAN NOTE
Monitor blood pressure    currently his blood pressure is noted to be well controlled    Continue comfort measure

## 2021-06-20 NOTE — PROGRESS NOTES
Critical care progress note:    Seen per myself and seen/discussed with resident.  Agree with findings except as noted in my note.  Please see my note below for further details.    70 year old that presented with right side weakness.  Past tpa window. Now s/p thrombectomy with tici 2c flow. Goal sbp 110-140, nicardipine drip. q 1 hour neuro checks.  Left MCA occlusion.  PMH schizophrenia, hypertension, dyslipidemia and thyroid disease and lives at group home. Alert and oriented. Shaking but no seizure activity.  Right side worsened since IR, stat CT head ordered.      Addendum  Hypoxia and mental status fluctuation. Continued monitoring for respiratory failure requiring intubation.  ABG compensating.      Addendum  Required intubation.  No complications.  No significant secretions on followup despite adequate cough. No need for bronchoscopy.  Poor airway protection.  On precedex and fentanyl drip. Hypotensive and required IVF and started sodium bicarb drip with push.  Attempted to contact brother but unsuccessful    Reviewed chart notes, orders, labs and imaging    Assessment and Plan:  Left MCA ischemic stroke, thrombus.  S/p thrombectomy with tici 2c flow.  Goal sbp 110-140.  q 1 hour neuro checks up to 24 hours following q 15 min for 2 hours post thrombectomy, then q 30 min for 6 hours.  Worsening right side weakness, repeat CT head.    Right side weakness    Respiratory failure with hypoxia, high risk for intubation given mental status and airway protection, continue monitoring.  Addendum, intubated, precedex and fentanyl drips.  Continued suctioning. Respiratory culture, unasyn given aspiration and xray changes.  Monitor for bronchoscopy but currently unnecessary.    Aspiration into airway    Shock, hypovolemic and medication induced    Patient is critically ill. S/p thrombectomy for acute stroke.  q 1 hour neuro checks up to 24 hours following q 15 min for 2 hours post thrombectomy, then q 30 min for 6 hours.   Goal -140, nicardipine drip.  Precedex drip for sedation.  Addendum, required intubation, mechanical ventilator.  precedex and fentanyl drip for sedation. Levophed for map > 65 and sbp > 100.  If untreated there is a high chance of deterioration and eventually death. The critical that has been undertaken is medically complex. There has been no overlap in critical care time. Critical Care Time not including procedures: 80 minutes.

## 2021-06-20 NOTE — PROGRESS NOTES
Timeout performed.   Prior to time out attempted to call brother in contacts. Voicemail was full and second number had no answer.

## 2021-06-21 ENCOUNTER — APPOINTMENT (OUTPATIENT)
Dept: RADIOLOGY | Facility: MEDICAL CENTER | Age: 71
DRG: 023 | End: 2021-06-21
Attending: RADIOLOGY
Payer: MEDICARE

## 2021-06-21 ENCOUNTER — APPOINTMENT (OUTPATIENT)
Dept: CARDIOLOGY | Facility: MEDICAL CENTER | Age: 71
DRG: 023 | End: 2021-06-21
Attending: STUDENT IN AN ORGANIZED HEALTH CARE EDUCATION/TRAINING PROGRAM
Payer: MEDICARE

## 2021-06-21 ENCOUNTER — APPOINTMENT (OUTPATIENT)
Dept: RADIOLOGY | Facility: MEDICAL CENTER | Age: 71
DRG: 023 | End: 2021-06-21
Attending: STUDENT IN AN ORGANIZED HEALTH CARE EDUCATION/TRAINING PROGRAM
Payer: MEDICARE

## 2021-06-21 PROBLEM — J96.01 ACUTE RESPIRATORY FAILURE WITH HYPOXIA (HCC): Status: ACTIVE | Noted: 2021-06-21

## 2021-06-21 PROBLEM — R00.8 VENTRICULAR BIGEMINY SEEN ON CARDIAC MONITOR: Status: ACTIVE | Noted: 2021-06-21

## 2021-06-21 LAB
ALBUMIN SERPL BCP-MCNC: 3.4 G/DL (ref 3.2–4.9)
ALBUMIN/GLOB SERPL: 1.7 G/DL
ALP SERPL-CCNC: 62 U/L (ref 30–99)
ALT SERPL-CCNC: 15 U/L (ref 2–50)
ANION GAP SERPL CALC-SCNC: 10 MMOL/L (ref 7–16)
AST SERPL-CCNC: 21 U/L (ref 12–45)
BASE EXCESS BLDA CALC-SCNC: 1 MMOL/L (ref -4–3)
BASE EXCESS BLDA CALC-SCNC: 1 MMOL/L (ref -4–3)
BASOPHILS # BLD AUTO: 0.1 % (ref 0–1.8)
BASOPHILS # BLD: 0.01 K/UL (ref 0–0.12)
BILIRUB SERPL-MCNC: 0.6 MG/DL (ref 0.1–1.5)
BODY TEMPERATURE: ABNORMAL DEGREES
BODY TEMPERATURE: ABNORMAL DEGREES
BREATHS SETTING VENT: 16
BREATHS SETTING VENT: 17
BUN SERPL-MCNC: 11 MG/DL (ref 8–22)
CALCIUM SERPL-MCNC: 8.3 MG/DL (ref 8.5–10.5)
CHLORIDE SERPL-SCNC: 111 MMOL/L (ref 96–112)
CHOLEST SERPL-MCNC: 148 MG/DL (ref 100–199)
CO2 BLDA-SCNC: 26 MMOL/L (ref 20–33)
CO2 BLDA-SCNC: 26 MMOL/L (ref 20–33)
CO2 SERPL-SCNC: 23 MMOL/L (ref 20–33)
CREAT SERPL-MCNC: 0.84 MG/DL (ref 0.5–1.4)
CRP SERPL HS-MCNC: 3.31 MG/DL (ref 0–0.75)
DELSYS IDSYS: ABNORMAL
DELSYS IDSYS: ABNORMAL
END TIDAL CARBON DIOXIDE IECO2: 26 MMHG
END TIDAL CARBON DIOXIDE IECO2: 28 MMHG
EOSINOPHIL # BLD AUTO: 0 K/UL (ref 0–0.51)
EOSINOPHIL NFR BLD: 0 % (ref 0–6.9)
ERYTHROCYTE [DISTWIDTH] IN BLOOD BY AUTOMATED COUNT: 48.4 FL (ref 35.9–50)
GLOBULIN SER CALC-MCNC: 2 G/DL (ref 1.9–3.5)
GLUCOSE BLD-MCNC: 103 MG/DL (ref 65–99)
GLUCOSE BLD-MCNC: 105 MG/DL (ref 65–99)
GLUCOSE BLD-MCNC: 107 MG/DL (ref 65–99)
GLUCOSE BLD-MCNC: 145 MG/DL (ref 65–99)
GLUCOSE SERPL-MCNC: 141 MG/DL (ref 65–99)
HCO3 BLDA-SCNC: 24.7 MMOL/L (ref 17–25)
HCO3 BLDA-SCNC: 25.1 MMOL/L (ref 17–25)
HCT VFR BLD AUTO: 44.9 % (ref 42–52)
HDLC SERPL-MCNC: 41 MG/DL
HGB BLD-MCNC: 15 G/DL (ref 14–18)
HOROWITZ INDEX BLDA+IHG-RTO: 413 MM[HG]
HOROWITZ INDEX BLDA+IHG-RTO: 486 MM[HG]
IMM GRANULOCYTES # BLD AUTO: 0.11 K/UL (ref 0–0.11)
IMM GRANULOCYTES NFR BLD AUTO: 0.6 % (ref 0–0.9)
LDLC SERPL CALC-MCNC: 85 MG/DL
LV EJECT FRACT  99904: 60
LV EJECT FRACT MOD 2C 99903: 73.59
LV EJECT FRACT MOD 4C 99902: 40.1
LV EJECT FRACT MOD BP 99901: 60.07
LYMPHOCYTES # BLD AUTO: 2.19 K/UL (ref 1–4.8)
LYMPHOCYTES NFR BLD: 12.6 % (ref 22–41)
MAGNESIUM SERPL-MCNC: 2.3 MG/DL (ref 1.5–2.5)
MCH RBC QN AUTO: 31.6 PG (ref 27–33)
MCHC RBC AUTO-ENTMCNC: 33.4 G/DL (ref 33.7–35.3)
MCV RBC AUTO: 94.7 FL (ref 81.4–97.8)
MODE IMODE: ABNORMAL
MODE IMODE: ABNORMAL
MONOCYTES # BLD AUTO: 1.78 K/UL (ref 0–0.85)
MONOCYTES NFR BLD AUTO: 10.2 % (ref 0–13.4)
NEUTROPHILS # BLD AUTO: 13.29 K/UL (ref 1.82–7.42)
NEUTROPHILS NFR BLD: 76.5 % (ref 44–72)
NRBC # BLD AUTO: 0 K/UL
NRBC BLD-RTO: 0 /100 WBC
O2/TOTAL GAS SETTING VFR VENT: 60 %
O2/TOTAL GAS SETTING VFR VENT: 80 %
PCO2 BLDA: 37 MMHG (ref 26–37)
PCO2 BLDA: 37.3 MMHG (ref 26–37)
PCO2 TEMP ADJ BLDA: 38.3 MMHG (ref 26–37)
PCO2 TEMP ADJ BLDA: 38.5 MMHG (ref 26–37)
PEEP END EXPIRATORY PRESSURE IPEEP: 12 CMH20
PEEP END EXPIRATORY PRESSURE IPEEP: 14 CMH20
PH BLDA: 7.43 [PH] (ref 7.4–7.5)
PH BLDA: 7.44 [PH] (ref 7.4–7.5)
PH TEMP ADJ BLDA: 7.42 [PH] (ref 7.4–7.5)
PH TEMP ADJ BLDA: 7.43 [PH] (ref 7.4–7.5)
PHOSPHATE SERPL-MCNC: 2.5 MG/DL (ref 2.5–4.5)
PLATELET # BLD AUTO: 188 K/UL (ref 164–446)
PMV BLD AUTO: 10.4 FL (ref 9–12.9)
PO2 BLDA: 248 MMHG (ref 64–87)
PO2 BLDA: 389 MMHG (ref 64–87)
PO2 TEMP ADJ BLDA: 253 MMHG (ref 64–87)
PO2 TEMP ADJ BLDA: 392 MMHG (ref 64–87)
POTASSIUM SERPL-SCNC: 4 MMOL/L (ref 3.6–5.5)
PREALB SERPL-MCNC: 16.5 MG/DL (ref 18–38)
PROCALCITONIN SERPL-MCNC: 0.13 NG/ML
PROT SERPL-MCNC: 5.4 G/DL (ref 6–8.2)
RBC # BLD AUTO: 4.74 M/UL (ref 4.7–6.1)
SAO2 % BLDA: 100 % (ref 93–99)
SAO2 % BLDA: 100 % (ref 93–99)
SODIUM SERPL-SCNC: 144 MMOL/L (ref 135–145)
SPECIMEN DRAWN FROM PATIENT: ABNORMAL
SPECIMEN DRAWN FROM PATIENT: ABNORMAL
TIDAL VOLUME IVT: 440 ML
TIDAL VOLUME IVT: 440 ML
TRIGL SERPL-MCNC: 108 MG/DL (ref 0–149)
TRIGL SERPL-MCNC: 114 MG/DL (ref 0–149)
WBC # BLD AUTO: 17.4 K/UL (ref 4.8–10.8)

## 2021-06-21 PROCEDURE — 80061 LIPID PANEL: CPT

## 2021-06-21 PROCEDURE — A9270 NON-COVERED ITEM OR SERVICE: HCPCS | Performed by: INTERNAL MEDICINE

## 2021-06-21 PROCEDURE — 700105 HCHG RX REV CODE 258: Performed by: INTERNAL MEDICINE

## 2021-06-21 PROCEDURE — 97167 OT EVAL HIGH COMPLEX 60 MIN: CPT

## 2021-06-21 PROCEDURE — 71045 X-RAY EXAM CHEST 1 VIEW: CPT

## 2021-06-21 PROCEDURE — 700102 HCHG RX REV CODE 250 W/ 637 OVERRIDE(OP): Performed by: INTERNAL MEDICINE

## 2021-06-21 PROCEDURE — 700111 HCHG RX REV CODE 636 W/ 250 OVERRIDE (IP): Performed by: STUDENT IN AN ORGANIZED HEALTH CARE EDUCATION/TRAINING PROGRAM

## 2021-06-21 PROCEDURE — 93306 TTE W/DOPPLER COMPLETE: CPT | Mod: 26 | Performed by: INTERNAL MEDICINE

## 2021-06-21 PROCEDURE — 700105 HCHG RX REV CODE 258: Performed by: PSYCHIATRY & NEUROLOGY

## 2021-06-21 PROCEDURE — 700102 HCHG RX REV CODE 250 W/ 637 OVERRIDE(OP): Performed by: STUDENT IN AN ORGANIZED HEALTH CARE EDUCATION/TRAINING PROGRAM

## 2021-06-21 PROCEDURE — 94150 VITAL CAPACITY TEST: CPT

## 2021-06-21 PROCEDURE — 94799 UNLISTED PULMONARY SVC/PX: CPT

## 2021-06-21 PROCEDURE — 84100 ASSAY OF PHOSPHORUS: CPT

## 2021-06-21 PROCEDURE — 70551 MRI BRAIN STEM W/O DYE: CPT | Mod: MG

## 2021-06-21 PROCEDURE — 94003 VENT MGMT INPAT SUBQ DAY: CPT

## 2021-06-21 PROCEDURE — 80053 COMPREHEN METABOLIC PANEL: CPT

## 2021-06-21 PROCEDURE — 700111 HCHG RX REV CODE 636 W/ 250 OVERRIDE (IP): Performed by: PSYCHIATRY & NEUROLOGY

## 2021-06-21 PROCEDURE — 82803 BLOOD GASES ANY COMBINATION: CPT

## 2021-06-21 PROCEDURE — 36600 WITHDRAWAL OF ARTERIAL BLOOD: CPT

## 2021-06-21 PROCEDURE — 97162 PT EVAL MOD COMPLEX 30 MIN: CPT

## 2021-06-21 PROCEDURE — 770022 HCHG ROOM/CARE - ICU (200)

## 2021-06-21 PROCEDURE — 84134 ASSAY OF PREALBUMIN: CPT

## 2021-06-21 PROCEDURE — 99291 CRITICAL CARE FIRST HOUR: CPT | Mod: GC | Performed by: PSYCHIATRY & NEUROLOGY

## 2021-06-21 PROCEDURE — 83735 ASSAY OF MAGNESIUM: CPT

## 2021-06-21 PROCEDURE — 70450 CT HEAD/BRAIN W/O DYE: CPT | Mod: MG

## 2021-06-21 PROCEDURE — 84145 PROCALCITONIN (PCT): CPT

## 2021-06-21 PROCEDURE — 99233 SBSQ HOSP IP/OBS HIGH 50: CPT | Performed by: PSYCHIATRY & NEUROLOGY

## 2021-06-21 PROCEDURE — 700102 HCHG RX REV CODE 250 W/ 637 OVERRIDE(OP): Performed by: PSYCHIATRY & NEUROLOGY

## 2021-06-21 PROCEDURE — 700105 HCHG RX REV CODE 258: Performed by: STUDENT IN AN ORGANIZED HEALTH CARE EDUCATION/TRAINING PROGRAM

## 2021-06-21 PROCEDURE — 86140 C-REACTIVE PROTEIN: CPT

## 2021-06-21 PROCEDURE — A9270 NON-COVERED ITEM OR SERVICE: HCPCS | Performed by: STUDENT IN AN ORGANIZED HEALTH CARE EDUCATION/TRAINING PROGRAM

## 2021-06-21 PROCEDURE — 700111 HCHG RX REV CODE 636 W/ 250 OVERRIDE (IP): Performed by: INTERNAL MEDICINE

## 2021-06-21 PROCEDURE — A9270 NON-COVERED ITEM OR SERVICE: HCPCS | Performed by: PSYCHIATRY & NEUROLOGY

## 2021-06-21 PROCEDURE — 85025 COMPLETE CBC W/AUTO DIFF WBC: CPT

## 2021-06-21 PROCEDURE — 93306 TTE W/DOPPLER COMPLETE: CPT

## 2021-06-21 PROCEDURE — L4398 FOOT DROP SPLINT PRE OTS: HCPCS

## 2021-06-21 PROCEDURE — 82962 GLUCOSE BLOOD TEST: CPT | Mod: 91

## 2021-06-21 RX ORDER — OLANZAPINE 5 MG/1
10 TABLET ORAL EVERY EVENING
Status: DISCONTINUED | OUTPATIENT
Start: 2021-06-21 | End: 2021-06-28

## 2021-06-21 RX ORDER — SODIUM CHLORIDE, SODIUM LACTATE, POTASSIUM CHLORIDE, AND CALCIUM CHLORIDE .6; .31; .03; .02 G/100ML; G/100ML; G/100ML; G/100ML
1000 INJECTION, SOLUTION INTRAVENOUS ONCE
Status: COMPLETED | OUTPATIENT
Start: 2021-06-21 | End: 2021-06-21

## 2021-06-21 RX ADMIN — SODIUM CHLORIDE 3 G: 900 INJECTION INTRAVENOUS at 12:16

## 2021-06-21 RX ADMIN — SODIUM CHLORIDE 3 G: 900 INJECTION INTRAVENOUS at 23:38

## 2021-06-21 RX ADMIN — DOCUSATE SODIUM 50 MG AND SENNOSIDES 8.6 MG 2 TABLET: 8.6; 5 TABLET, FILM COATED ORAL at 05:13

## 2021-06-21 RX ADMIN — SODIUM CHLORIDE, POTASSIUM CHLORIDE, SODIUM LACTATE AND CALCIUM CHLORIDE 1000 ML: 600; 310; 30; 20 INJECTION, SOLUTION INTRAVENOUS at 01:04

## 2021-06-21 RX ADMIN — LEVOTHYROXINE SODIUM 25 MCG: 0.03 TABLET ORAL at 05:13

## 2021-06-21 RX ADMIN — FAMOTIDINE 20 MG: 20 TABLET ORAL at 05:13

## 2021-06-21 RX ADMIN — DOCUSATE SODIUM 50 MG AND SENNOSIDES 8.6 MG 2 TABLET: 8.6; 5 TABLET, FILM COATED ORAL at 17:43

## 2021-06-21 RX ADMIN — ACETAMINOPHEN 650 MG: 325 TABLET, FILM COATED ORAL at 05:12

## 2021-06-21 RX ADMIN — PROPOFOL 35 MCG/KG/MIN: 10 INJECTION, EMULSION INTRAVENOUS at 05:44

## 2021-06-21 RX ADMIN — OLANZAPINE 10 MG: 5 TABLET, FILM COATED ORAL at 17:43

## 2021-06-21 RX ADMIN — CYANOCOBALAMIN TAB 500 MCG 1000 MCG: 500 TAB at 05:12

## 2021-06-21 RX ADMIN — FOLIC ACID 1 MG: 1 TABLET ORAL at 05:13

## 2021-06-21 RX ADMIN — ASPIRIN 81 MG: 81 TABLET, CHEWABLE ORAL at 05:13

## 2021-06-21 RX ADMIN — SODIUM CHLORIDE 3 G: 900 INJECTION INTRAVENOUS at 17:43

## 2021-06-21 RX ADMIN — FAMOTIDINE 20 MG: 20 TABLET ORAL at 17:43

## 2021-06-21 RX ADMIN — ATORVASTATIN CALCIUM 80 MG: 80 TABLET, FILM COATED ORAL at 17:44

## 2021-06-21 RX ADMIN — ACETAMINOPHEN 650 MG: 325 TABLET, FILM COATED ORAL at 20:34

## 2021-06-21 RX ADMIN — THIAMINE HCL TAB 100 MG 100 MG: 100 TAB at 20:09

## 2021-06-21 RX ADMIN — PROPOFOL 10 MCG/KG/MIN: 10 INJECTION, EMULSION INTRAVENOUS at 17:38

## 2021-06-21 RX ADMIN — SODIUM CHLORIDE 3 G: 900 INJECTION INTRAVENOUS at 05:30

## 2021-06-21 ASSESSMENT — COGNITIVE AND FUNCTIONAL STATUS - GENERAL
SUGGESTED CMS G CODE MODIFIER MOBILITY: CN
SUGGESTED CMS G CODE MODIFIER DAILY ACTIVITY: CM
MOVING FROM LYING ON BACK TO SITTING ON SIDE OF FLAT BED: UNABLE
CLIMB 3 TO 5 STEPS WITH RAILING: TOTAL
TURNING FROM BACK TO SIDE WHILE IN FLAT BAD: UNABLE
WALKING IN HOSPITAL ROOM: TOTAL
STANDING UP FROM CHAIR USING ARMS: TOTAL
DAILY ACTIVITIY SCORE: 7
MOVING TO AND FROM BED TO CHAIR: UNABLE
DRESSING REGULAR LOWER BODY CLOTHING: TOTAL
MOBILITY SCORE: 6
HELP NEEDED FOR BATHING: TOTAL
PERSONAL GROOMING: A LOT
EATING MEALS: TOTAL
DRESSING REGULAR UPPER BODY CLOTHING: TOTAL
TOILETING: TOTAL

## 2021-06-21 ASSESSMENT — PULMONARY FUNCTION TESTS
FVC: 0
FVC: 0

## 2021-06-21 ASSESSMENT — PAIN DESCRIPTION - PAIN TYPE
TYPE: ACUTE PAIN

## 2021-06-21 ASSESSMENT — GAIT ASSESSMENTS: GAIT LEVEL OF ASSIST: UNABLE TO PARTICIPATE

## 2021-06-21 ASSESSMENT — ACTIVITIES OF DAILY LIVING (ADL): TOILETING: INDEPENDENT

## 2021-06-21 NOTE — PROGRESS NOTES
Paged [facility] re: new bigeminy and bradycardia (down to 42). STAT EKG ordered.    Addendum 21:04: Dr. Franco at bedside.    21:14 Bicarb paused. STAT BMP and Mg ordered, collected, and sent.

## 2021-06-21 NOTE — FLOWSHEET NOTE
06/20/21 1550   Events/Summary/Plan   Events/Summary/Plan Patient intubated for airway protection.

## 2021-06-21 NOTE — FLOWSHEET NOTE
06/20/21 0998   Events/Summary/Plan   Events/Summary/Plan MD decreased PEEP to 14 to see if patient will tolerate it.  Adjust as needed.

## 2021-06-21 NOTE — ASSESSMENT & PLAN NOTE
Ventricular bigeminy on night of 6/20/21  Was on bicarb drip that was discontinued  Precedex discontinued  Continue to monitor

## 2021-06-21 NOTE — THERAPY
Missed Therapy     Patient Name: Morgan Davis  Age:  70 y.o., Sex:  male  Medical Record #: [id number]  Today's Date: 6/21/2021 06/21/21 0855   Treatment Variance   Reason For Missed Therapy Medical - Patient not Able To Participate   Interdisciplinary Plan of Care Collaboration   IDT Collaboration with  Nursing   Collaboration Comments SLP follow up attempted. Pt now intubated, inappropriate for SLP session. SLP will continue to follow and evaluate as appropriate.

## 2021-06-21 NOTE — DIETARY
"Nutrition Support Assessment:  Day 1 of admit.  Morgan Davis is a 70 y.o. male with admitting DX of Acute ischemic stroke.     Current problem list:  1. Acute respiratory failure with hypoxia  2. Ventricular bigeminy seen on cardiac monitor  3. Acute ischemic stroke  4. HTN  5. HLD  6. T2DM  7. Hypothyroidism  8. Schizophrenia     Assessment:  Estimated Nutritional Needs based on:   Height: 175.3 cm (5' 9\")  Weight: 77 kg (169 lb 12.1 oz) - bed scale  Weight to Use in Calculations: 77 kg (169 lb 12.1 oz)  Body mass index is 25.07 kg/m²., BMI classification: overweight    Calculation/Equation: REE per [facility] x 1.2 = 1820 kcals  RMR per [facility] ( VE: 12.3 L/min, Tmax: 38.8 C) = 2101 kcals  Total Calories / day: 1800 - 2100  (Calories / k - 27)  Total Grams Protein / day: 77 - 92  (Grams Protein / k - 1.2)  Total Fluids/day: 1925 (25 ml/kg)     Evaluation:   1. Consult received for TF.   2. Cortrak is in place with confirmed access in \"below the diaphragm with tip at the level of the proximal duodenum\" for enteral access.  3. Pt is currently intubated and sedated. Pt is s/p cerebral angiogram and left MCA mechanical thrombectomy. Noted spont in am but not ready for extubation at this time.   4. Per rounds MRI pending, no BM yet and pt was following commands and nodding.  5. Labs: glucose 141, PAB 16.5, A1C 5.9, POC is 121-170  6. Meds: lipitor, B12, pepcid, folic acid, SSI, synthroid, zyprexa, colace, thiamine, ativan prn, bowel protocol, propofol at 10 mcg/kg/min (4.6 ml/hr = 121 kcals)  7. Last BM: PTA  8. Diabetisource AC is an appropriate formula for blood glucose control in ICU setting at this time.     Malnutrition Risk: unable to fully assess.     Recommendations/Plan:  Start Diabetisource AC @ 25 ml/hr and advance per protocol to 65 ml/hr (goal rate) to provide 1872 kcal (24 kcal/kg), 94 grams protein (1.2 gm/kg), and 1279 ml free water per day.   Fluids per MD. Consider 200 ml free water TID to provide a " total of 1879 ml water per day (TF + free water flushes).  Monitor propofol infusion rate.  Metabolic cart not ordered at this time.    RD following.

## 2021-06-21 NOTE — PROCEDURES
Procedures  Procedure # 1:  Procedure: endotracheal intubation    : Dr Daniel    Reason: Acute respiratory failure with hypoxia, aspiration    Permit was implied secondary to emergent situation. A MAC 3 blade was inserted into the oropharynx at which time the vocal cords were visualized. A 8.0-Spanish endotracheal tube was inserted and visualized going through the vocal cords. The stylette was removed. Colorimetric change was visualized on the CO2 meter. Breath sounds were heard in both lung fields equally. The endotracheal tube was placed at 24 cm, measured at the lips.  Post procedure cxray adequate placement and no pneumothorax.     Complications: none    Procedure # 2:  Procedure: right subclavian central line insertion, us guided    : Dr Daniel    Reason: Shock requiring vasopressors and respiratory failure with hypoxia    The patient's right shoulder region was prepped and draped in sterile fashion using chlorhexidine scrub. Anesthesia was achieved with 1% lidocaine. The right subclavian vein was accessed under ultrasound guidance using a finder needle Venous blood was withdrawn and the needle was withdrawn after a guidewire was advanced through the needle catheter. A small incision was made with a blade scalpel and the needle was exchanged for a dilator over the guidewire until appropriate dilation was obtained. The dilator was removed and a central venous triple-lumen catheter was advanced over the guidewire and secured into place with 2 sutures at 15 cm. At time of procedure completion, all ports aspirated and flushed properly. Post-procedure x-ray adequate placement, no pneumothorax.  Sterile procedure done throughout entire procedure by all participating.    Complications: none    Blood loss: < 3 cc

## 2021-06-21 NOTE — PROGRESS NOTES
Neurology Progress Note  Neurohospitalist Service, [facility] Neurosciences    Referring Physician: Fred Daniel M.D.      Interval History:  Likely aspiration event, required intubation.  No MRI completed.  Repeat CTH with evolving modest size L MCA infarct burden.    Review of systems: In addition to what is detailed in the HPI and/or updated in the interval history, all other systems reviewed and are negative.    Past Medical History, Past Surgical History and Social History reviewed and unchanged from prior    Medications:    Current Facility-Administered Medications:   •  labetalol (NORMODYNE/TRANDATE) injection 10 mg, 10 mg, Intravenous, Q2HRS PRN, Irtqa Ilyas, D.O., 10 mg at 06/20/21 1033  •  niCARdipine (CARDENE) 25 mg in  mL Infusion, 0-15 mg/hr, Intravenous, Continuous, Irtqa Ilyas, D.O., Stopped at 06/20/21 1600  •  hydrALAZINE (APRESOLINE) injection 10-20 mg, 10-20 mg, Intravenous, Q4HRS PRN, Irtqa Ilyas, D.O.  •  enalaprilat (VASOTEC) injection 1.25 mg, 1.25 mg, Intravenous, Q6HRS PRN, Fred Daniel M.D.  •  LORazepam (ATIVAN) injection 1-2 mg, 1-2 mg, Intravenous, Q2HRS PRN, Fred Daniel M.D.  •  insulin regular (HumuLIN R,NovoLIN R) injection, 1-6 Units, Subcutaneous, Q6HRS, 1 Units at 06/20/21 2330 **AND** POC blood glucose manual result, , , Q6H **AND** NOTIFY MD and PharmD, , , Once **AND** [DISCONTINUED] glucose 4 g chewable tablet 16 g, 16 g, Oral, Q15 MIN PRN **AND** dextrose 50% (D50W) injection 50 mL, 50 mL, Intravenous, Q15 MIN PRN, Fred Daniel M.D.  •  Pharmacy Consult: Enteral tube insertion - review meds/change route/product selection, 1 Each, Other, PHARMACY TO DOSE, Irtqtawanda Cottonyas, D.O.  •  aspirin (ASA) chewable tab 81 mg, 81 mg, Enteral Tube, DAILY, Fred Daniel M.D., 81 mg at 06/21/21 0513  •  atorvastatin (LIPITOR) tablet 80 mg, 80 mg, Enteral Tube, Q EVENING, Fred Daniel M.D., 80 mg at 06/20/21 1707  •  cyanocobalamin (VITAMIN B-12) tablet 1,000 mcg, 1,000  mcg, Enteral Tube, DAILY, Fred Daniel M.D., 1,000 mcg at 06/21/21 0512  •  folic acid (FOLVITE) tablet 1 mg, 1 mg, Enteral Tube, QAM, Fred Daniel M.D., 1 mg at 06/21/21 0513  •  levothyroxine (SYNTHROID) tablet 25 mcg, 25 mcg, Enteral Tube, AM ES, Fred Daniel M.D., 25 mcg at 06/21/21 0513  •  lisinopril (PRINIVIL) tablet 2.5 mg, 2.5 mg, Enteral Tube, QAM, Fred Daniel M.D.  •  senna-docusate (PERICOLACE or SENOKOT S) 8.6-50 MG per tablet 2 tablet, 2 tablet, Enteral Tube, BID, 2 tablet at 06/21/21 0513 **AND** polyethylene glycol/lytes (MIRALAX) PACKET 1 Packet, 1 Packet, Enteral Tube, QDAY PRN **AND** magnesium hydroxide (MILK OF MAGNESIA) suspension 30 mL, 30 mL, Enteral Tube, QDAY PRN **AND** bisacodyl (DULCOLAX) suppository 10 mg, 10 mg, Rectal, QDAY PRN, Fred Daniel M.D.  •  thiamine (Vitamin B-1) tablet 100 mg, 100 mg, Enteral Tube, Q EVENING, Fred Daniel M.D., 100 mg at 06/20/21 2043  •  fentaNYL (SUBLIMAZE) 50 mcg/mL in 50mL (Continuous Infusion), , Intravenous, Continuous, LISA Mi.BARBER, Stopped at 06/20/21 1824  •  Respiratory Therapy Consult, , Nebulization, Continuous RT, LISA Mi.O.  •  famotidine (PEPCID) tablet 20 mg, 20 mg, Enteral Tube, Q12HRS, 20 mg at 06/21/21 0513 **OR** famotidine (PEPCID) injection 20 mg, 20 mg, Intravenous, Q12HRS, LISA Mi.O.  •  MD Alert...ICU Electrolyte Replacement per Pharmacy, , Other, PHARMACY TO DOSE, Irtqa Ilyas, D.O.  •  lidocaine (XYLOCAINE) 1 % injection 2 mL, 2 mL, Tracheal Tube, Q30 MIN PRN, Vale Fabian D.O.  •  acetaminophen (Tylenol) tablet 650 mg, 650 mg, Enteral Tube, Q4HRS PRN, Irtatiana Fabian D.O., 650 mg at 06/21/21 0512  •  ampicillin/sulbactam (UNASYN) 3 g in  mL IVPB, 3 g, Intravenous, Q6HRS, Irtadriana Fabian D.O., Stopped at 06/21/21 0600  •  sodium bicarbonate 150 mEq in D5W infusion (premix), , Intravenous, Continuous, Fred Daniel M.D., Paused at 06/20/21 2114  •  norepinephrine (Levophed) 8 mg in 250 mL NS infusion  "(premix), 0-30 mcg/min, Intravenous, Continuous, Fred Daniel M.D., Last Rate: 11.3 mL/hr at 06/21/21 0647, 6 mcg/min at 06/21/21 0647  •  propofol (DIPRIVAN) injection, 0-80 mcg/kg/min, Intravenous, Continuous, Paused at 06/21/21 0714 **AND** Triglycerides Starting now and then Every 3 Days, , , Every 3 Days (0300), Satish aLzar D.O.    Physical Examination:   /61   Pulse 61   Temp 37.8 °C (100 °F) (Temporal)   Resp (!) 23   Ht 1.753 m (5' 9\")   Wt 77 kg (169 lb 12.1 oz)   SpO2 97%   BMI 25.07 kg/m²       General: Eyes closed, aroused to tactile stimulus  Neck: There is normal range of motion  CV: Regular rate   Extremities:  Warm, dry, and intact, without peripheral lower extremity edema    NEUROLOGICAL EXAM:     Mental status: Arouses to tactile.  Speech and language: Intubated, he does follow commands to wiggle toes, and give thumbs up.  Nods appropriately to orientation questions  Cranial nerve exam: Blinks to threat bilaterally, L gaze preference, but does cross midline. There is no nystagmus. Extraocular muscles are intact. R face droop.   Motor exam: LUE/LLE is antigravity, drifts to bed, but from inattention.  RUE is flaccid, RLE with flexion response, but not antigravity  Sensory exam:  Reacts to tactile in all 4 extremities  Coordination: No christina dysmetria on spontaneous movements.      NIHSS: National Institutes of Health Stroke Scale    [1] 1a:Level of Consciousness    0-alert 1-drowsy   2-stupor   3-coma  [0] 1b:LOC Questions                  0-both  1-one      2-neither  [0] 1c:LOC Commands                   0-both  1-one      2-neither  [1] 2: Best Gaze                     0-nl    1-partial  2-forced  [0] 3: Visual Fields                   0-nl    1-partial  2-complete 3-bilat  [1] 4: Facial Paresis                0-nl    1-minor    2-partial  3-full  MOTOR                       0-nl  [4] 5: Right Arm           1-drift  [2] 6: Left Arm             2-some effort vs gravity  [3] 7: " Right Leg           3-no effort vs gravity  [2] 8: Left Leg             4-no movement                             x-untestable  [0] 9: Limb Ataxia                    0-abs   1-1_limb   2-2+_limbs       x-untestable  [0] 10:Sensory                        0-nl    1-partial  2-dense  [0] 11:Best Language/Aphasia         0-nl    1-mild/mod 2-severe   3-mute  [x] 12:Dysarthria                     0-nl    1-mild/mod 2-severe       x-untestable  [0] 13:Neglect/Inattention            0-none  1-partial  2-complete  [14] TOTAL      Objective Data:    Labs:  Lab Results   Component Value Date/Time    PROTHROMBTM 13.2 06/20/2021 04:00 AM    INR 1.03 06/20/2021 04:00 AM      Lab Results   Component Value Date/Time    WBC 17.4 (H) 06/21/2021 04:15 AM    RBC 4.74 06/21/2021 04:15 AM    HEMOGLOBIN 15.0 06/21/2021 04:15 AM    HEMATOCRIT 44.9 06/21/2021 04:15 AM    MCV 94.7 06/21/2021 04:15 AM    MCH 31.6 06/21/2021 04:15 AM    MCHC 33.4 (L) 06/21/2021 04:15 AM    MPV 10.4 06/21/2021 04:15 AM    NEUTSPOLYS 76.50 (H) 06/21/2021 04:15 AM    LYMPHOCYTES 12.60 (L) 06/21/2021 04:15 AM    MONOCYTES 10.20 06/21/2021 04:15 AM    EOSINOPHILS 0.00 06/21/2021 04:15 AM    BASOPHILS 0.10 06/21/2021 04:15 AM      Lab Results   Component Value Date/Time    SODIUM 144 06/21/2021 04:15 AM    POTASSIUM 4.0 06/21/2021 04:15 AM    CHLORIDE 111 06/21/2021 04:15 AM    CO2 23 06/21/2021 04:15 AM    GLUCOSE 141 (H) 06/21/2021 04:15 AM    BUN 11 06/21/2021 04:15 AM    CREATININE 0.84 06/21/2021 04:15 AM      Lab Results   Component Value Date/Time    CHOLSTRLTOT 148 06/21/2021 04:15 AM    LDL 85 06/21/2021 04:15 AM    HDL 41 06/21/2021 04:15 AM    TRIGLYCERIDE 108 06/21/2021 04:15 AM       Lab Results   Component Value Date/Time    ALKPHOSPHAT 62 06/21/2021 04:15 AM    ASTSGOT 21 06/21/2021 04:15 AM    ALTSGPT 15 06/21/2021 04:15 AM    TBILIRUBIN 0.6 06/21/2021 04:15 AM        Imaging/Testing:    I interpreted and/or reviewed the patient's  neuroimaging    CT-HEAD W/O   Final Result      1.  Developing left frontotemporal infarct with associated cortically based low density      2.  Negative for hemorrhage      3.  Underlying cerebral volume loss and white matter change      DX-CHEST-PORTABLE (1 VIEW)   Final Result      No significant change      DX-CHEST-PORTABLE (1 VIEW)   Final Result      Radiographically satisfactory positioning of support hardware and no pneumothorax is seen      DX-ABDOMEN FOR TUBE PLACEMENT   Final Result      Feeding tube extends below the diaphragm with tip at the level of the proximal duodenum. No metallic instruments are identified.      CT-HEAD W/O   Final Result         NO ACUTE ABNORMALITIES ARE NOTED ON CT SCAN OF THE HEAD.      Findings are consistent with atrophy.  Decreased attenuation in the periventricular white matter likely indicates microvascular ischemic disease.      DX-CHEST-PORTABLE (1 VIEW)   Final Result      No acute cardiopulmonary findings.      CT-CTA HEAD WITH & W/O-POST PROCESS   Final Result      Thrombus in the distal M1 segment of the left middle cerebral artery. Reconstitution of the M2 middle cerebral artery branches.               Comment: Results discussed with Dr. Ren at approximately 4:35 AM      CT-CTA NECK WITH & W/O-POST PROCESSING   Final Result      Atherosclerotic disease. Resulting stenosis is less than 50%      CT-CSPINE WITHOUT PLUS RECONS   Final Result      No acute fracture is identified.      CT-CEREBRAL PERFUSION ANALYSIS   Final Result      1.  Cerebral blood flow less than 30% likely representing completed infarct = 60 mL.      2.  T Max more than 6 seconds likely representing combination of completed infarct and ischemia = 231 mL.      3.  Mismatched volume likely representing ischemic brain/penumbra = 171 mL      4.  Please note that the cerebral perfusion was performed on the limited brain tissue around the basal ganglia region. Infarct/ischemia outside the CT  perfusion sections can be missed in this study.      CT-HEAD W/O   Final Result      1.  No acute intracranial findings.      2.  Diffuse atrophy and periventricular white matter changes, consistent with chronic small vessel.               IR-THROMBO MECHANICAL ARTERY,INIT    (Results Pending)   EC-ECHOCARDIOGRAM COMPLETE W/O CONT    (Results Pending)   MR-BRAIN-W/O    (Results Pending)       Assessment and Plan:  Morgan Davis is a 70 year old man with hypertension, hyperlipidemia, presenting with R side weakness found to have a L M1 occlusion.  He is s/p successful endovascular clot retrieval on 6/19.  No MRI completed, CTH with evolving L MCA stroke, modest infarct burden, with no christina hemorrhagic conversion.  He has been started on ASA therapy.  Etiology with cardioembolic or atheroembolic disease.  Will complete embolic workup with ECHO and long-term cardiac monitoring.  Unfortunately, he required intubation for likely aspiration event.      Problem list:  1.  R side weakness  2.  L MCA stroke  3.  Hypertension  4. Hyperlipidemia  5.  Aspiration pneumonia    Plan:   - q4h neurochecks   - MRI brain without contrast    - long-term BP goal is 110-130/60-80; currently on vasopressors for BP support due to sedation    - continue ASA 81mg daily   - LDL 85, may decrease atorvastatin to 40mg daily for goal LDL < 70   - HgbA1c 5.9, at goal < 7   - antibiotics, vent support per primary team   - TTE, long-term cardiac monitor at discharge   - PT/OT/SLP   - consider DVT chemoppx with lovenox SQ   - stroke bridge clinic follow up      The evaluation of the patient, and recommended management, was discussed with the ICU team. I have performed a physical exam and reviewed and updated ROS and Plan today (6/21/2021).     Rosendo Waite MD  Neurohospitalist, Acute Care Services

## 2021-06-21 NOTE — DISCHARGE PLANNING
[facility] Transitional Care Coordination    Referral from:  Dr. Smith  Insurance Provider on Facesheet:  Medicare/Medicaid FFS  Potential Rehab Diagnosis:     Chart review indicates patient has on going medical management, may have terapy needs to possibly meet inpatient rehab facility criteria with the goal of returning to community.    D/C support: [facility]     Physiatry consultation pended while waiting for additional information.  Right-sided weakness.  Left MCA occlusion s/p mechanical thrombectomy.  Intubated.  TCC will follow.  Please reach out sooner if PMR consult requested for medical management.      Last Covid test:  6/20/2021 not detected     Thank you for the referral.

## 2021-06-21 NOTE — PROGRESS NOTES
Radiology Progress Note   Author: TENZIN Regan Date & Time created: 6/21/2021  1:20 PM   Date of admission  6/20/2021    Note to reader: this note follows the APSO format rather than the historical SOAP format. Assessment and plan located at the top of the note for ease of use.    Chief Complaint  70 y.o. male admitted 6/20/2021 with left MCA occlusion, acute stroke    HPI  Patient presented from group home with right sided weakness. Initial CT head without acute abnormality. CTA head was notable for a thrombus in the distal M1 segment of the left MCA. The patient was not a candidate for TPA given unknown last well time. Patient underwent Left MCA mechanical thrombectomy on 6/19    Assessment/Plan  Interval History   Principal Problem:    Acute ischemic stroke (HCC) POA: Yes  Active Problems:    Schizophrenia (HCC) POA: Yes    Hypothyroidism POA: Yes    Hypertension POA: Yes    Hyperlipidemia POA: Yes    Type 2 diabetes mellitus, without long-term current use of insulin (HCC) POA: Yes    Acute respiratory failure with hypoxia (HCC) POA: Yes    Ventricular bigeminy seen on cardiac monitor POA: Clinically Undetermined  Resolved Problems:    * No resolved hospital problems. *       Plan IR  - MRI of head   - Neuro checks per ICU protocol  - SBP <140  - Neuro following      Thank you for allowing Interventional Radiology team to participate in the patients care, if any additonal care or requests are needed in the future please do not hesitate call or place IR order      [id number]  IR:     6/19- Cerebral Angiogram and Left MCA mechanical thrombectomy  Findings: left MCA Occlusion TICI 2C recanalization achieved               Review of Systems  Physical Exam   ROS   Unable to obtain as the patient is intubated   Vitals:    06/21/21 1300   BP:    Pulse: (!) 57   Resp: (!) 35   Temp:    SpO2: 100%        Physical Exam   Vitals and nursing note reviewed.   Constitutional:       Appearance: He is ill-appearing and  toxic-appearing.      Comments: Intubated   HENT:      Mouth/Throat:      Pharynx: Oropharyngeal exudate present.      Comments: Heavy secretions present  Eyes:      Extraocular Movements: Extraocular movements intact.      Pupils: Pupils are equal, round, and reactive to light.   Cardiovascular:      Rate and Rhythm: Regular rhythm. Bradycardia present.   Abdominal:      General: There is no distension.      Palpations: There is no mass.      Tenderness: There is no abdominal tenderness.      Hernia: No hernia is present.   Musculoskeletal:         General: No swelling, tenderness, deformity or signs of injury.      Cervical back: No rigidity or tenderness.   Skin:     Coloration: Skin is not jaundiced or pale.      Findings: No erythema.   Neurological:      Comments: Moves left upper and lower extremity.  Withdraws right upper and lower extremity to pain.  Responds to simple verbal commands with hands.                  Labs    Recent Labs     06/20/21 0400 06/21/21 0415   WBC 7.6 17.4*   RBC 5.18 4.74   HEMOGLOBIN 16.5 15.0   HEMATOCRIT 49.5 44.9   MCV 95.6 94.7   MCH 31.9 31.6   MCHC 33.3* 33.4*   RDW 48.2 48.4   PLATELETCT 159* 188   MPV 10.0 10.4     Recent Labs     06/20/21  0400 06/20/21 2110 06/21/21  0415   SODIUM 137 139 144   POTASSIUM 3.8 4.0 4.0   CHLORIDE 104 106 111   CO2 23 23 23   GLUCOSE 131* 208* 141*   BUN 11 11 11   CREATININE 0.95 0.96 0.84   CALCIUM 9.0 8.6 8.3*         Recent Labs     06/20/21 0400 06/20/21 2110 06/21/21  0415   SODIUM 137 139 144   POTASSIUM 3.8 4.0 4.0   CHLORIDE 104 106 111   CO2 23 23 23   GLUCOSE 131* 208* 141*   BUN 11 11 11     INR   Date Value Ref Range Status   06/20/2021 1.03 0.87 - 1.13 Final     Comment:     INR - Non-therapeutic Reference Range: 0.87-1.13  INR - Therapeutic Reference Range: 2.0-4.0       No results found for: POCINR          Labs not explicitly included in this progress note were reviewed by the author.   Radiology/imaging not explicitly  included in this progress note was reviewed by the   author.     I have performed a physical exam and reviewed and updated ROS and Plan today (6/21/2021). In review of yesterday's note (6/20/2021), there are no changes except as documented above.     25 minutes in directly providing and coordinating care and extensive data review.  No time overlap and excludes procedures.

## 2021-06-21 NOTE — THERAPY
Occupational Therapy   Initial Evaluation     Patient Name: Morgan Davis  Age:  70 y.o., Sex:  male  Medical Record #: [id number]  Today's Date: 6/21/2021     Precautions  Precautions: Fall Risk, Swallow Precautions ( See Comments), Nasogastric Tube  Comments: R hemiparesis; intubated    Assessment  Patient is 70 y.o. male with a diagnosis of GLF, L M1 occlusion s/p L MCA thrombectomy with acute respiratory failure requiring intubation. Today he was seen on the vent w/ minimal sedation to help him be comfortable w/ ET tube. R side is essentially non functional at this point. He was able to follow 1 step direction and nod appropriately, but unfortunately he wasn't able to tolerate the ET tube sitting EOB and had a coughing fit in which he was returned to supine. Will follow from a distance while on the vent, and increase frequency once extubated.    Plan    Recommend Occupational Therapy 3 times per week until therapy goals are met for the following treatments:  Adaptive Equipment, Cognitive Skill Development, Neuro Re-Education / Balance, Self Care/Activities of Daily Living, Therapeutic Activities and Therapeutic Exercises.    DC Equipment Recommendations: Unable to determine at this time  Discharge Recommendations: Recommend post-acute placement for additional occupational therapy services prior to discharge home        Objective       06/21/21 0901   Prior Living Situation   Prior Services Intermittent Physical Support for ADL Per Service  (IADLs)   Housing / Facility Group Home;1 Story House   Steps Into Home 0   Equipment Owned None   Lives with - Patient's Self Care Capacity Attendant / Paid Care Giver   Comments Pt able to provide most yes/no answers to questions. He is independent with BADLs however receives assistance w/ IADLs such as driving, shopping, cooking, cleaning, and med mgmt from  manager.   Prior Level of ADL Function   Self Feeding Independent   Grooming / Hygiene Independent   Bathing  Independent   Dressing Independent   Toileting Independent   Prior Level of IADL Function   Medication Management Requires Assist   Laundry Requires Assist   Kitchen Mobility Independent   Finances Requires Assist   Home Management Requires Assist   Shopping Requires Assist   Prior Level Of Mobility Independent Without Device in Community;Independent Without Device in Home   Driving / Transportation Relatives / Others Provide Transportation   Occupation (Pre-Hospital Vocational) Unable To Determine At This Time   History of Falls   History of Falls Yes   Precautions   Precautions Fall Risk;Swallow Precautions ( See Comments);Nasogastric Tube   Comments R hemiparesis; intubated   Pain 0 - 10 Group   Therapist Pain Assessment Nurse Notified;0   Cognition    Cognition / Consciousness X   Speech/ Communication Intubated / Trached;Nods Appropriately   Level of Consciousness Responds to voice   Ability To Follow Commands 1 Step   Attention Impaired   Active ROM Upper Body   Active ROM Upper Body  X   Dominant Hand Left   Flaccid Upper Extremity Right Upper Extremity Flaccid   Comments trace movement felt w/ shoulder flexion   Strength Upper Body   Upper Body Strength  X   Comments RUE flaccid   Upper Body Muscle Tone   Upper Body Muscle Tone  X   Rt Upper Extremity Muscle Tone Non Functional;Hypotonic   Balance Assessment   Sitting Balance (Static) Trace   Sitting Balance (Dynamic) Dependent   Weight Shift Sitting Absent   Comments posterior pushing, poor tolerance for ET tube sitting EOB   Bed Mobility    Supine to Sit Total Assist   Sit to Supine Total Assist   Scooting Total Assist   Rolling Moderate Assist to Rt.   Comments pt attempting to bridge in bed and help w/ bed mobility   ADL Assessment   Grooming Seated;Maximal Assist   Lower Body Dressing Total Assist  (socks)   How much help from another person does the patient currently need...   Putting on and taking off regular lower body clothing? 1   Bathing  (including washing, rinsing, and drying)? 1   Toileting, which includes using a toilet, bedpan, or urinal? 1   Putting on and taking off regular upper body clothing? 1   Taking care of personal grooming such as brushing teeth? 2   Eating meals? 1   6 Clicks Daily Activity Score 7   Modified [place] (mRS)   Modified [place] Score 5   Functional Mobility   Sit to Stand Unable to Participate   Mobility EOB Only   ICU Target Mobility Level   ICU Mobility - Targeted Level Level 2   Visual Perception   Visual Perception  X   Neglect Moderate Right    Comments able to look over to the right w/ max cueing   Patient / Family Goals   Patient / Family Goal #1 none stated   Short Term Goals   Short Term Goal # 1 pt will groom seated EOB w/ Blayne   Short Term Goal # 2 pt will identify 3/3 objects on the right w/o cues   Short Term Goal # 3 pt will demo stand pivot txf to BSC with modA

## 2021-06-21 NOTE — PROGRESS NOTES
2 RN skin check complete.   Devices in place finger probe, scd's, ekg leads, oxygen.  Skin assessed under devices yes.  Confirmed pressure ulcers found on none.  New potential pressure ulcers noted on none. Wound consult placed no.  Abrasions and scabs to patient face, nose, and lips.   Right black eye. Right hip bruise. Left great toe red and blanching. Left ear red and blanching.     The following interventions in place mepilex to bilateral elbow, heels, and sacrum.

## 2021-06-21 NOTE — CARE PLAN
Problem: Optimal Care of the Stroke Patient  Goal: Optimal emergency care for the stroke patient  Outcome: Progressing     Problem: Knowledge Deficit - Stroke Education  Goal: Patient's knowledge of stroke and risk factors will improve  Outcome: Progressing

## 2021-06-21 NOTE — CARE PLAN
The patient is Watcher - Medium risk of patient condition declining or worsening         Progress made toward(s) clinical / shift goals:        Problem: Optimal Care of the Stroke Patient  Goal: Optimal emergency care for the stroke patient  Outcome: Progressing  Goal: Optimal acute care for the stroke patient  Outcome: Progressing     Problem: Psychosocial - Patient Condition  Goal: Patient's ability to re-evaluate and adapt role responsibilities will improve  Outcome: Progressing     Problem: Discharge Planning - Stroke  Goal: Ensure Stroke Core Measures are met prior to discharge  Outcome: Progressing  Goal: Patient’s continuum of care needs will be met  Outcome: Progressing     Problem: Neuro Status  Goal: Neuro status will remain stable or improve  Outcome: Progressing     Problem: Hemodynamic Monitoring  Goal: Patient's hemodynamics, fluid balance and neurologic status will be stable or improve  Outcome: Progressing     Problem: Respiratory - Stroke Patient  Goal: Patient will achieve/maintain optimum respiratory rate/effort  Outcome: Progressing        Problem: Risk for Aspiration  Goal: Patient's risk for aspiration will be absent or decrease  Outcome: Progressing     Problem: Urinary Elimination  Goal: Establish and maintain regular urinary output  Outcome: Progressing     Problem: Bowel Elimination  Goal: Establish and maintain regular bowel function  Outcome: Progressing     Problem: Mobility - Stroke  Goal: Patient's capacity to carry out activities will improve  Outcome: Progressing  Goal: Spasticity will be prevented or improved  Outcome: Progressing  Goal: Subluxation will be prevented or improved  Outcome: Progressing     Problem: Self Care  Goal: Patient will have the ability to perform ADLs independently or with assistance (bathe, groom, dress, toilet and feed)  Outcome: Progressing     Problem: Knowledge Deficit - Standard  Goal: Patient and family/care givers will demonstrate understanding of  plan of care, disease process/condition, diagnostic tests and medications  Outcome: Progressing     Problem: Pain - Standard  Goal: Alleviation of pain or a reduction in pain to the patient’s comfort goal  Outcome: Progressing     Problem: Skin Integrity  Goal: Skin integrity is maintained or improved  Outcome: Progressing     Problem: Fall Risk  Goal: Patient will remain free from falls  Outcome: Progressing       Patient is not progressing towards the following goals:      Problem: Knowledge Deficit - Stroke Education  Goal: Patient's knowledge of stroke and risk factors will improve  Outcome: Not Progressing     Problem: Psychosocial - Patient Condition  Goal: Patient's ability to verbalize feelings about condition will improve  Outcome: Not Progressing

## 2021-06-21 NOTE — ASSESSMENT & PLAN NOTE
Intubated for inability to protect airway underwent one-way extubation on 6/25/2021   --Aspiration precautions and RT protocol   -- On 4-5 L     Need aggressive pulmonary toileting, RT following      Continue comfort care

## 2021-06-21 NOTE — CARE PLAN
Problem: Ventilation Defect:  Goal: Ability to achieve and maintain unassisted ventilation or tolerate decreased levels of ventilator support  6/21/2021 1647 by Krysten Maldonado RCP  Note:    Ventilator Daily Summary    Vent Day # 11    Ventilator settings changed this shift:Wean PEEP to 8, wean FiO2    Weaning trials: SBT x 2.     Respiratory Procedures: none, but patient to MRI    Plan: Continue current ventilator settings and wean mechanical ventilation as tolerated per physician orders.

## 2021-06-21 NOTE — CARE PLAN
Ventilator Daily Summary     Vent Day # 2      ETT 8.0 @ 25     Ventilator settings changed this shift:yes based on ABGs     APVcmv 16 440 10 50%     Respiratory Procedures:no     Plan: Continue current ventilator settings and wean mechanical ventilation as tolerated per physician orders.

## 2021-06-21 NOTE — PROGRESS NOTES
8:56 pm  Got notified by RN about patient having ventricular bigeminy and bradying down to 42.   Ordered stat EKG. Assessed at bedside. BP in /50s.   Stat BMP, Mg, trop ordered.   Per prior EKG review, he does have h/o ventricular bigeminy, SB, first degree block in past.     9:10 pm  EKG: SB, First degree block, new RBBB  Labs reviewed. Bicarb 23, K 4, Mg 1.7, Ca 8.6. TSH 2.18    -Stop bicarb gtt  - Stop Precedex. Precedex can cause a block and bradycardia.  - Start Propofol   -give IV Mg 2gm  -Follow up on trop  -Will closely monitor, on Precedex which could be contributing. Will consider epi gtt for deterioration.

## 2021-06-21 NOTE — PROGRESS NOTES
[facility] ICU Progress Note      Admit Date: 6/20/2021    Resident(s): Cruzito Hyman D.O.   Attending:  CHICA ROY/ Dr. Parmar    Patient ID:    Name:  Morgan Davis     YOB: 1950  Age:  70 y.o.  male   MRN:  [id number]    Hospital Course (carried forward and updated):  Morgan Davis is a 70 y.o. male with a PMH of schizoprhenia, hypothyroidism, hypertension, COPD, NIDDM who presented from a group home after falling out of his bed due to right-sided weakness. The patient was found to have a distal M1 throumbus in the left MCA. Not a TPA candidate. IR performed thrombectomy on admission. Patient was started on Unasyn intubated for airway protection and respiratory failure secondary to aspiration on 6/20/21.       Consultants:  Critical Care  Neurology   Interventional Radiology    Interval Events:  Intubated on the afternoon of 6/20/21 for airway protection.  Overnight, the patient was bradycardic with ventricular bigeminy. Bicarb drip and precedex were stopped. Bicarb this AM was normal at 23.   Breathing spontaneously this AM, but will hold off on extubation due to inability to protect airway/concern for aspiration.   Repeat CT Head showed evolving left frontotemporal infarct but no hemorrhage.Planning for possible MRI.  Stopped fibrate as the patient is now on high intensity statin. Continue to monitor triglycerides on propofol.   Restating patient's home Zyprexa    Vitals Range last 24h:  Temp:  [36.3 °C (97.3 °F)-37.8 °C (100 °F)] 37.8 °C (100 °F)  Pulse:  [] 41  Resp:  [14-49] 25  BP: ()/() 108/54  SpO2:  [78 %-100 %] 97 %      Intake/Output Summary (Last 24 hours) at 6/21/2021 0629  Last data filed at 6/21/2021 0600  Gross per 24 hour   Intake 4949.66 ml   Output 2470 ml   Net 2479.66 ml        ROS  Unable to obtain as the patient is intubated and sedated    PHYSICAL EXAM:  Vitals:    06/21/21 0530 06/21/21 0545 06/21/21 0600 06/21/21 0615   BP: 121/55 105/51 144/65 108/54    Pulse: (!) 58 (!) 43 (!) 46 (!) 41   Resp: (!) 27 (!) 25 (!) 27 (!) 25   Temp:       TempSrc:   Bladder    SpO2: 98% 97% 92% 97%   Weight:       Height:        Body mass index is 25.07 kg/m².    O2 therapy: Pulse Oximetry: 97 %, O2 (LPM): 0, O2 Delivery Device: Ventilator        Physical Exam  Vitals and nursing note reviewed.   Constitutional:       Appearance: He is ill-appearing and toxic-appearing.      Comments: Intubated   HENT:      Mouth/Throat:      Pharynx: Oropharyngeal exudate present.      Comments: Heavy secretions present  Eyes:      Extraocular Movements: Extraocular movements intact.      Pupils: Pupils are equal, round, and reactive to light.   Cardiovascular:      Rate and Rhythm: Regular rhythm. Bradycardia present.      Heart sounds: No murmur heard.   No friction rub. No gallop.    Pulmonary:      Breath sounds: Wheezing present.   Abdominal:      General: There is no distension.      Palpations: There is no mass.      Tenderness: There is no abdominal tenderness.      Hernia: No hernia is present.   Musculoskeletal:         General: No swelling, tenderness, deformity or signs of injury.      Cervical back: No rigidity or tenderness.   Skin:     Coloration: Skin is not jaundiced or pale.      Findings: No erythema.   Neurological:      Deep Tendon Reflexes: Reflexes abnormal.      Comments: Moves left upper and lower extremity spontaneously.  Withdraws right upper and lower extremity to pain.  Responds to simple verbal commands with thumbs up  Patellar refexes, 3+ bilterally         Recent Labs     06/20/21  1727 06/21/21  0123 06/21/21  0417   ISTATAPH 7.473 7.437 7.433   ISTATAPCO2 21.4* 37.3* 37.0   ISTATAPO2 96* 389* 248*   ISTATATCO2 16* 26 26   IPSYPIB3MZM 98 100* 100*   ISTATARTHCO3 15.7* 25.1* 24.7   ISTATARTBE -5* 1 1   ISTATTEMP 102.2 F 99.7 F 100.2 F   ISTATFIO2 100 80 60   ISTATSPEC Arterial Arterial Arterial   ISTATAPHTC 7.443 7.428 7.419   CPYPCLPT5XM 108* 392* 253*     Recent  Labs     06/20/21 0400 06/20/21 2110 06/21/21 0415   SODIUM 137 139 144   POTASSIUM 3.8 4.0 4.0   CHLORIDE 104 106 111   CO2 23 23 23   BUN 11 11 11   CREATININE 0.95 0.96 0.84   MAGNESIUM  --  1.7 2.3   PHOSPHORUS  --   --  2.5   CALCIUM 9.0 8.6 8.3*     Recent Labs     06/20/21 0400 06/20/21 2110 06/21/21 0415   ALTSGPT 17  --  15   ASTSGOT 19  --  21   ALKPHOSPHAT 71  --  62   TBILIRUBIN 0.6  --  0.6   PREALBUMIN  --   --  16.5*   GLUCOSE 131* 208* 141*     Recent Labs     06/20/21 0400 06/21/21 0415   RBC 5.18 4.74   HEMOGLOBIN 16.5 15.0   HEMATOCRIT 49.5 44.9   PLATELETCT 159* 188   PROTHROMBTM 13.2  --    APTT 23.5*  --    INR 1.03  --      Recent Labs     06/20/21 0400 06/21/21 0415   WBC 7.6 17.4*   NEUTSPOLYS 45.40 76.50*   LYMPHOCYTES 40.70 12.60*   MONOCYTES 8.70 10.20   EOSINOPHILS 4.60 0.00   BASOPHILS 0.10 0.10   ASTSGOT 19 21   ALTSGPT 17 15   ALKPHOSPHAT 71 62   TBILIRUBIN 0.6 0.6       Meds:  • labetalol  10 mg     • niCARdipine infusion  0-15 mg/hr Stopped (06/20/21 1600)   • hydrALAZINE  10-20 mg     • enalaprilat  1.25 mg     • LORazepam  1-2 mg     • insulin regular  1-6 Units      And   • dextrose 50%  50 mL     • Pharmacy  1 Each     • aspirin  81 mg     • atorvastatin  80 mg     • cyanocobalamin  1,000 mcg     • fenofibrate micronized  67 mg     • folic acid  1 mg     • levothyroxine  25 mcg     • lisinopril  2.5 mg     • senna-docusate  2 tablet      And   • polyethylene glycol/lytes  1 Packet      And   • magnesium hydroxide  30 mL      And   • bisacodyl  10 mg     • thiamine  100 mg     • fentaNYL   Stopped (06/20/21 1824)   • Respiratory Therapy Consult       • famotidine  20 mg      Or   • famotidine  20 mg     • MD Alert...Adult ICU Electrolyte Replacement per Pharmacy       • lidocaine  2 mL     • acetaminophen  650 mg     • ampicillin-sulbactam (UNASYN) IV  3 g Stopped (06/21/21 0600)   • sodium bicarbonate 150 meq in D5W 1000 mL   Stopped (06/20/21 2114)   • norepinephrine  (Levophed) infusion  0-30 mcg/min 8 mcg/min (06/21/21 0150)   • propofol  0-80 mcg/kg/min 30 mcg/kg/min (06/21/21 0600)        Procedures:  Central line  Endotracheal intubation    Imaging:  CT-HEAD W/O   Final Result      1.  Developing left frontotemporal infarct with associated cortically based low density      2.  Negative for hemorrhage      3.  Underlying cerebral volume loss and white matter change      DX-CHEST-PORTABLE (1 VIEW)   Final Result      No significant change      DX-CHEST-PORTABLE (1 VIEW)   Final Result      Radiographically satisfactory positioning of support hardware and no pneumothorax is seen      DX-ABDOMEN FOR TUBE PLACEMENT   Final Result      Feeding tube extends below the diaphragm with tip at the level of the proximal duodenum. No metallic instruments are identified.      CT-HEAD W/O   Final Result         NO ACUTE ABNORMALITIES ARE NOTED ON CT SCAN OF THE HEAD.      Findings are consistent with atrophy.  Decreased attenuation in the periventricular white matter likely indicates microvascular ischemic disease.      DX-CHEST-PORTABLE (1 VIEW)   Final Result      No acute cardiopulmonary findings.      CT-CTA HEAD WITH & W/O-POST PROCESS   Final Result      Thrombus in the distal M1 segment of the left middle cerebral artery. Reconstitution of the M2 middle cerebral artery branches.               Comment: Results discussed with Dr. Ren at approximately 4:35 AM      CT-CTA NECK WITH & W/O-POST PROCESSING   Final Result      Atherosclerotic disease. Resulting stenosis is less than 50%      CT-CSPINE WITHOUT PLUS RECONS   Final Result      No acute fracture is identified.      CT-CEREBRAL PERFUSION ANALYSIS   Final Result      1.  Cerebral blood flow less than 30% likely representing completed infarct = 60 mL.      2.  T Max more than 6 seconds likely representing combination of completed infarct and ischemia = 231 mL.      3.  Mismatched volume likely representing ischemic  brain/penumbra = 171 mL      4.  Please note that the cerebral perfusion was performed on the limited brain tissue around the basal ganglia region. Infarct/ischemia outside the CT perfusion sections can be missed in this study.      CT-HEAD W/O   Final Result      1.  No acute intracranial findings.      2.  Diffuse atrophy and periventricular white matter changes, consistent with chronic small vessel.               IR-THROMBO MECHANICAL ARTERY,INIT    (Results Pending)   EC-ECHOCARDIOGRAM COMPLETE W/O CONT    (Results Pending)   MR-BRAIN-W/O    (Results Pending)       ASSESSEMENT and PLAN:    * Acute ischemic stroke (HCC)- (present on admission)  Assessment & Plan  Patient [facility] from [facility] home with right sided weakness. Initial CT head without acute abnormality. CTA head was notable for a thrombus in the distal M1 segment of the left MCA. The patient was not a candidate for TPA given unknown last well time. Patient underwent Left MCA mechanical thrombectomy on 6/19. Repeat CT head shows evolving left frontotemporal infarct; Able to move to edge of bed with PT on 6/21/21  - MRI pending  - Neurochecks per protocol   - Goal SBP < 140, on Cardene drip  - MRI brain pending   - Echocardiogram   - Aspirin, high intensity statin  - Holding precedex for agitation due episode of ventricular bigeminy  - Neurology following, appreciate recommendations  - Speech evaluation     Ventricular bigeminy seen on cardiac monitor  Assessment & Plan  Ventricular bigeminy on night of 6/20/21  Was on bicarb drip that was discontinued  Precedex discontinued  Continue to monitor    Acute respiratory failure with hypoxia (HCC)- (present on admission)  Assessment & Plan  Intubated for respiratory failure and inability to protect airway in setting of likely aspiration  Patient has thick secretions and would be unlikely to protect his airway if extubated at this time  Continue mechanical ventilation for now for airway protection.    Type 2  diabetes mellitus, without long-term current use of insulin (HCC)- (present on admission)  Assessment & Plan  Hba1c 5.9% on admission on Metformin   - Sliding scale insulin   - Accuchecks, hypoglycemia protocol    Hyperlipidemia- (present on admission)  Assessment & Plan  - Follow up on Lipid panel   - Triglycerides essentially normal, DC fibrate  - started Atorvastatin     Hypertension- (present on admission)  Assessment & Plan  Home regimen: Lisinopril 2.5mg   - Currently elevated  - Nicardipine drip SBP < 140  - Hydralazine prn   - Labetalol prn  - Hold home lisinopril for now    Hypothyroidism- (present on admission)  Assessment & Plan  TSH normal at 2.18  Continue Synthroid    Schizophrenia (HCC)- (present on admission)  Assessment & Plan  History of. QTc 470ms  -Restart zyprexa  -Hold trazodone      DISPO: ICU, mechanically ventilated    CODE STATUS: FULL    Quality Measures:  Feeding: Tube Feeds  Analgesia: Fentanyl  Sedation: Propofol  Thromboprophylaxis: SCDs  Head of bed: >30 degrees  Ulcer prophylaxis: Pepcid  Bowel care: bowel regimen  Indwelling lines: PIV  Deescalation of antibiotics: Unasyn for potential aspiration      Cruzito Hyman D.O. PGY-3

## 2021-06-21 NOTE — THERAPY
Physical Therapy   Initial Evaluation     Patient Name: Morgan Davis  Age:  70 y.o., Sex:  male  Medical Record #: [id number]  Today's Date: 6/21/2021     Precautions: Swallow Precautions, Fall Risk (R sided deficits), SBP goal 100-140    Assessment  Patient is 70 y.o. male presenting acutely s/p GLF at his group home and subsequently found with R-sided weakness. Pt POD 1 L MCA thrombectomy, intubated post-op d/t inability to manage secretions. Pt following commands on L-side, appears to have adequate strength for mobility on L. No command following noted on R, clonus in R ankle. Pt with reflexive mvt only on R side when coughing EOB. Mobility limited by HTN that occurred after supine>sit with SBP elevating from 130s pre activity to 170s EOB however frequent coughing EOB. Anticipate improved participation in therapy once he is extubated. PT to follow.    Plan    Recommend Physical Therapy 3 times per week until therapy goals are met for the following treatments:  Bed Mobility, Neuro Re-Education / Balance, Therapeutic Activities and Therapeutic Exercises    DC Equipment Recommendations: Unable to determine at this time  Discharge Recommendations: Recommend post-acute placement for additional physical therapy services prior to discharge home     Objective     06/21/21 0832   Prior Living Situation   Prior Services Unable To Determine At This Time (OT reported pt received assist with IADLs)   Housing / Facility Group Home   Steps Into Home 0   Equipment Owned Unable to Determine At This Time   Lives with - Patient's Self Care Capacity Attendant / Paid Care Giver   Prior Level of Functional Mobility   Comments Unable to determine extent of PLOF, assume pt was ambulatory as pt admitted s/p GLF   Cognition    Speech/ Communication Intubated / Trached   Level of Consciousness Responds to voice   Ability To Follow Commands 1 Step (on L only)   Attention Impaired (strong L inattention)   Passive ROM Lower Body   Comments R  heelcord tightness -5 degrees of neutral   Active ROM Lower Body    Comments LLE WFL, RLE limited d/t lack of purposeful mvts   Strength Lower Body   Comments Unable to formally assess as pt not able to tolerate sitting position for formal MMT. Pt able to wiggle toes, DF/PF ankle, and flex/extend LLE. Reflexive mvt only in RLE when coughing   Lower Body Muscle Tone   Lower Body Muscle Tone  WDL, clonus R ankle +5 beats   Neurological Concerns   Comments posterior push with cough reflex, R sided weakness/inattention   Balance Assessment   Sitting Balance (Static) Trace   Sitting Balance (Dynamic) Dependent   Weight Shift Sitting Absent   Gait Analysis   Gait Level Of Assist Unable to Participate   Weight Bearing Status No restrictions   Bed Mobility    Supine to Sit Total Assist   Sit to Supine Total Assist   Functional Mobility   Sit to Stand Unable to Participate   Short Term Goals    Short Term Goal # 1 Pt will perform supine<>sit with mod A within 6 visits to improve participation in bed mob.   Short Term Goal # 2 Pt will perform bed<>chair transfer with mod A within 6 visits to increase OOB activity.

## 2021-06-22 ENCOUNTER — APPOINTMENT (OUTPATIENT)
Dept: RADIOLOGY | Facility: MEDICAL CENTER | Age: 71
DRG: 023 | End: 2021-06-22
Attending: STUDENT IN AN ORGANIZED HEALTH CARE EDUCATION/TRAINING PROGRAM
Payer: MEDICARE

## 2021-06-22 LAB
ANION GAP SERPL CALC-SCNC: 5 MMOL/L (ref 7–16)
BACTERIA UR CULT: NORMAL
BASOPHILS # BLD AUTO: 0.2 % (ref 0–1.8)
BASOPHILS # BLD: 0.02 K/UL (ref 0–0.12)
BUN SERPL-MCNC: 15 MG/DL (ref 8–22)
CALCIUM SERPL-MCNC: 9.4 MG/DL (ref 8.5–10.5)
CHLORIDE SERPL-SCNC: 110 MMOL/L (ref 96–112)
CO2 SERPL-SCNC: 28 MMOL/L (ref 20–33)
CREAT SERPL-MCNC: 0.85 MG/DL (ref 0.5–1.4)
EOSINOPHIL # BLD AUTO: 0.08 K/UL (ref 0–0.51)
EOSINOPHIL NFR BLD: 0.7 % (ref 0–6.9)
ERYTHROCYTE [DISTWIDTH] IN BLOOD BY AUTOMATED COUNT: 51.5 FL (ref 35.9–50)
GLUCOSE BLD-MCNC: 119 MG/DL (ref 65–99)
GLUCOSE BLD-MCNC: 120 MG/DL (ref 65–99)
GLUCOSE BLD-MCNC: 143 MG/DL (ref 65–99)
GLUCOSE SERPL-MCNC: 110 MG/DL (ref 65–99)
HCT VFR BLD AUTO: 43.4 % (ref 42–52)
HGB BLD-MCNC: 14 G/DL (ref 14–18)
IMM GRANULOCYTES # BLD AUTO: 0.04 K/UL (ref 0–0.11)
IMM GRANULOCYTES NFR BLD AUTO: 0.4 % (ref 0–0.9)
LYMPHOCYTES # BLD AUTO: 2.38 K/UL (ref 1–4.8)
LYMPHOCYTES NFR BLD: 21.8 % (ref 22–41)
MAGNESIUM SERPL-MCNC: 2.1 MG/DL (ref 1.5–2.5)
MCH RBC QN AUTO: 31.6 PG (ref 27–33)
MCHC RBC AUTO-ENTMCNC: 32.3 G/DL (ref 33.7–35.3)
MCV RBC AUTO: 98 FL (ref 81.4–97.8)
MONOCYTES # BLD AUTO: 0.98 K/UL (ref 0–0.85)
MONOCYTES NFR BLD AUTO: 9 % (ref 0–13.4)
NEUTROPHILS # BLD AUTO: 7.43 K/UL (ref 1.82–7.42)
NEUTROPHILS NFR BLD: 67.9 % (ref 44–72)
NRBC # BLD AUTO: 0 K/UL
NRBC BLD-RTO: 0 /100 WBC
PHOSPHATE SERPL-MCNC: 2.2 MG/DL (ref 2.5–4.5)
PLATELET # BLD AUTO: 131 K/UL (ref 164–446)
PMV BLD AUTO: 10.5 FL (ref 9–12.9)
POTASSIUM SERPL-SCNC: 4 MMOL/L (ref 3.6–5.5)
RBC # BLD AUTO: 4.43 M/UL (ref 4.7–6.1)
SIGNIFICANT IND 70042: NORMAL
SITE SITE: NORMAL
SODIUM SERPL-SCNC: 143 MMOL/L (ref 135–145)
SOURCE SOURCE: NORMAL
TRIGL SERPL-MCNC: 139 MG/DL (ref 0–149)
WBC # BLD AUTO: 10.9 K/UL (ref 4.8–10.8)

## 2021-06-22 PROCEDURE — 85025 COMPLETE CBC W/AUTO DIFF WBC: CPT

## 2021-06-22 PROCEDURE — 700105 HCHG RX REV CODE 258: Performed by: EMERGENCY MEDICINE

## 2021-06-22 PROCEDURE — A9270 NON-COVERED ITEM OR SERVICE: HCPCS | Performed by: PSYCHIATRY & NEUROLOGY

## 2021-06-22 PROCEDURE — 700105 HCHG RX REV CODE 258: Performed by: PSYCHIATRY & NEUROLOGY

## 2021-06-22 PROCEDURE — 700101 HCHG RX REV CODE 250: Performed by: EMERGENCY MEDICINE

## 2021-06-22 PROCEDURE — 83735 ASSAY OF MAGNESIUM: CPT

## 2021-06-22 PROCEDURE — 700111 HCHG RX REV CODE 636 W/ 250 OVERRIDE (IP): Performed by: STUDENT IN AN ORGANIZED HEALTH CARE EDUCATION/TRAINING PROGRAM

## 2021-06-22 PROCEDURE — 84100 ASSAY OF PHOSPHORUS: CPT

## 2021-06-22 PROCEDURE — 700102 HCHG RX REV CODE 250 W/ 637 OVERRIDE(OP): Performed by: INTERNAL MEDICINE

## 2021-06-22 PROCEDURE — 700102 HCHG RX REV CODE 250 W/ 637 OVERRIDE(OP): Performed by: STUDENT IN AN ORGANIZED HEALTH CARE EDUCATION/TRAINING PROGRAM

## 2021-06-22 PROCEDURE — 700111 HCHG RX REV CODE 636 W/ 250 OVERRIDE (IP): Performed by: INTERNAL MEDICINE

## 2021-06-22 PROCEDURE — 94003 VENT MGMT INPAT SUBQ DAY: CPT

## 2021-06-22 PROCEDURE — 99232 SBSQ HOSP IP/OBS MODERATE 35: CPT | Performed by: PSYCHIATRY & NEUROLOGY

## 2021-06-22 PROCEDURE — A9270 NON-COVERED ITEM OR SERVICE: HCPCS | Performed by: INTERNAL MEDICINE

## 2021-06-22 PROCEDURE — 71045 X-RAY EXAM CHEST 1 VIEW: CPT

## 2021-06-22 PROCEDURE — 700102 HCHG RX REV CODE 250 W/ 637 OVERRIDE(OP): Performed by: PSYCHIATRY & NEUROLOGY

## 2021-06-22 PROCEDURE — 700111 HCHG RX REV CODE 636 W/ 250 OVERRIDE (IP): Performed by: PSYCHIATRY & NEUROLOGY

## 2021-06-22 PROCEDURE — 99291 CRITICAL CARE FIRST HOUR: CPT | Mod: GC | Performed by: EMERGENCY MEDICINE

## 2021-06-22 PROCEDURE — 82962 GLUCOSE BLOOD TEST: CPT | Mod: 91

## 2021-06-22 PROCEDURE — A9270 NON-COVERED ITEM OR SERVICE: HCPCS | Performed by: STUDENT IN AN ORGANIZED HEALTH CARE EDUCATION/TRAINING PROGRAM

## 2021-06-22 PROCEDURE — 84478 ASSAY OF TRIGLYCERIDES: CPT

## 2021-06-22 PROCEDURE — 770022 HCHG ROOM/CARE - ICU (200)

## 2021-06-22 PROCEDURE — 94799 UNLISTED PULMONARY SVC/PX: CPT

## 2021-06-22 PROCEDURE — 80048 BASIC METABOLIC PNL TOTAL CA: CPT

## 2021-06-22 RX ADMIN — CYANOCOBALAMIN TAB 500 MCG 1000 MCG: 500 TAB at 05:52

## 2021-06-22 RX ADMIN — SODIUM CHLORIDE 3 G: 900 INJECTION INTRAVENOUS at 05:52

## 2021-06-22 RX ADMIN — PROPOFOL 30 MCG/KG/MIN: 10 INJECTION, EMULSION INTRAVENOUS at 22:38

## 2021-06-22 RX ADMIN — PROPOFOL 30 MCG/KG/MIN: 10 INJECTION, EMULSION INTRAVENOUS at 05:15

## 2021-06-22 RX ADMIN — ENOXAPARIN SODIUM 40 MG: 40 INJECTION SUBCUTANEOUS at 09:47

## 2021-06-22 RX ADMIN — THIAMINE HCL TAB 100 MG 100 MG: 100 TAB at 19:55

## 2021-06-22 RX ADMIN — ACETAMINOPHEN 650 MG: 325 TABLET, FILM COATED ORAL at 19:55

## 2021-06-22 RX ADMIN — ASPIRIN 81 MG: 81 TABLET, CHEWABLE ORAL at 05:52

## 2021-06-22 RX ADMIN — HYDRALAZINE HYDROCHLORIDE 20 MG: 20 INJECTION INTRAMUSCULAR; INTRAVENOUS at 17:18

## 2021-06-22 RX ADMIN — LEVOTHYROXINE SODIUM 25 MCG: 0.03 TABLET ORAL at 05:52

## 2021-06-22 RX ADMIN — DOCUSATE SODIUM 50 MG AND SENNOSIDES 8.6 MG 2 TABLET: 8.6; 5 TABLET, FILM COATED ORAL at 05:52

## 2021-06-22 RX ADMIN — FOLIC ACID 1 MG: 1 TABLET ORAL at 05:52

## 2021-06-22 RX ADMIN — SODIUM PHOSPHATE, MONOBASIC, MONOHYDRATE AND SODIUM PHOSPHATE, DIBASIC, ANHYDROUS 15 MMOL: 276; 142 INJECTION, SOLUTION INTRAVENOUS at 09:47

## 2021-06-22 RX ADMIN — OLANZAPINE 10 MG: 5 TABLET, FILM COATED ORAL at 17:18

## 2021-06-22 RX ADMIN — FAMOTIDINE 20 MG: 20 TABLET ORAL at 17:17

## 2021-06-22 RX ADMIN — ACETAMINOPHEN 650 MG: 325 TABLET, FILM COATED ORAL at 14:24

## 2021-06-22 RX ADMIN — ATORVASTATIN CALCIUM 80 MG: 80 TABLET, FILM COATED ORAL at 17:18

## 2021-06-22 RX ADMIN — FAMOTIDINE 20 MG: 20 TABLET ORAL at 05:52

## 2021-06-22 RX ADMIN — HYDRALAZINE HYDROCHLORIDE 20 MG: 20 INJECTION INTRAMUSCULAR; INTRAVENOUS at 11:02

## 2021-06-22 ASSESSMENT — PAIN DESCRIPTION - PAIN TYPE
TYPE: ACUTE PAIN

## 2021-06-22 ASSESSMENT — FIBROSIS 4 INDEX: FIB4 SCORE: 2.05

## 2021-06-22 NOTE — CARE PLAN
Ventilator Daily Summary    Vent Day #3    Ventilator settings changed this shift: No changes    Weaning trials: SBTx2, not following for parameters    Respiratory Procedures: none    Plan: Continue current ventilator settings and wean mechanical ventilation as tolerated per physician orders.

## 2021-06-22 NOTE — FLOWSHEET NOTE
06/22/21 0736   Events/Summary/Plan   Events/Summary/Plan Pt placed back on CMV did not follow for parameters   Spontaneous Breathing Trial (SBT)   Spontaneous breathing trial (SBT) outcome Pt weaned for 1 hour and returned to rest settings per protocol - SBT Pass   Length of Weaning Trial (Hours) 4   Pt passed SBT but not ready to extubate Not ready - continue daily assessments for extubation   Weaning Parameters   RR (bpm) 20   $ FVC / Vital Capacity (liters)    (not following)   NIF (cm H2O)    (not following)   Rapid Shallow Breathing Index (RR/VT) 62   Spontaneous VE 6.6   Spontaneous

## 2021-06-22 NOTE — CARE PLAN
Problem: Neuro Status  Goal: Neuro status will remain stable or improve  Outcome: Progressing     Problem: Hemodynamic Monitoring  Goal: Patient's hemodynamics, fluid balance and neurologic status will be stable or improve  Outcome: Progressing

## 2021-06-22 NOTE — PROGRESS NOTES
[facility] Progress Note      Admit Date: 6/20/2021    Resident(s): Cruzito Hyman D.O.   Attending:  CHICA ROY/ Dr. Elam    Patient ID:    Name:  Morgan Davis     YOB: 1950  Age:  70 y.o.  male   MRN:  [id number]    Hospital Course (carried forward and updated):  Morgan Davis is a 70 y.o. male with a PMH of schizoprhenia, hypothyroidism, hypertension, COPD, NIDDM who presented from a group home after falling out of his bed due to right-sided weakness. The patient was found to have a distal M1 throumbus in the left MCA. Not a TPA candidate. IR performed thrombectomy on admission. Patient was started on Unasyn intubated for airway protection and respiratory failure secondary to aspiration on 6/20/21.     Consultants:  Critical Care  Neurology   Interventional Radiology    Interval Events:  No acute overnight events.  Breathing spontaneously this AM  Discontinuing Unasyn as patient's white count has improved and no further evidence of aspiration.  MRI with left frontal parietal infarct; Left posterior, temporal, and occipital lobe infarcts also present consistent with left MCA territory.  ECHO with EF of 60%, RVSP 27  Goal BP <140 systolic  Continue PT/OT    Attempted to reach patient's family to arrange a bedside conversation for CODE status and goals of care, but the patient's next of kin, Jefferson Davis recently had a stroke and has difficulty speaking and making decisions. This is per conversation with Milagros Davis, Jefferson Davis's wife. Discussed with care coordination. Appreciate assistance from care coordination in performing next of kin search.      Vitals Range last 24h:  Pulse:  [41-69] 50  Resp:  [16-42] 20  BP: ()/(51-72) 151/65  SpO2:  [93 %-100 %] 98 %      Intake/Output Summary (Last 24 hours) at 6/22/2021 1118  Last data filed at 6/22/2021 1000  Gross per 24 hour   Intake 2562.52 ml   Output 1955 ml   Net 607.52 ml        ROS  Unable to obtain as the patient is intubated  and sedated    PHYSICAL EXAM:  Vitals:    06/22/21 0900 06/22/21 0927 06/22/21 1000 06/22/21 1030   BP: 106/51  (!) 174/72 151/65   Pulse: (!) 54 60 (!) 53 (!) 50   Resp: (!) 29 (!) 21 (!) 21 20   Temp:       TempSrc:   Bladder    SpO2: 99% 99% 98% 98%   Weight:       Height:        Body mass index is 25.13 kg/m².    O2 therapy: Pulse Oximetry: 98 %, O2 Delivery Device: Ventilator    Date 06/22/21 0700 - 06/23/21 0659   Shift 6955-7482 7084-6842 7821-3894 24 Hour Total   INTAKE   I.V. 5.8   5.8     Propofol Volume 5.8   5.8   NG/   260     Intake (mL) (Enteral Tube 06/20/21 Cortrak - Small Bowel/Transpyloric Right nare) 260   260   IV Piggyback 250   250     Volume (mL) (sodium phosphate 15 mmol in dextrose 5% 250 mL ivpb) 250   250   Shift Total 515.8   515.8   OUTPUT   Urine 350   350     Output (mL) (Urethral Catheter Temperature probe) 350   350   Shift Total 350   350   .8   165.8       Physical Exam  Vitals and nursing note reviewed.   Constitutional:       Appearance: He is ill-appearing and toxic-appearing.      Comments: Intubated   HENT:      Mouth/Throat:      Pharynx: Oropharyngeal exudate present.      Comments: Heavy secretions present  Eyes:      Extraocular Movements: Extraocular movements intact.      Pupils: Pupils are equal, round, and reactive to light.   Cardiovascular:      Rate and Rhythm: Regular rhythm. Bradycardia present.      Heart sounds: No murmur heard.   No friction rub. No gallop.    Pulmonary:      Breath sounds: Wheezing present.   Abdominal:      General: There is no distension.      Palpations: There is no mass.      Tenderness: There is no abdominal tenderness.      Hernia: No hernia is present.   Musculoskeletal:         General: No swelling, tenderness, deformity or signs of injury.      Cervical back: No rigidity or tenderness.   Skin:     Coloration: Skin is not jaundiced or pale.      Findings: No erythema.   Neurological:      Deep Tendon Reflexes: Reflexes  abnormal.      Comments: Moves both left upper and lower extremities spontaneously.  Withdraws right upper and lower extremities to pain.  Responds to simple verbal commands with a thumbs up  Patellar refexes, 3+ bilterally         Recent Labs     06/20/21  1727 06/21/21  0123 06/21/21 0417   ISTATAPH 7.473 7.437 7.433   ISTATAPCO2 21.4* 37.3* 37.0   ISTATAPO2 96* 389* 248*   ISTATATCO2 16* 26 26   [id number] 98 100* 100*   ISTATARTHCO3 15.7* 25.1* 24.7   ISTATARTBE -5* 1 1   ISTATTEMP 102.2 F 99.7 F 100.2 F   ISTATFIO2 100 80 60   ISTATSPEC Arterial Arterial Arterial   ISTATAPHTC 7.443 7.428 7.419   [id number] 108* 392* 253*     Recent Labs     06/20/21 2110 06/21/21 0415 06/22/21 0415   SODIUM 139 144 143   POTASSIUM 4.0 4.0 4.0   CHLORIDE 106 111 110   CO2 23 23 28   BUN 11 11 15   CREATININE 0.96 0.84 0.85   MAGNESIUM 1.7 2.3 2.1   PHOSPHORUS  --  2.5 2.2*   CALCIUM 8.6 8.3* 9.4     Recent Labs     06/20/21  0400 06/20/21  0400 06/20/21 2110 06/21/21 0415 06/22/21 0415   ALTSGPT 17  --   --  15  --    ASTSGOT 19  --   --  21  --    ALKPHOSPHAT 71  --   --  62  --    TBILIRUBIN 0.6  --   --  0.6  --    PREALBUMIN  --   --   --  16.5*  --    GLUCOSE 131*   < > 208* 141* 110*    < > = values in this interval not displayed.     Recent Labs     06/20/21 0400 06/21/21 0415 06/22/21 0415   RBC 5.18 4.74 4.43*   HEMOGLOBIN 16.5 15.0 14.0   HEMATOCRIT 49.5 44.9 43.4   PLATELETCT 159* 188 131*   PROTHROMBTM 13.2  --   --    APTT 23.5*  --   --    INR 1.03  --   --      Recent Labs     06/20/21  0400 06/21/21  0415 06/22/21  0415   WBC 7.6 17.4* 10.9*   NEUTSPOLYS 45.40 76.50* 67.90   LYMPHOCYTES 40.70 12.60* 21.80*   MONOCYTES 8.70 10.20 9.00   EOSINOPHILS 4.60 0.00 0.70   BASOPHILS 0.10 0.10 0.20   ASTSGOT 19 21  --    ALTSGPT 17 15  --    ALKPHOSPHAT 71 62  --    TBILIRUBIN 0.6 0.6  --        Meds:  • sodium phosphate ivpb  15 mmol 15 mmol (06/22/21 0947)   • enoxaparin (LOVENOX) injection  40 mg     •  OLANZapine  10 mg     • labetalol  10 mg     • niCARdipine infusion  0-15 mg/hr Stopped (06/20/21 1600)   • hydrALAZINE  10-20 mg     • enalaprilat  1.25 mg     • LORazepam  1-2 mg     • insulin regular  1-6 Units      And   • dextrose 50%  50 mL     • Pharmacy  1 Each     • aspirin  81 mg     • atorvastatin  80 mg     • cyanocobalamin  1,000 mcg     • folic acid  1 mg     • levothyroxine  25 mcg     • senna-docusate  2 tablet      And   • polyethylene glycol/lytes  1 Packet      And   • magnesium hydroxide  30 mL      And   • bisacodyl  10 mg     • thiamine  100 mg     • fentaNYL   Stopped (06/20/21 1824)   • Respiratory Therapy Consult       • famotidine  20 mg     • MD Alert...Adult ICU Electrolyte Replacement per Pharmacy       • lidocaine  2 mL     • acetaminophen  650 mg     • norepinephrine (Levophed) infusion  0-30 mcg/min Stopped (06/21/21 0905)   • propofol  0-80 mcg/kg/min 5 mcg/kg/min (06/22/21 0915)        Procedures:  Central line  Endotracheal intubation    Imaging:  DX-CHEST-PORTABLE (1 VIEW)   Final Result      Stable chest with hyperinflation and aortic ectasia      MR-BRAIN-W/O   Final Result      1.  Moderate to large area of ischemia in the LEFT frontal and parietal lobes involving the basal ganglia with scattered areas of ischemia in the LEFT posterior temporal and occipital lobes, MCA territory. The area of ischemia is much smaller than the    area of tissue at risks seen on the perfusion CT.   2.  No acute hemorrhage   3.  Small areas of encephalomalacia in the RIGHT frontal cortex and BILATERAL cerebellum   4.  Atrophy   5.  White matter changes      EC-ECHOCARDIOGRAM COMPLETE W/O CONT   Final Result      CT-HEAD W/O   Final Result      1.  Developing left frontotemporal infarct with associated cortically based low density      2.  Negative for hemorrhage      3.  Underlying cerebral volume loss and white matter change      DX-CHEST-PORTABLE (1 VIEW)   Final Result      No significant change       DX-CHEST-PORTABLE (1 VIEW)   Final Result      Radiographically satisfactory positioning of support hardware and no pneumothorax is seen      DX-ABDOMEN FOR TUBE PLACEMENT   Final Result      Feeding tube extends below the diaphragm with tip at the level of the proximal duodenum. No metallic instruments are identified.      CT-HEAD W/O   Final Result         NO ACUTE ABNORMALITIES ARE NOTED ON CT SCAN OF THE HEAD.      Findings are consistent with atrophy.  Decreased attenuation in the periventricular white matter likely indicates microvascular ischemic disease.      IR-THROMBO MECHANICAL ARTERY,INIT   Final Result         70-year-old patient who presented with acute onset of right-sided upper and lower extremity weakness and slurred speech was found to have a left M1 occlusion. Patient underwent emergent left MCA thrombectomy with complete restoration of flow to the left    MCA as described above. Minimal distal embolization to a distal parietal M4 cortical branches noted.      Final recanalization score: TICI 2C.               DX-CHEST-PORTABLE (1 VIEW)   Final Result      No acute cardiopulmonary findings.      CT-CTA HEAD WITH & W/O-POST PROCESS   Final Result      Thrombus in the distal M1 segment of the left middle cerebral artery. Reconstitution of the M2 middle cerebral artery branches.               Comment: Results discussed with Dr. Ren at approximately 4:35 AM      CT-CTA NECK WITH & W/O-POST PROCESSING   Final Result      Atherosclerotic disease. Resulting stenosis is less than 50%      CT-CSPINE WITHOUT PLUS RECONS   Final Result      No acute fracture is identified.      CT-CEREBRAL PERFUSION ANALYSIS   Final Result      1.  Cerebral blood flow less than 30% likely representing completed infarct = 60 mL.      2.  T Max more than 6 seconds likely representing combination of completed infarct and ischemia = 231 mL.      3.  Mismatched volume likely representing ischemic brain/penumbra = 171 mL       4.  Please note that the cerebral perfusion was performed on the limited brain tissue around the basal ganglia region. Infarct/ischemia outside the CT perfusion sections can be missed in this study.      CT-HEAD W/O   Final Result      1.  No acute intracranial findings.      2.  Diffuse atrophy and periventricular white matter changes, consistent with chronic small vessel.                   ASSESSEMENT and PLAN:    * Acute ischemic stroke (HCC)- (present on admission)  Assessment & Plan  Patient presented from group home with right sided weakness. Initial CT head without acute abnormality. CTA head was notable for a thrombus in the distal M1 segment of the left MCA. The patient was not a candidate for TPA given unknown last well time. Patient underwent Left MCA mechanical thrombectomy on 6/19. Repeat CT head shows evolving left frontotemporal infarct; Able to move to edge of bed with PT on 6/21/21  MRI pending shows left frontal and parietal lobe ischemia. ECHO with EF of 60%, RVSP 27  Plan:  - Neurochecks per protocol   - Goal SBP < 140, on Cardene drip  - Aspirin, high intensity statin  -Start DVT prophylaxis  - Precedex held due to episode of ventricular bigeminy  - Neurology following, appreciate recommendations  - Speech evaluation when appropriate  -Advanced care planning    Ventricular bigeminy seen on cardiac monitor  Assessment & Plan  Ventricular bigeminy on night of 6/20/21  Was on bicarb drip that was discontinued  Precedex discontinued  Continue to monitor    Acute respiratory failure with hypoxia (HCC)- (present on admission)  Assessment & Plan  Was intubated for respiratory failure and inability to protect airway in setting of likely aspiration  Discontinuing Unasyn as patient is afebrile with no leukocytosis and no infiltrates on X-ray    Type 2 diabetes mellitus, without long-term current use of insulin (HCC)- (present on admission)  Assessment & Plan  Hba1c 5.9% on admission on Metformin   -  Sliding scale insulin   - Accuchecks, hypoglycemia protocol    Hyperlipidemia- (present on admission)  Assessment & Plan  - Follow up on Lipid panel   - Triglycerides essentially normal, DC fibrate  - started Atorvastatin     Hypertension- (present on admission)  Assessment & Plan  Home regimen: Lisinopril 2.5mg   - Currently elevated  - Nicardipine drip SBP < 140  - Hydralazine prn   - Labetalol prn  - Hold home lisinopril for now    Hypothyroidism- (present on admission)  Assessment & Plan  TSH normal at 2.18  Continue Synthroid    Schizophrenia (HCC)- (present on admission)  Assessment & Plan  History of. QTc 470ms  -Restart zyprexa  -Hold trazodone      DISPO: ICU, s/p mechanical ventilation    CODE STATUS: FULL    Quality Measures:  Feeding: Tube Feeds  Analgesia: Fentanyl  Sedation: Propofol  Thromboprophylaxis: SCDs  Head of bed: >30 degrees  Ulcer prophylaxis: Pepcid  Bowel care: bowel regimen  Indwelling lines: PIV  Deescalation of antibiotics: Unasyn for potential aspiration      Cruzito Hyman D.O. PGY-3

## 2021-06-22 NOTE — DISCHARGE PLANNING
"This RN CM received update from Dr. Hyman that he attempted to call Pt's brother- Jefferson Davis but he recently had a stroke and is unable to assist with medical decisions. Dr. Hyman spoke to Pt's sister-in-law, Milagros, who is able to assist with medical decisions if needed.     BRISA MADDEN called Milagros at [phone]. Milagros confirmed above information and stated that Jefferson is \"aware of what's going on but cannot communicate\" due to his stroke. Milagros states she and Jefferson have two children (pt's nieces). Milagros states she grew up with the Pt and has been  to Jefferson for 57 years. Milagros provided her daughters' contact info, which was added to emergency contact list. Milagros updated that since her daughters (Glenn) are blood relatives of the pt, they would be next in line as legal next of kin. Milagros voices understanding and states she will be updating Jazmyne and Lazara of Pt's current medical condition. Milagros also states she will try to discuss with Pt's brother, Jefferson.    BRISA MADDEN confirmed with Dima Khan, Bioethics Director, that Pt's next of kin (after brother) would be his nieces. Cassie states Milagros can receive medical updates as well, but medical decisions should be made by Pt's nieces.     RN CM updated bedside RN and Dr. Hyman of above.   "

## 2021-06-22 NOTE — CARE PLAN
The patient is Watcher - Medium risk of patient condition declining or worsening    Progress made toward(s) clinical / shift goals:      Problem: Optimal Care of the Stroke Patient  Goal: Optimal acute care for the stroke patient  Outcome: Progressing     Problem: Knowledge Deficit - Stroke Education  Goal: Patient's knowledge of stroke and risk factors will improve  Outcome: Progressing     Problem: Discharge Planning - Stroke  Goal: Patient’s continuum of care needs will be met  Outcome: Progressing     Problem: Neuro Status  Goal: Neuro status will remain stable or improve  Outcome: Progressing     Problem: Urinary Elimination  Goal: Establish and maintain regular urinary output  Outcome: Progressing     Problem: Bowel Elimination  Goal: Establish and maintain regular bowel function  Outcome: Progressing     Problem: Self Care  Goal: Patient will have the ability to perform ADLs independently or with assistance (bathe, groom, dress, toilet and feed)  Outcome: Progressing     Problem: Pain - Standard  Goal: Alleviation of pain or a reduction in pain to the patient’s comfort goal  Outcome: Progressing     Problem: Skin Integrity  Goal: Skin integrity is maintained or improved  Outcome: Progressing     Problem: Fall Risk  Goal: Patient will remain free from falls  Outcome: Progressing

## 2021-06-22 NOTE — PROGRESS NOTES
Hospital Neurology Progress Note:     Interval History: No acute events overnight. Unable to obtain review of systems due to intubation.    Objective:   Vitals:    06/22/21 0614 06/22/21 0630 06/22/21 0700 06/22/21 0736   BP:  117/56 142/65    Pulse: (!) 52 (!) 53 (!) 49 (!) 53   Resp: (!) 24 (!) 22 20 18   Temp:       TempSrc:       SpO2: 99% 99% 95% 97%   Weight:       Height:           Labs:     Lab Results   Component Value Date/Time    PROTHROMBTM 13.2 06/20/2021 04:00 AM    INR 1.03 06/20/2021 04:00 AM      Lab Results   Component Value Date/Time    WBC 10.9 (H) 06/22/2021 04:15 AM    RBC 4.43 (L) 06/22/2021 04:15 AM    HEMOGLOBIN 14.0 06/22/2021 04:15 AM    HEMATOCRIT 43.4 06/22/2021 04:15 AM    MCV 98.0 (H) 06/22/2021 04:15 AM    MCH 31.6 06/22/2021 04:15 AM    MCHC 32.3 (L) 06/22/2021 04:15 AM    MPV 10.5 06/22/2021 04:15 AM    NEUTSPOLYS 67.90 06/22/2021 04:15 AM    LYMPHOCYTES 21.80 (L) 06/22/2021 04:15 AM    MONOCYTES 9.00 06/22/2021 04:15 AM    EOSINOPHILS 0.70 06/22/2021 04:15 AM    BASOPHILS 0.20 06/22/2021 04:15 AM      Lab Results   Component Value Date/Time    SODIUM 143 06/22/2021 04:15 AM    POTASSIUM 4.0 06/22/2021 04:15 AM    CHLORIDE 110 06/22/2021 04:15 AM    CO2 28 06/22/2021 04:15 AM    GLUCOSE 110 (H) 06/22/2021 04:15 AM    BUN 15 06/22/2021 04:15 AM    CREATININE 0.85 06/22/2021 04:15 AM      Lab Results   Component Value Date/Time    CHOLSTRLTOT 148 06/21/2021 04:15 AM    LDL 85 06/21/2021 04:15 AM    HDL 41 06/21/2021 04:15 AM    TRIGLYCERIDE 139 06/22/2021 04:15 AM       Lab Results   Component Value Date/Time    ALKPHOSPHAT 62 06/21/2021 04:15 AM    ASTSGOT 21 06/21/2021 04:15 AM    ALTSGPT 15 06/21/2021 04:15 AM    TBILIRUBIN 0.6 06/21/2021 04:15 AM        Imaging/Testing:   MRI brain without contrast on 6/21/2021 personally reviewed with evidence of a moderate sized infarction in the left MCA territory however some infarcts were seen in the occipital lobe proper.    CTA of the head  and neck reviewed in chart    TTE reviewed in chart.    Physical Exam:     General: 70-year-old male intubated and sedated no acute distress.  Cardio: Normal S1/S2. No peripheral edema.   Pulm: CTAX2. No respiratory distress.   Skin: Warm, dry, no rashes or lesions   Psychiatric: Unable to assess.  HEENT: Atraumatic head, normal sclera and conjunctiva, moist oral mucosa. No lid lag.  Abdomen: Soft, non tender. No masses or hepatosplenomegaly.    Neurologic:  Mental Status: Intubated and sedated. Unable to follow commands. No speech or language to evaluate.  Cranial Nerves: Pupils equally round reactive to light. Oculocephalic reflex is absent. Face appears to be symmetric.  Motor: Normal muscle tone and bulk. Withdraws to noxious stimulus on the left upper and lower extremity as well as the right lower extremity.  Reflexes: Deferred  Coordination: Unable to assess  Sensation: Withdraws from noxious stimulus in the right lower, left lower and left upper extremity.  Gait/Station: Deferred/unable to assess    Assessment/Plan:    Morgan Davis is a 70 y.o. male with history of hypertension, hyperlipidemia presenting to the hospital for stroke and consulted for str. Patient had a left M1 occlusion on initial presentation. He was not a TPA candidate due to an unknown last known well. He received a thrombectomy with TICI3 flow. Unfortunately, he sustained a moderate left hemispheric infarction. Etiology is likely cardioembolic versus atheroembolic. Patient will need long-term cardiac monitoring. Unfortunately, he required intubation likely due to aspiration and is currently undergoing treatment for pneumonia.    Plan:  1. MRI brain without evidence of hemorrhage. Continue aspirin.  2. Aim for normotension  3. Management of ventilation and aspiration event per ICU  4. Hemoglobin A1c 5.9. At goal.  5. LDL 85. Goal less than 70  6. Will need long-term cardiac monitor at time of discharge  7. PT/OT/speech therapy as needed  8.  Okay for DVT prophylaxis  9. Stroke [facility] follow-up.  10. Plan discussed with consulting physician and patient's nurse.  11. Neurology to follow as needed. Please call with any further questions or concerns.    Chris Monroy M.D., Diplomat of the American Board of Psychiatry and Neurology  Diplomat of [facility]   Assistant Clinical Professor, [facility] Neurology Consultant

## 2021-06-22 NOTE — CARE PLAN
Problem: Nutritional:  Goal: Nutrition support tolerated and meeting greater than 85% of estimated needs  Outcome: Met    Diabetisource AC is running at 65 ml/hr which is goal.

## 2021-06-22 NOTE — DISCHARGE PLANNING
[facility] Transitional [facility] Coordination    Follow up for rehab.  Current documentation does not demonstrate tolerance/participation for IRF level therapy regimen.  Intubated.  PMR consult pended.  TCC following.   Please reach out sooner if PMR consult requested for medical management.

## 2021-06-23 LAB
ANION GAP SERPL CALC-SCNC: 7 MMOL/L (ref 7–16)
BASOPHILS # BLD AUTO: 0.2 % (ref 0–1.8)
BASOPHILS # BLD: 0.02 K/UL (ref 0–0.12)
BUN SERPL-MCNC: 20 MG/DL (ref 8–22)
CALCIUM SERPL-MCNC: 9.3 MG/DL (ref 8.5–10.5)
CHLORIDE SERPL-SCNC: 110 MMOL/L (ref 96–112)
CO2 SERPL-SCNC: 27 MMOL/L (ref 20–33)
CREAT SERPL-MCNC: 0.86 MG/DL (ref 0.5–1.4)
EOSINOPHIL # BLD AUTO: 0.15 K/UL (ref 0–0.51)
EOSINOPHIL NFR BLD: 1.4 % (ref 0–6.9)
ERYTHROCYTE [DISTWIDTH] IN BLOOD BY AUTOMATED COUNT: 50.9 FL (ref 35.9–50)
GLUCOSE BLD-MCNC: 128 MG/DL (ref 65–99)
GLUCOSE BLD-MCNC: 130 MG/DL (ref 65–99)
GLUCOSE BLD-MCNC: 134 MG/DL (ref 65–99)
GLUCOSE BLD-MCNC: 136 MG/DL (ref 65–99)
GLUCOSE BLD-MCNC: 148 MG/DL (ref 65–99)
GLUCOSE SERPL-MCNC: 124 MG/DL (ref 65–99)
HCT VFR BLD AUTO: 43.4 % (ref 42–52)
HGB BLD-MCNC: 14.1 G/DL (ref 14–18)
IMM GRANULOCYTES # BLD AUTO: 0.04 K/UL (ref 0–0.11)
IMM GRANULOCYTES NFR BLD AUTO: 0.4 % (ref 0–0.9)
LYMPHOCYTES # BLD AUTO: 1.8 K/UL (ref 1–4.8)
LYMPHOCYTES NFR BLD: 16.4 % (ref 22–41)
MAGNESIUM SERPL-MCNC: 2.1 MG/DL (ref 1.5–2.5)
MCH RBC QN AUTO: 31.8 PG (ref 27–33)
MCHC RBC AUTO-ENTMCNC: 32.5 G/DL (ref 33.7–35.3)
MCV RBC AUTO: 97.7 FL (ref 81.4–97.8)
MONOCYTES # BLD AUTO: 1.16 K/UL (ref 0–0.85)
MONOCYTES NFR BLD AUTO: 10.6 % (ref 0–13.4)
NEUTROPHILS # BLD AUTO: 7.78 K/UL (ref 1.82–7.42)
NEUTROPHILS NFR BLD: 71 % (ref 44–72)
NRBC # BLD AUTO: 0 K/UL
NRBC BLD-RTO: 0 /100 WBC
PHOSPHATE SERPL-MCNC: 3.3 MG/DL (ref 2.5–4.5)
PLATELET # BLD AUTO: 134 K/UL (ref 164–446)
PMV BLD AUTO: 10.2 FL (ref 9–12.9)
POTASSIUM SERPL-SCNC: 4 MMOL/L (ref 3.6–5.5)
RBC # BLD AUTO: 4.44 M/UL (ref 4.7–6.1)
SODIUM SERPL-SCNC: 144 MMOL/L (ref 135–145)
WBC # BLD AUTO: 11 K/UL (ref 4.8–10.8)

## 2021-06-23 PROCEDURE — 94640 AIRWAY INHALATION TREATMENT: CPT

## 2021-06-23 PROCEDURE — 700102 HCHG RX REV CODE 250 W/ 637 OVERRIDE(OP): Performed by: PSYCHIATRY & NEUROLOGY

## 2021-06-23 PROCEDURE — A9270 NON-COVERED ITEM OR SERVICE: HCPCS | Performed by: PSYCHIATRY & NEUROLOGY

## 2021-06-23 PROCEDURE — 700111 HCHG RX REV CODE 636 W/ 250 OVERRIDE (IP): Performed by: STUDENT IN AN ORGANIZED HEALTH CARE EDUCATION/TRAINING PROGRAM

## 2021-06-23 PROCEDURE — 700111 HCHG RX REV CODE 636 W/ 250 OVERRIDE (IP): Performed by: EMERGENCY MEDICINE

## 2021-06-23 PROCEDURE — 80048 BASIC METABOLIC PNL TOTAL CA: CPT

## 2021-06-23 PROCEDURE — A9270 NON-COVERED ITEM OR SERVICE: HCPCS | Performed by: STUDENT IN AN ORGANIZED HEALTH CARE EDUCATION/TRAINING PROGRAM

## 2021-06-23 PROCEDURE — 94799 UNLISTED PULMONARY SVC/PX: CPT

## 2021-06-23 PROCEDURE — 83735 ASSAY OF MAGNESIUM: CPT

## 2021-06-23 PROCEDURE — A9270 NON-COVERED ITEM OR SERVICE: HCPCS | Performed by: INTERNAL MEDICINE

## 2021-06-23 PROCEDURE — 700102 HCHG RX REV CODE 250 W/ 637 OVERRIDE(OP): Performed by: INTERNAL MEDICINE

## 2021-06-23 PROCEDURE — 84100 ASSAY OF PHOSPHORUS: CPT

## 2021-06-23 PROCEDURE — 94003 VENT MGMT INPAT SUBQ DAY: CPT

## 2021-06-23 PROCEDURE — 700102 HCHG RX REV CODE 250 W/ 637 OVERRIDE(OP): Performed by: STUDENT IN AN ORGANIZED HEALTH CARE EDUCATION/TRAINING PROGRAM

## 2021-06-23 PROCEDURE — 700111 HCHG RX REV CODE 636 W/ 250 OVERRIDE (IP): Performed by: INTERNAL MEDICINE

## 2021-06-23 PROCEDURE — 82962 GLUCOSE BLOOD TEST: CPT | Mod: 91

## 2021-06-23 PROCEDURE — 99291 CRITICAL CARE FIRST HOUR: CPT | Mod: GC | Performed by: EMERGENCY MEDICINE

## 2021-06-23 PROCEDURE — 85025 COMPLETE CBC W/AUTO DIFF WBC: CPT

## 2021-06-23 PROCEDURE — 770022 HCHG ROOM/CARE - ICU (200)

## 2021-06-23 RX ORDER — ACETAMINOPHEN 325 MG/1
650 TABLET ORAL EVERY 4 HOURS PRN
Status: DISCONTINUED | OUTPATIENT
Start: 2021-06-23 | End: 2021-06-28

## 2021-06-23 RX ORDER — LABETALOL HYDROCHLORIDE 5 MG/ML
10 INJECTION, SOLUTION INTRAVENOUS
Status: DISCONTINUED | OUTPATIENT
Start: 2021-06-23 | End: 2021-06-28

## 2021-06-23 RX ORDER — ENALAPRILAT 1.25 MG/ML
1.25 INJECTION INTRAVENOUS EVERY 6 HOURS PRN
Status: DISCONTINUED | OUTPATIENT
Start: 2021-06-23 | End: 2021-06-28

## 2021-06-23 RX ADMIN — SODIUM BICARBONATE 3 ML: 84 INJECTION, SOLUTION INTRAVENOUS at 22:45

## 2021-06-23 RX ADMIN — PROPOFOL 20 MCG/KG/MIN: 10 INJECTION, EMULSION INTRAVENOUS at 05:06

## 2021-06-23 RX ADMIN — THIAMINE HCL TAB 100 MG 100 MG: 100 TAB at 20:40

## 2021-06-23 RX ADMIN — SODIUM BICARBONATE 3 ML: 84 INJECTION, SOLUTION INTRAVENOUS at 19:22

## 2021-06-23 RX ADMIN — ASPIRIN 81 MG: 81 TABLET, CHEWABLE ORAL at 05:05

## 2021-06-23 RX ADMIN — ACETAMINOPHEN 650 MG: 325 TABLET, FILM COATED ORAL at 12:11

## 2021-06-23 RX ADMIN — ENOXAPARIN SODIUM 40 MG: 40 INJECTION SUBCUTANEOUS at 05:05

## 2021-06-23 RX ADMIN — OLANZAPINE 10 MG: 5 TABLET, FILM COATED ORAL at 17:21

## 2021-06-23 RX ADMIN — DOCUSATE SODIUM 50 MG AND SENNOSIDES 8.6 MG 2 TABLET: 8.6; 5 TABLET, FILM COATED ORAL at 17:21

## 2021-06-23 RX ADMIN — FAMOTIDINE 20 MG: 20 TABLET ORAL at 17:21

## 2021-06-23 RX ADMIN — ACETAMINOPHEN 650 MG: 325 TABLET, FILM COATED ORAL at 02:44

## 2021-06-23 RX ADMIN — FAMOTIDINE 20 MG: 20 TABLET ORAL at 05:05

## 2021-06-23 RX ADMIN — LEVOTHYROXINE SODIUM 25 MCG: 0.03 TABLET ORAL at 05:05

## 2021-06-23 RX ADMIN — FOLIC ACID 1 MG: 1 TABLET ORAL at 05:05

## 2021-06-23 RX ADMIN — CYANOCOBALAMIN TAB 500 MCG 1000 MCG: 500 TAB at 05:05

## 2021-06-23 RX ADMIN — ATORVASTATIN CALCIUM 80 MG: 80 TABLET, FILM COATED ORAL at 17:21

## 2021-06-23 RX ADMIN — HYDRALAZINE HYDROCHLORIDE 10 MG: 20 INJECTION INTRAMUSCULAR; INTRAVENOUS at 06:08

## 2021-06-23 ASSESSMENT — PAIN DESCRIPTION - PAIN TYPE
TYPE: ACUTE PAIN

## 2021-06-23 NOTE — CARE PLAN
Ventilator Daily Summary     Vent Day # 4     ETT 8.0 @ 25     Ventilator settings changed this shift:no     APVcmv 16 440 8 30%     Respiratory Procedures:no     Plan: Continue current ventilator settings and wean mechanical ventilation as tolerated per physician orders.

## 2021-06-23 NOTE — ASSESSMENT & PLAN NOTE
Was intubated for respiratory failure and inability to protect airway in setting of likely aspiration  Discontinued Unasyn as patient is afebrile with no leukocytosis and no infiltrates on X-ray  ABCDEF Bundle  One way extubation today, discussed with family yesterday and agreeable

## 2021-06-23 NOTE — PROGRESS NOTES
[facility] Progress Note      Admit Date: 6/20/2021    Resident(s): Nikhil Collado M.D.   Attending:  CHICA ROY/ Dr. Elam    Patient ID:    Name:  Morgan Davis     YOB: 1950  Age:  70 y.o.  male   MRN:  [id number]    Hospital Course (carried forward and updated):  Morgan Davis is a 70 y.o. male with a PMH of schizoprhenia, hypothyroidism, hypertension, COPD, NIDDM who presented from a group home after falling out of his bed due to right-sided weakness. The patient was found to have a distal M1 throumbus in the left MCA. Not a TPA candidate. IR performed thrombectomy on admission. Patient was started on Unasyn intubated for airway protection and respiratory failure secondary to aspiration on 6/20/21.   6/22/20: Unasyn discontinued , having copious secretions from vent    Consultants:  Critical Care  Neurology   Interventional Radiology    Interval Events:  No acute overnight events. Continues responding to simple commands.  Unable to tolerate longer SBT due to secretions  BP within goals    Spoke at length with patient's niece Jazmyne in [place], per CM and palliative she has decision-making capacity since his closest relative his brother (Jazmyne's father) is recovering from stroke in [place].  Discussed his current course of treatment so far with the thrombectomy, findings of extensive left MCA stroke and what that can mean recovery wise has the left hemispheres responsible for comprehension and speech. She stated that with his schizophrenia he had difficulties interacting with anyone other than his brother and was generally not in the best of health. Discussed what Morgan's wishes would have been as far as resuscitation or further aggressive treatment, and she said her father spoken with Morgan about this and were in agreement that should his condition decline to the point of cardiac arrest he would not want cardiac resuscitation. CODE STATUS changed to DNR, I okay to reflect this  fact.  Also she maintained that in these conversations that Morgan would not want prolonged mechanical ventilation conversations with his brother.  I stated we would keep her updated and if we feel that he will not be able to be extubated we can discuss comfort oriented measures at that point.      Vitals Range last 24h:  Pulse:  [56-99] 66  Resp:  [12-39] 22  BP: ()/(52-79) 116/54  SpO2:  [91 %-99 %] 93 %      Intake/Output Summary (Last 24 hours) at 6/23/2021 1130  Last data filed at 6/23/2021 1000  Gross per 24 hour   Intake 3332.75 ml   Output 2935 ml   Net 397.75 ml        Review of Systems   Unable to perform ROS: Intubated         PHYSICAL EXAM:  Vitals:    06/23/21 0800 06/23/21 0900 06/23/21 1000 06/23/21 1002   BP: 146/74 121/58 116/54    Pulse: 99 70 61 66   Resp: (!) 31 19 19 (!) 22   Temp:       TempSrc: Bladder  Bladder    SpO2: 91% 94% 92% 93%   Weight:       Height:        Body mass index is 25.13 kg/m².    O2 therapy: Pulse Oximetry: 93 %, O2 Delivery Device: Ventilator    Date 06/23/21 0700 - 06/24/21 0659   Shift 0264-2983 2288-7218 2732-6536 24 Hour Total   INTAKE   I.V. 8.8   8.8     Propofol Volume 8.8   8.8   NG/   260     Intake (mL) (Enteral Tube 06/20/21 Cortrak - Small Bowel/Transpyloric Right nare) 260   260   Shift Total 268.8   268.8   OUTPUT   Urine 630   630     Output (mL) (Urethral Catheter Temperature probe) 630   630   Shift Total 630   630   NET -361.2   -361.2       Physical Exam  Vitals and nursing note reviewed.   Constitutional:       Appearance: He is ill-appearing and toxic-appearing.      Comments: Intubated   HENT:      Mouth/Throat:      Pharynx: Oropharyngeal exudate present.      Comments: Heavy secretions present  Eyes:      Extraocular Movements: Extraocular movements intact.      Pupils: Pupils are equal, round, and reactive to light.   Cardiovascular:      Rate and Rhythm: Regular rhythm. Bradycardia present.      Heart sounds: No murmur heard.   No  friction rub. No gallop.    Pulmonary:      Breath sounds: Wheezing present.   Abdominal:      General: There is no distension.      Palpations: There is no mass.      Tenderness: There is no abdominal tenderness.      Hernia: No hernia is present.   Musculoskeletal:         General: No swelling, tenderness, deformity or signs of injury.      Cervical back: No rigidity or tenderness.   Skin:     Coloration: Skin is not jaundiced or pale.      Findings: No erythema.   Neurological:      Deep Tendon Reflexes: Reflexes abnormal.      Comments: Moves both left upper and lower extremities spontaneously.  Withdraws right upper and lower extremities to pain.  Responds to simple verbal commands with a thumbs up, can open eyes and track  Patellar refexes, 3+ bilterally         Recent Labs     06/20/21  1727 06/21/21  0123 06/21/21  0417   ISTATAPH 7.473 7.437 7.433   ISTATAPCO2 21.4* 37.3* 37.0   ISTATAPO2 96* 389* 248*   ISTATATCO2 16* 26 26   XWUHDGU7CVK 98 100* 100*   ISTATARTHCO3 15.7* 25.1* 24.7   ISTATARTBE -5* 1 1   ISTATTEMP 102.2 F 99.7 F 100.2 F   ISTATFIO2 100 80 60   ISTATSPEC Arterial Arterial Arterial   ISTATAPHTC 7.443 7.428 7.419   MDARYONQ3JB 108* 392* 253*     Recent Labs     06/21/21 0415 06/22/21 0415 06/23/21  0400   SODIUM 144 143 144   POTASSIUM 4.0 4.0 4.0   CHLORIDE 111 110 110   CO2 23 28 27   BUN 11 15 20   CREATININE 0.84 0.85 0.86   MAGNESIUM 2.3 2.1 2.1   PHOSPHORUS 2.5 2.2* 3.3   CALCIUM 8.3* 9.4 9.3     Recent Labs     06/21/21  0415 06/22/21  0415 06/23/21  0400   ALTSGPT 15  --   --    ASTSGOT 21  --   --    ALKPHOSPHAT 62  --   --    TBILIRUBIN 0.6  --   --    PREALBUMIN 16.5*  --   --    GLUCOSE 141* 110* 124*     Recent Labs     06/21/21 0415 06/22/21  0415 06/23/21  0400   RBC 4.74 4.43* 4.44*   HEMOGLOBIN 15.0 14.0 14.1   HEMATOCRIT 44.9 43.4 43.4   PLATELETCT 188 131* 134*     Recent Labs     06/21/21  0415 06/22/21  0415 06/23/21  0400   WBC 17.4* 10.9* 11.0*   NEUTSPOLYS 76.50*  67.90 71.00   LYMPHOCYTES 12.60* 21.80* 16.40*   MONOCYTES 10.20 9.00 10.60   EOSINOPHILS 0.00 0.70 1.40   BASOPHILS 0.10 0.20 0.20   ASTSGOT 21  --   --    ALTSGPT 15  --   --    ALKPHOSPHAT 62  --   --    TBILIRUBIN 0.6  --   --        Meds:  • sodium bicarbonate  2-3 mL     • enalaprilat  1.25 mg     • labetalol  10 mg     • enoxaparin (LOVENOX) injection  40 mg     • OLANZapine  10 mg     • hydrALAZINE  10-20 mg     • LORazepam  1-2 mg     • insulin regular  1-6 Units      And   • dextrose 50%  50 mL     • Pharmacy  1 Each     • aspirin  81 mg     • atorvastatin  80 mg     • cyanocobalamin  1,000 mcg     • folic acid  1 mg     • levothyroxine  25 mcg     • senna-docusate  2 tablet      And   • polyethylene glycol/lytes  1 Packet      And   • magnesium hydroxide  30 mL      And   • bisacodyl  10 mg     • thiamine  100 mg     • fentaNYL   Stopped (06/20/21 1824)   • Respiratory Therapy Consult       • famotidine  20 mg     • MD Alert...Adult ICU Electrolyte Replacement per Pharmacy       • lidocaine  2 mL     • acetaminophen  650 mg     • propofol  0-80 mcg/kg/min 10 mcg/kg/min (06/23/21 0805)        Procedures:  Central line  Endotracheal intubation    Imaging:  DX-CHEST-PORTABLE (1 VIEW)   Final Result      Stable chest with hyperinflation and aortic ectasia      MR-BRAIN-W/O   Final Result      1.  Moderate to large area of ischemia in the LEFT frontal and parietal lobes involving the basal ganglia with scattered areas of ischemia in the LEFT posterior temporal and occipital lobes, MCA territory. The area of ischemia is much smaller than the    area of tissue at risks seen on the perfusion CT.   2.  No acute hemorrhage   3.  Small areas of encephalomalacia in the RIGHT frontal cortex and BILATERAL cerebellum   4.  Atrophy   5.  White matter changes      EC-ECHOCARDIOGRAM COMPLETE W/O CONT   Final Result      CT-HEAD W/O   Final Result      1.  Developing left frontotemporal infarct with associated cortically  based low density      2.  Negative for hemorrhage      3.  Underlying cerebral volume loss and white matter change      DX-CHEST-PORTABLE (1 VIEW)   Final Result      No significant change      DX-CHEST-PORTABLE (1 VIEW)   Final Result      Radiographically satisfactory positioning of support hardware and no pneumothorax is seen      DX-ABDOMEN FOR TUBE PLACEMENT   Final Result      Feeding tube extends below the diaphragm with tip at the level of the proximal duodenum. No metallic instruments are identified.      CT-HEAD W/O   Final Result         NO ACUTE ABNORMALITIES ARE NOTED ON CT SCAN OF THE HEAD.      Findings are consistent with atrophy.  Decreased attenuation in the periventricular white matter likely indicates microvascular ischemic disease.      IR-THROMBO MECHANICAL ARTERY,INIT   Final Result         70-year-old patient who presented with acute onset of right-sided upper and lower extremity weakness and slurred speech was found to have a left M1 occlusion. Patient underwent emergent left MCA thrombectomy with complete restoration of flow to the left    MCA as described above. Minimal distal embolization to a distal parietal M4 cortical branches noted.      Final recanalization score: TICI 2C.               DX-CHEST-PORTABLE (1 VIEW)   Final Result      No acute cardiopulmonary findings.      CT-CTA HEAD WITH & W/O-POST PROCESS   Final Result      Thrombus in the distal M1 segment of the left middle cerebral artery. Reconstitution of the M2 middle cerebral artery branches.               Comment: Results discussed with Dr. Ren at approximately 4:35 AM      CT-CTA NECK WITH & W/O-POST PROCESSING   Final Result      Atherosclerotic disease. Resulting stenosis is less than 50%      CT-CSPINE WITHOUT PLUS RECONS   Final Result      No acute fracture is identified.      CT-CEREBRAL PERFUSION ANALYSIS   Final Result      1.  Cerebral blood flow less than 30% likely representing completed infarct = 60 mL.       2.  T Max more than 6 seconds likely representing combination of completed infarct and ischemia = 231 mL.      3.  Mismatched volume likely representing ischemic brain/penumbra = 171 mL      4.  Please note that the cerebral perfusion was performed on the limited brain tissue around the basal ganglia region. Infarct/ischemia outside the CT perfusion sections can be missed in this study.      CT-HEAD W/O   Final Result      1.  No acute intracranial findings.      2.  Diffuse atrophy and periventricular white matter changes, consistent with chronic small vessel.                   ASSESSEMENT and PLAN:    * Acute ischemic stroke (HCC)- (present on admission)  Assessment & Plan  Patient presented from group home with right sided weakness. Initial CT head without acute abnormality. CTA head was notable for a thrombus in the distal M1 segment of the left MCA. The patient was not a candidate for TPA given unknown last well time. Patient underwent Left MCA mechanical thrombectomy on 6/19. Repeat CT head shows evolving left frontotemporal infarct; Able to move to edge of bed with PT on 6/21/21  MRI with left frontal parietal infarct; Left posterior, temporal, and occipital lobe infarcts also present consistent with left MCA territory.. ECHO with EF of 60%, RVSP 27    Plan:  - Neurochecks per protocol   - Goal SBP < 140, on Cardene drip  - Aspirin, high intensity statin  -lovenox 40mg for DVT prophylaxis  - Precedex held due to episode of ventricular bigeminy  - Neurology signed off  - Speech evaluation/PT/OT as able  -Advanced care planning, pending [facility] conversation with family      Acute respiratory failure with hypoxia (HCC)- (present on admission)  Assessment & Plan  Was intubated for respiratory failure and inability to protect airway in setting of likely aspiration  Discontinued Unasyn as patient is afebrile with no leukocytosis and no infiltrates on X-ray  ABCDEF Bundle  Vent per RT, SAT/SBT when able      Type 2  diabetes mellitus, without long-term current use of insulin (HCC)- (present on admission)  Assessment & Plan  A1c 5.9  Has only required 1 time insulin this admission     Hyperlipidemia- (present on admission)  Assessment & Plan  Lipid panel shows LDL 85  Continue statin  Fibrate discontinued,     Hypertension- (present on admission)  Assessment & Plan  Home regimen: Lisinopril 2.5mg   Goal of normotension  - Hydralazine prn for SBP>160  - Labetalol prn for SBP>160  - Hold home lisinopril for now      Hypothyroidism- (present on admission)  Assessment & Plan  TSH normal at 2.18  Continue Synthroid      Schizophrenia (HCC)- (present on admission)  Assessment & Plan  History of. QTc 470ms  -Restart zyprexa  -Hold trazodone        DISPO: ICU for mechanical ventilation    CODE STATUS: FULL    Quality Measures:  Feeding: Tube Feeds at goal diabetisource  Analgesia: Fentanyl  Sedation: Propofol  Thromboprophylaxis: lovenox, SCDs  Head of bed: >30 degrees  Ulcer prophylaxis: Pepcid  Bowel care: bowel regimen  Indwelling lines: PIV  Deescalation of antibiotics: Unasyn 6/20-6/22      Nikhil Collado M.D.

## 2021-06-23 NOTE — ASSESSMENT & PLAN NOTE
Patient presented from group home with right sided weakness. Initial CT head without acute abnormality. CTA head was notable for a thrombus in the distal M1 segment of the left MCA. The patient was not a candidate for TPA given unknown last well time. Patient underwent Left MCA mechanical thrombectomy on 6/19. Repeat CT head shows evolving left frontotemporal infarct; Able to move to edge of bed with PT on 6/21/21  MRI with left frontal parietal infarct; Left posterior, temporal, and occipital lobe infarcts also present consistent with left MCA territory.. ECHO with EF of 60%, RVSP 27    Plan:  - Neurochecks per protocol   - Goal SBP < 140  - Aspirin, high intensity statin  -lovenox 40mg for DVT prophylaxis  - Precedex held due to episode of ventricular bigeminy  - Neurology signed off  - Speech evaluation/PT/OT as able  -Advanced care planning, made DNR, continuing GOC conversation with family,agreed with one way extubation.

## 2021-06-23 NOTE — ASSESSMENT & PLAN NOTE
Home regimen: Lisinopril 2.5mg   Goal of normotension  - Hydralazine prn for SBP>160  - Labetalol prn for SBP>160  - Hold home lisinopril for now     ambulatory

## 2021-06-23 NOTE — CARE PLAN
Problem: Respiratory - Stroke Patient  Goal: Patient will achieve/maintain optimum respiratory rate/effort  Outcome: Progressing     Problem: Urinary Elimination  Goal: Establish and maintain regular urinary output  Outcome: Progressing

## 2021-06-23 NOTE — CARE PLAN
Ventilator Daily Summary    Vent Day #4    Ventilator settings changed this shift:none    Weaning trials:SBTx2, not following    Respiratory Procedures:Bicarb initiated or tenacious secretions    Plan: Continue current ventilator settings and wean mechanical ventilation as tolerated per physician orders.

## 2021-06-24 LAB
ANION GAP SERPL CALC-SCNC: 7 MMOL/L (ref 7–16)
BASOPHILS # BLD AUTO: 0.2 % (ref 0–1.8)
BASOPHILS # BLD: 0.02 K/UL (ref 0–0.12)
BUN SERPL-MCNC: 23 MG/DL (ref 8–22)
CALCIUM SERPL-MCNC: 9.6 MG/DL (ref 8.5–10.5)
CHLORIDE SERPL-SCNC: 109 MMOL/L (ref 96–112)
CO2 SERPL-SCNC: 25 MMOL/L (ref 20–33)
CREAT SERPL-MCNC: 0.82 MG/DL (ref 0.5–1.4)
EOSINOPHIL # BLD AUTO: 0.2 K/UL (ref 0–0.51)
EOSINOPHIL NFR BLD: 2 % (ref 0–6.9)
ERYTHROCYTE [DISTWIDTH] IN BLOOD BY AUTOMATED COUNT: 50.2 FL (ref 35.9–50)
GLUCOSE BLD-MCNC: 137 MG/DL (ref 65–99)
GLUCOSE BLD-MCNC: 141 MG/DL (ref 65–99)
GLUCOSE BLD-MCNC: 162 MG/DL (ref 65–99)
GLUCOSE SERPL-MCNC: 133 MG/DL (ref 65–99)
HCT VFR BLD AUTO: 45.5 % (ref 42–52)
HGB BLD-MCNC: 14.8 G/DL (ref 14–18)
IMM GRANULOCYTES # BLD AUTO: 0.05 K/UL (ref 0–0.11)
IMM GRANULOCYTES NFR BLD AUTO: 0.5 % (ref 0–0.9)
LYMPHOCYTES # BLD AUTO: 1.87 K/UL (ref 1–4.8)
LYMPHOCYTES NFR BLD: 18.3 % (ref 22–41)
MCH RBC QN AUTO: 31.8 PG (ref 27–33)
MCHC RBC AUTO-ENTMCNC: 32.5 G/DL (ref 33.7–35.3)
MCV RBC AUTO: 97.8 FL (ref 81.4–97.8)
MONOCYTES # BLD AUTO: 1 K/UL (ref 0–0.85)
MONOCYTES NFR BLD AUTO: 9.8 % (ref 0–13.4)
NEUTROPHILS # BLD AUTO: 7.06 K/UL (ref 1.82–7.42)
NEUTROPHILS NFR BLD: 69.2 % (ref 44–72)
NRBC # BLD AUTO: 0 K/UL
NRBC BLD-RTO: 0 /100 WBC
PLATELET # BLD AUTO: 134 K/UL (ref 164–446)
PMV BLD AUTO: 10.2 FL (ref 9–12.9)
POTASSIUM SERPL-SCNC: 4.4 MMOL/L (ref 3.6–5.5)
RBC # BLD AUTO: 4.65 M/UL (ref 4.7–6.1)
SODIUM SERPL-SCNC: 141 MMOL/L (ref 135–145)
WBC # BLD AUTO: 10.2 K/UL (ref 4.8–10.8)

## 2021-06-24 PROCEDURE — A9270 NON-COVERED ITEM OR SERVICE: HCPCS | Performed by: PSYCHIATRY & NEUROLOGY

## 2021-06-24 PROCEDURE — 700111 HCHG RX REV CODE 636 W/ 250 OVERRIDE (IP): Performed by: INTERNAL MEDICINE

## 2021-06-24 PROCEDURE — 94150 VITAL CAPACITY TEST: CPT

## 2021-06-24 PROCEDURE — 94799 UNLISTED PULMONARY SVC/PX: CPT

## 2021-06-24 PROCEDURE — 700102 HCHG RX REV CODE 250 W/ 637 OVERRIDE(OP): Performed by: STUDENT IN AN ORGANIZED HEALTH CARE EDUCATION/TRAINING PROGRAM

## 2021-06-24 PROCEDURE — 700102 HCHG RX REV CODE 250 W/ 637 OVERRIDE(OP): Performed by: INTERNAL MEDICINE

## 2021-06-24 PROCEDURE — 770022 HCHG ROOM/CARE - ICU (200)

## 2021-06-24 PROCEDURE — 700111 HCHG RX REV CODE 636 W/ 250 OVERRIDE (IP): Performed by: STUDENT IN AN ORGANIZED HEALTH CARE EDUCATION/TRAINING PROGRAM

## 2021-06-24 PROCEDURE — 94640 AIRWAY INHALATION TREATMENT: CPT

## 2021-06-24 PROCEDURE — A9270 NON-COVERED ITEM OR SERVICE: HCPCS | Performed by: INTERNAL MEDICINE

## 2021-06-24 PROCEDURE — 94003 VENT MGMT INPAT SUBQ DAY: CPT

## 2021-06-24 PROCEDURE — A9270 NON-COVERED ITEM OR SERVICE: HCPCS | Performed by: STUDENT IN AN ORGANIZED HEALTH CARE EDUCATION/TRAINING PROGRAM

## 2021-06-24 PROCEDURE — 82962 GLUCOSE BLOOD TEST: CPT

## 2021-06-24 PROCEDURE — 80048 BASIC METABOLIC PNL TOTAL CA: CPT

## 2021-06-24 PROCEDURE — 700102 HCHG RX REV CODE 250 W/ 637 OVERRIDE(OP): Performed by: PSYCHIATRY & NEUROLOGY

## 2021-06-24 PROCEDURE — 99291 CRITICAL CARE FIRST HOUR: CPT | Mod: GC | Performed by: EMERGENCY MEDICINE

## 2021-06-24 PROCEDURE — 85025 COMPLETE CBC W/AUTO DIFF WBC: CPT

## 2021-06-24 RX ORDER — HYDROMORPHONE HYDROCHLORIDE 1 MG/ML
0.5 INJECTION, SOLUTION INTRAMUSCULAR; INTRAVENOUS; SUBCUTANEOUS EVERY 4 HOURS PRN
Status: DISCONTINUED | OUTPATIENT
Start: 2021-06-24 | End: 2021-06-26

## 2021-06-24 RX ADMIN — CYANOCOBALAMIN TAB 500 MCG 1000 MCG: 500 TAB at 05:43

## 2021-06-24 RX ADMIN — SODIUM BICARBONATE 3 ML: 84 INJECTION, SOLUTION INTRAVENOUS at 15:29

## 2021-06-24 RX ADMIN — SODIUM BICARBONATE 2 ML: 84 INJECTION, SOLUTION INTRAVENOUS at 23:00

## 2021-06-24 RX ADMIN — PROPOFOL 10 MCG/KG/MIN: 10 INJECTION, EMULSION INTRAVENOUS at 22:10

## 2021-06-24 RX ADMIN — ACETAMINOPHEN 650 MG: 325 TABLET, FILM COATED ORAL at 20:47

## 2021-06-24 RX ADMIN — OLANZAPINE 10 MG: 5 TABLET, FILM COATED ORAL at 17:39

## 2021-06-24 RX ADMIN — LEVOTHYROXINE SODIUM 25 MCG: 0.03 TABLET ORAL at 05:43

## 2021-06-24 RX ADMIN — SODIUM BICARBONATE 3 ML: 84 INJECTION, SOLUTION INTRAVENOUS at 06:41

## 2021-06-24 RX ADMIN — PROPOFOL 10 MCG/KG/MIN: 10 INJECTION, EMULSION INTRAVENOUS at 04:51

## 2021-06-24 RX ADMIN — FAMOTIDINE 20 MG: 20 TABLET ORAL at 17:39

## 2021-06-24 RX ADMIN — ENOXAPARIN SODIUM 40 MG: 40 INJECTION SUBCUTANEOUS at 05:42

## 2021-06-24 RX ADMIN — DOCUSATE SODIUM 50 MG AND SENNOSIDES 8.6 MG 2 TABLET: 8.6; 5 TABLET, FILM COATED ORAL at 17:39

## 2021-06-24 RX ADMIN — ASPIRIN 81 MG: 81 TABLET, CHEWABLE ORAL at 05:43

## 2021-06-24 RX ADMIN — SODIUM BICARBONATE 2 ML: 84 INJECTION, SOLUTION INTRAVENOUS at 19:49

## 2021-06-24 RX ADMIN — SODIUM BICARBONATE 3 ML: 84 INJECTION, SOLUTION INTRAVENOUS at 02:34

## 2021-06-24 RX ADMIN — FOLIC ACID 1 MG: 1 TABLET ORAL at 05:43

## 2021-06-24 RX ADMIN — ATORVASTATIN CALCIUM 80 MG: 80 TABLET, FILM COATED ORAL at 17:39

## 2021-06-24 RX ADMIN — DOCUSATE SODIUM 50 MG AND SENNOSIDES 8.6 MG 2 TABLET: 8.6; 5 TABLET, FILM COATED ORAL at 05:43

## 2021-06-24 RX ADMIN — SODIUM BICARBONATE 3 ML: 84 INJECTION, SOLUTION INTRAVENOUS at 10:56

## 2021-06-24 RX ADMIN — INSULIN HUMAN 1 UNITS: 100 INJECTION, SOLUTION PARENTERAL at 05:55

## 2021-06-24 RX ADMIN — FAMOTIDINE 20 MG: 20 TABLET ORAL at 05:43

## 2021-06-24 RX ADMIN — THIAMINE HCL TAB 100 MG 100 MG: 100 TAB at 20:47

## 2021-06-24 ASSESSMENT — PAIN DESCRIPTION - PAIN TYPE
TYPE: ACUTE PAIN
TYPE: ACUTE PAIN

## 2021-06-24 ASSESSMENT — FIBROSIS 4 INDEX: FIB4 SCORE: 2.87

## 2021-06-24 NOTE — CARE PLAN
The patient is Watcher - moderate risk of deterioration      Progress made toward(s) clinical / shift goals: neuro status will remain stable or improve    Patient is not progressing towards the following goals:      Problem: Neuro Status  Goal: Neuro status will remain stable or improve  Outcome: Progressing     Problem: Urinary Elimination  Goal: Establish and maintain regular urinary output  Outcome: Progressing     Problem: Pain - Standard  Goal: Alleviation of pain or a reduction in pain to the patient’s comfort goal  Outcome: Progressing

## 2021-06-24 NOTE — DISCHARGE PLANNING
Hospital Care Management Team Assessment    In the case of an emergency, pt's NOK are Jazmyne Dunn (Niece) [phone] and Lazara Puga (Niece) [phone].     This RNCM spoke with Pt's sister-in-law over the phone (as well as  staff) and obtained the information used in this assessment.  Pt lives at [facility] in [place]. Pt uses the [facility] pharmacy on [address]. Prior to current hospitalization, pt was completely independent with ADLS/IADLS.  attendants only assisted with medication administration. Pt has no DME at home. Pt has prescription coverage. Pt has [place] [facility] transport benefits. Pt has no history of substance abuse. Pt is schizophrenic. Pt has support from  staff and limited support from family in [place]. Pt has never completed an Advance Directive. Pt's discharge plan is to be determined, pending goals of care discussion with Pt's NOK. Physiatry consult pending, anticipate Pt will need IRF vs SNF once Pt is stable and no longer requiring ventilator support.     Care Transition Team Assessment    Information Source  Orientation Level: Unable to assess  Information Given By: Relative (Sister-in-law)  Informant's Name: Milagros    Readmission Evaluation  Is this a readmission?: No    Elopement Risk  Legal Hold: No  Ambulatory or Self Mobile in Wheelchair: No-Not an Elopement Risk  Elopement Risk: Not at Risk for Elopement    Interdisciplinary Discharge Planning  Lives with - Patient's Self Care Capacity: Attendant / Paid Care Giver  Housing / Facility: [facility], [address] [place] [address]  Prior Services: Intermittent Physical Support for ADL Per Service (IADLs)    Discharge Preparedness  What is your plan after discharge?: Uncertain - pending medical team collaboration (pending goals of care discussion)  What are your discharge supports?: Other (comment) ( staff)  Prior Functional Level: Independent with Activities of Daily Living    Functional Assesment  Prior Functional Level:  Independent with Activities of Daily Living    Finances  Prescription Coverage: Yes    Advance Directive  Advance Directive?: None, Clinically incapacitated / Inappropriate    Psychological Assessment  History of Substance Abuse: None  History of Psychiatric Problems: Yes (Schizophrenia)  Newly Diagnosed Illness: Yes    Discharge Risks or Barriers  Discharge risks or barriers?: Complex medical needs, Post-acute placement / services    Anticipated Discharge Information  Discharge Disposition: Still a Patient (30)

## 2021-06-24 NOTE — THERAPY
PT Contact Note    PT tx attempted. Per nsg, pt with limited command following when sedation weaned. Hold PT tx today as pt not able to participate in skilled therapy.     Valeria Shah, PT, DPT  Ext. [phone]

## 2021-06-24 NOTE — PROGRESS NOTES
[facility] Progress Note      Admit Date: 6/20/2021    Resident(s): Nikhil Collado M.D.   Attending:  CHICA ROY/ Dr. Elam    Patient ID:    Name:  Morgan Davis     YOB: 1950  Age:  70 y.o.  male   MRN:  [id number]    Hospital Course (carried forward and updated):  Morgan Davis is a 70 y.o. male with a PMH of schizoprhenia, hypothyroidism, hypertension, COPD, NIDDM who presented from a group home after falling out of his bed due to right-sided weakness. The patient was found to have a distal M1 throumbus in the left MCA. Not a TPA candidate. IR performed thrombectomy on admission. Patient was started on Unasyn intubated for airway protection and respiratory failure secondary to aspiration on 6/20/21.   6/22/20: Unasyn discontinued , having copious secretions from vent    Consultants:  Critical Care  Neurology   Interventional Radiology    Interval Events:  No acute overnight events. Continues having significant secretions.  BP within goals    See note of 6/23/21 to see goals of care conversation per family. If no improvement in his condition they would be more inclined towards comfort care. Will keep them updated.    Vitals Range last 24h:  Pulse:  [] 50  Resp:  [15-30] 20  BP: ()/() 150/70  SpO2:  [92 %-100 %] 95 %      Intake/Output Summary (Last 24 hours) at 6/24/2021 0807  Last data filed at 6/24/2021 0600  Gross per 24 hour   Intake 1962 ml   Output 2145 ml   Net -183 ml        Review of Systems   Unable to perform ROS: Intubated         PHYSICAL EXAM:  Vitals:    06/24/21 0500 06/24/21 0600 06/24/21 0643 06/24/21 0800   BP: 140/64 119/58  150/70   Pulse: 71 (!) 58 69 (!) 50   Resp: 15 16 16 20   Temp:       TempSrc:  Bladder     SpO2: 93% 94% 94% 95%   Weight:       Height:        Body mass index is 24.58 kg/m².    O2 therapy: Pulse Oximetry: 95 %, O2 Delivery Device: Ventilator        Physical Exam  Vitals and nursing note reviewed.   Constitutional:        Appearance: He is ill-appearing and toxic-appearing.      Comments: Intubated   HENT:      Mouth/Throat:      Pharynx: Oropharyngeal exudate present.      Comments: Heavy secretions present  Eyes:      Extraocular Movements: Extraocular movements intact.      Pupils: Pupils are equal, round, and reactive to light.   Cardiovascular:      Rate and Rhythm: Regular rhythm. Bradycardia present.      Heart sounds: No murmur heard.   No friction rub. No gallop.    Pulmonary:      Breath sounds: Wheezing present.   Abdominal:      General: There is no distension.      Palpations: There is no mass.      Tenderness: There is no abdominal tenderness.      Hernia: No hernia is present.   Musculoskeletal:         General: No swelling, tenderness, deformity or signs of injury.      Cervical back: No rigidity or tenderness.   Skin:     Coloration: Skin is not jaundiced or pale.      Findings: No erythema.   Neurological:      Deep Tendon Reflexes: Reflexes abnormal.      Comments: Moves both left upper and lower extremities spontaneously. Absent movement right hemibody  Withdraws right upper and lower extremities to pain.  More sedated, does not respond to commands or open eyes  Patellar refexes, 3+ bilterally             Recent Labs     06/22/21  0415 06/23/21  0400 06/24/21  0336   SODIUM 143 144 141   POTASSIUM 4.0 4.0 4.4   CHLORIDE 110 110 109   CO2 28 27 25   BUN 15 20 23*   CREATININE 0.85 0.86 0.82   MAGNESIUM 2.1 2.1  --    PHOSPHORUS 2.2* 3.3  --    CALCIUM 9.4 9.3 9.6     Recent Labs     06/22/21  0415 06/23/21  0400 06/24/21  0336   GLUCOSE 110* 124* 133*     Recent Labs     06/22/21  0415 06/23/21  0400 06/24/21  0336   RBC 4.43* 4.44* 4.65*   HEMOGLOBIN 14.0 14.1 14.8   HEMATOCRIT 43.4 43.4 45.5   PLATELETCT 131* 134* 134*     Recent Labs     06/22/21 0415 06/23/21  0400 06/24/21  0336   WBC 10.9* 11.0* 10.2   NEUTSPOLYS 67.90 71.00 69.20   LYMPHOCYTES 21.80* 16.40* 18.30*   MONOCYTES 9.00 10.60 9.80   EOSINOPHILS  0.70 1.40 2.00   BASOPHILS 0.20 0.20 0.20       Meds:  • enalaprilat  1.25 mg     • labetalol  10 mg     • sodium bicarbonate  2-3 mL     • acetaminophen  650 mg     • enoxaparin (LOVENOX) injection  40 mg     • OLANZapine  10 mg     • hydrALAZINE  10-20 mg     • LORazepam  1-2 mg     • insulin regular  1-6 Units      And   • dextrose 50%  50 mL     • Pharmacy  1 Each     • aspirin  81 mg     • atorvastatin  80 mg     • cyanocobalamin  1,000 mcg     • folic acid  1 mg     • levothyroxine  25 mcg     • senna-docusate  2 tablet      And   • polyethylene glycol/lytes  1 Packet      And   • magnesium hydroxide  30 mL      And   • bisacodyl  10 mg     • thiamine  100 mg     • fentaNYL   Stopped (06/20/21 3730)   • Respiratory Therapy Consult       • famotidine  20 mg     • MD Alert...Adult ICU Electrolyte Replacement per Pharmacy       • lidocaine  2 mL     • propofol  0-80 mcg/kg/min 10 mcg/kg/min (06/24/21 0283)        Procedures:  Central line  Endotracheal intubation    Imaging:  DX-CHEST-PORTABLE (1 VIEW)   Final Result      Stable chest with hyperinflation and aortic ectasia      MR-BRAIN-W/O   Final Result      1.  Moderate to large area of ischemia in the LEFT frontal and parietal lobes involving the basal ganglia with scattered areas of ischemia in the LEFT posterior temporal and occipital lobes, MCA territory. The area of ischemia is much smaller than the    area of tissue at risks seen on the perfusion CT.   2.  No acute hemorrhage   3.  Small areas of encephalomalacia in the RIGHT frontal cortex and BILATERAL cerebellum   4.  Atrophy   5.  White matter changes      EC-ECHOCARDIOGRAM COMPLETE W/O CONT   Final Result      CT-HEAD W/O   Final Result      1.  Developing left frontotemporal infarct with associated cortically based low density      2.  Negative for hemorrhage      3.  Underlying cerebral volume loss and white matter change      DX-CHEST-PORTABLE (1 VIEW)   Final Result      No significant change       DX-CHEST-PORTABLE (1 VIEW)   Final Result      Radiographically satisfactory positioning of support hardware and no pneumothorax is seen      DX-ABDOMEN FOR TUBE PLACEMENT   Final Result      Feeding tube extends below the diaphragm with tip at the level of the proximal duodenum. No metallic instruments are identified.      CT-HEAD W/O   Final Result         NO ACUTE ABNORMALITIES ARE NOTED ON CT SCAN OF THE HEAD.      Findings are consistent with atrophy.  Decreased attenuation in the periventricular white matter likely indicates microvascular ischemic disease.      IR-THROMBO MECHANICAL ARTERY,INIT   Final Result         70-year-old patient who presented with acute onset of right-sided upper and lower extremity weakness and slurred speech was found to have a left M1 occlusion. Patient underwent emergent left MCA thrombectomy with complete restoration of flow to the left    MCA as described above. Minimal distal embolization to a distal parietal M4 cortical branches noted.      Final recanalization score: TICI 2C.               DX-CHEST-PORTABLE (1 VIEW)   Final Result      No acute cardiopulmonary findings.      CT-CTA HEAD WITH & W/O-POST PROCESS   Final Result      Thrombus in the distal M1 segment of the left middle cerebral artery. Reconstitution of the M2 middle cerebral artery branches.               Comment: Results discussed with Dr. Ren at approximately 4:35 AM      CT-CTA NECK WITH & W/O-POST PROCESSING   Final Result      Atherosclerotic disease. Resulting stenosis is less than 50%      CT-CSPINE WITHOUT PLUS RECONS   Final Result      No acute fracture is identified.      CT-CEREBRAL PERFUSION ANALYSIS   Final Result      1.  Cerebral blood flow less than 30% likely representing completed infarct = 60 mL.      2.  T Max more than 6 seconds likely representing combination of completed infarct and ischemia = 231 mL.      3.  Mismatched volume likely representing ischemic brain/penumbra = 171 mL       4.  Please note that the cerebral perfusion was performed on the limited brain tissue around the basal ganglia region. Infarct/ischemia outside the CT perfusion sections can be missed in this study.      CT-HEAD W/O   Final Result      1.  No acute intracranial findings.      2.  Diffuse atrophy and periventricular white matter changes, consistent with chronic small vessel.                   ASSESSEMENT and PLAN:    * Acute ischemic stroke (HCC)- (present on admission)  Assessment & Plan  Patient presented from group home with right sided weakness. Initial CT head without acute abnormality. CTA head was notable for a thrombus in the distal M1 segment of the left MCA. The patient was not a candidate for TPA given unknown last well time. Patient underwent Left MCA mechanical thrombectomy on 6/19. Repeat CT head shows evolving left frontotemporal infarct; Able to move to edge of bed with PT on 6/21/21  MRI with left frontal parietal infarct; Left posterior, temporal, and occipital lobe infarcts also present consistent with left MCA territory.. ECHO with EF of 60%, RVSP 27    Plan:  - Neurochecks per protocol   - Goal SBP < 140, on Cardene drip  - Aspirin, high intensity statin  -lovenox 40mg for DVT prophylaxis  - Precedex held due to episode of ventricular bigeminy  - Neurology signed off  - Speech evaluation/PT/OT as able  -Advanced care planning, pending [facility] conversation with family      Acute respiratory failure with hypoxia (HCC)- (present on admission)  Assessment & Plan  Was intubated for respiratory failure and inability to protect airway in setting of likely aspiration  Discontinued Unasyn as patient is afebrile with no leukocytosis and no infiltrates on X-ray  ABCDEF Bundle  Vent per RT, SAT/SBT when able      Type 2 diabetes mellitus, without long-term current use of insulin (HCC)- (present on admission)  Assessment & Plan  A1c 5.9  Has only required 1 time insulin this admission     Hyperlipidemia-  (present on admission)  Assessment & Plan  Lipid panel shows LDL 85  Continue statin  Fibrate discontinued,     Hypertension- (present on admission)  Assessment & Plan  Home regimen: Lisinopril 2.5mg   Goal of normotension  - Hydralazine prn for SBP>160  - Labetalol prn for SBP>160  - Hold home lisinopril for now      Hypothyroidism- (present on admission)  Assessment & Plan  TSH normal at 2.18  Continue Synthroid      Schizophrenia (HCC)- (present on admission)  Assessment & Plan  History of. QTc 470ms  -Restart zyprexa  -Hold trazodone        DISPO: ICU for mechanical ventilation    CODE STATUS: DNAR    Quality Measures:  Feeding: Tube Feeds at goal diabetisource  Analgesia: Fentanyl  Sedation: Propofol  Thromboprophylaxis: lovenox, SCDs  Head of bed: >30 degrees  Ulcer prophylaxis: Pepcid  Bowel care: bowel regimen  Indwelling lines: PIV  Deescalation of antibiotics: Unasyn 6/20-6/22      Nikhil Collado M.D.

## 2021-06-24 NOTE — CARE PLAN
Ventilator Daily Summary     Vent Day # 5     ETT 8.0 @ 25     Ventilator settings changed this shift:no     APVcmv 16 440 8 30%     Respiratory Procedures:no     Plan: Continue current ventilator settings and wean mechanical ventilation as tolerated per physician orders.

## 2021-06-25 ENCOUNTER — APPOINTMENT (OUTPATIENT)
Dept: RADIOLOGY | Facility: MEDICAL CENTER | Age: 71
DRG: 023 | End: 2021-06-25
Attending: HOSPITALIST
Payer: MEDICARE

## 2021-06-25 LAB
ANION GAP SERPL CALC-SCNC: 7 MMOL/L (ref 7–16)
BACTERIA BLD CULT: NORMAL
BACTERIA BLD CULT: NORMAL
BASOPHILS # BLD AUTO: 0.2 % (ref 0–1.8)
BASOPHILS # BLD: 0.02 K/UL (ref 0–0.12)
BUN SERPL-MCNC: 33 MG/DL (ref 8–22)
CALCIUM SERPL-MCNC: 9.7 MG/DL (ref 8.5–10.5)
CHLORIDE SERPL-SCNC: 110 MMOL/L (ref 96–112)
CO2 SERPL-SCNC: 26 MMOL/L (ref 20–33)
CREAT SERPL-MCNC: 0.86 MG/DL (ref 0.5–1.4)
EOSINOPHIL # BLD AUTO: 0.2 K/UL (ref 0–0.51)
EOSINOPHIL NFR BLD: 2.4 % (ref 0–6.9)
ERYTHROCYTE [DISTWIDTH] IN BLOOD BY AUTOMATED COUNT: 51.1 FL (ref 35.9–50)
GLUCOSE BLD-MCNC: 101 MG/DL (ref 65–99)
GLUCOSE BLD-MCNC: 102 MG/DL (ref 65–99)
GLUCOSE BLD-MCNC: 144 MG/DL (ref 65–99)
GLUCOSE BLD-MCNC: 144 MG/DL (ref 65–99)
GLUCOSE SERPL-MCNC: 162 MG/DL (ref 65–99)
HCT VFR BLD AUTO: 42 % (ref 42–52)
HGB BLD-MCNC: 13.4 G/DL (ref 14–18)
IMM GRANULOCYTES # BLD AUTO: 0.03 K/UL (ref 0–0.11)
IMM GRANULOCYTES NFR BLD AUTO: 0.4 % (ref 0–0.9)
LYMPHOCYTES # BLD AUTO: 2.15 K/UL (ref 1–4.8)
LYMPHOCYTES NFR BLD: 25.4 % (ref 22–41)
MCH RBC QN AUTO: 32.1 PG (ref 27–33)
MCHC RBC AUTO-ENTMCNC: 31.9 G/DL (ref 33.7–35.3)
MCV RBC AUTO: 100.7 FL (ref 81.4–97.8)
MONOCYTES # BLD AUTO: 0.78 K/UL (ref 0–0.85)
MONOCYTES NFR BLD AUTO: 9.2 % (ref 0–13.4)
NEUTROPHILS # BLD AUTO: 5.29 K/UL (ref 1.82–7.42)
NEUTROPHILS NFR BLD: 62.4 % (ref 44–72)
NRBC # BLD AUTO: 0 K/UL
NRBC BLD-RTO: 0 /100 WBC
PLATELET # BLD AUTO: 127 K/UL (ref 164–446)
PMV BLD AUTO: 10.2 FL (ref 9–12.9)
POTASSIUM SERPL-SCNC: 3.9 MMOL/L (ref 3.6–5.5)
RBC # BLD AUTO: 4.17 M/UL (ref 4.7–6.1)
SIGNIFICANT IND 70042: NORMAL
SIGNIFICANT IND 70042: NORMAL
SITE SITE: NORMAL
SITE SITE: NORMAL
SODIUM SERPL-SCNC: 143 MMOL/L (ref 135–145)
SOURCE SOURCE: NORMAL
SOURCE SOURCE: NORMAL
TRIGL SERPL-MCNC: 95 MG/DL (ref 0–149)
WBC # BLD AUTO: 8.5 K/UL (ref 4.8–10.8)

## 2021-06-25 PROCEDURE — 700111 HCHG RX REV CODE 636 W/ 250 OVERRIDE (IP): Performed by: INTERNAL MEDICINE

## 2021-06-25 PROCEDURE — 99233 SBSQ HOSP IP/OBS HIGH 50: CPT | Performed by: HOSPITALIST

## 2021-06-25 PROCEDURE — 94799 UNLISTED PULMONARY SVC/PX: CPT

## 2021-06-25 PROCEDURE — A9270 NON-COVERED ITEM OR SERVICE: HCPCS | Performed by: STUDENT IN AN ORGANIZED HEALTH CARE EDUCATION/TRAINING PROGRAM

## 2021-06-25 PROCEDURE — 84478 ASSAY OF TRIGLYCERIDES: CPT

## 2021-06-25 PROCEDURE — 99291 CRITICAL CARE FIRST HOUR: CPT | Mod: GC | Performed by: EMERGENCY MEDICINE

## 2021-06-25 PROCEDURE — 94003 VENT MGMT INPAT SUBQ DAY: CPT

## 2021-06-25 PROCEDURE — 82962 GLUCOSE BLOOD TEST: CPT | Mod: 91

## 2021-06-25 PROCEDURE — 700102 HCHG RX REV CODE 250 W/ 637 OVERRIDE(OP): Performed by: STUDENT IN AN ORGANIZED HEALTH CARE EDUCATION/TRAINING PROGRAM

## 2021-06-25 PROCEDURE — 700102 HCHG RX REV CODE 250 W/ 637 OVERRIDE(OP): Performed by: PSYCHIATRY & NEUROLOGY

## 2021-06-25 PROCEDURE — 700111 HCHG RX REV CODE 636 W/ 250 OVERRIDE (IP): Performed by: STUDENT IN AN ORGANIZED HEALTH CARE EDUCATION/TRAINING PROGRAM

## 2021-06-25 PROCEDURE — 85025 COMPLETE CBC W/AUTO DIFF WBC: CPT

## 2021-06-25 PROCEDURE — 97530 THERAPEUTIC ACTIVITIES: CPT

## 2021-06-25 PROCEDURE — A9270 NON-COVERED ITEM OR SERVICE: HCPCS | Performed by: EMERGENCY MEDICINE

## 2021-06-25 PROCEDURE — 97112 NEUROMUSCULAR REEDUCATION: CPT

## 2021-06-25 PROCEDURE — 80048 BASIC METABOLIC PNL TOTAL CA: CPT

## 2021-06-25 PROCEDURE — A9270 NON-COVERED ITEM OR SERVICE: HCPCS | Performed by: INTERNAL MEDICINE

## 2021-06-25 PROCEDURE — A9270 NON-COVERED ITEM OR SERVICE: HCPCS | Performed by: PSYCHIATRY & NEUROLOGY

## 2021-06-25 PROCEDURE — 97535 SELF CARE MNGMENT TRAINING: CPT

## 2021-06-25 PROCEDURE — 700102 HCHG RX REV CODE 250 W/ 637 OVERRIDE(OP): Performed by: EMERGENCY MEDICINE

## 2021-06-25 PROCEDURE — 770006 HCHG ROOM/CARE - MED/SURG/GYN SEMI*

## 2021-06-25 PROCEDURE — 94640 AIRWAY INHALATION TREATMENT: CPT

## 2021-06-25 PROCEDURE — 700102 HCHG RX REV CODE 250 W/ 637 OVERRIDE(OP): Performed by: INTERNAL MEDICINE

## 2021-06-25 RX ADMIN — HYDROMORPHONE HYDROCHLORIDE 0.5 MG: 1 INJECTION, SOLUTION INTRAMUSCULAR; INTRAVENOUS; SUBCUTANEOUS at 02:11

## 2021-06-25 RX ADMIN — DOCUSATE SODIUM 50 MG AND SENNOSIDES 8.6 MG 2 TABLET: 8.6; 5 TABLET, FILM COATED ORAL at 05:13

## 2021-06-25 RX ADMIN — POTASSIUM BICARBONATE 25 MEQ: 978 TABLET, EFFERVESCENT ORAL at 08:10

## 2021-06-25 RX ADMIN — SODIUM BICARBONATE 3 ML: 84 INJECTION, SOLUTION INTRAVENOUS at 03:00

## 2021-06-25 RX ADMIN — SODIUM BICARBONATE 3 ML: 84 INJECTION, SOLUTION INTRAVENOUS at 06:49

## 2021-06-25 RX ADMIN — ASPIRIN 81 MG: 81 TABLET, CHEWABLE ORAL at 05:14

## 2021-06-25 RX ADMIN — THIAMINE HCL TAB 100 MG 100 MG: 100 TAB at 21:32

## 2021-06-25 RX ADMIN — OLANZAPINE 10 MG: 5 TABLET, FILM COATED ORAL at 17:21

## 2021-06-25 RX ADMIN — FOLIC ACID 1 MG: 1 TABLET ORAL at 05:14

## 2021-06-25 RX ADMIN — ENOXAPARIN SODIUM 40 MG: 40 INJECTION SUBCUTANEOUS at 05:14

## 2021-06-25 RX ADMIN — CYANOCOBALAMIN TAB 500 MCG 1000 MCG: 500 TAB at 05:14

## 2021-06-25 RX ADMIN — DOCUSATE SODIUM 50 MG AND SENNOSIDES 8.6 MG 2 TABLET: 8.6; 5 TABLET, FILM COATED ORAL at 17:21

## 2021-06-25 RX ADMIN — LEVOTHYROXINE SODIUM 25 MCG: 0.03 TABLET ORAL at 05:14

## 2021-06-25 RX ADMIN — FAMOTIDINE 20 MG: 20 TABLET ORAL at 05:14

## 2021-06-25 RX ADMIN — ATORVASTATIN CALCIUM 80 MG: 80 TABLET, FILM COATED ORAL at 17:22

## 2021-06-25 ASSESSMENT — COGNITIVE AND FUNCTIONAL STATUS - GENERAL
TURNING FROM BACK TO SIDE WHILE IN FLAT BAD: UNABLE
WALKING IN HOSPITAL ROOM: TOTAL
HELP NEEDED FOR BATHING: TOTAL
DRESSING REGULAR UPPER BODY CLOTHING: A LOT
PERSONAL GROOMING: A LOT
DAILY ACTIVITIY SCORE: 8
DRESSING REGULAR LOWER BODY CLOTHING: TOTAL
SUGGESTED CMS G CODE MODIFIER MOBILITY: CN
EATING MEALS: TOTAL
SUGGESTED CMS G CODE MODIFIER DAILY ACTIVITY: CM
TOILETING: TOTAL
MOVING TO AND FROM BED TO CHAIR: UNABLE
MOVING FROM LYING ON BACK TO SITTING ON SIDE OF FLAT BED: UNABLE
CLIMB 3 TO 5 STEPS WITH RAILING: TOTAL
MOBILITY SCORE: 6
STANDING UP FROM CHAIR USING ARMS: TOTAL

## 2021-06-25 ASSESSMENT — FIBROSIS 4 INDEX: FIB4 SCORE: 3.03

## 2021-06-25 ASSESSMENT — PAIN DESCRIPTION - PAIN TYPE
TYPE: ACUTE PAIN

## 2021-06-25 ASSESSMENT — GAIT ASSESSMENTS: GAIT LEVEL OF ASSIST: UNABLE TO PARTICIPATE

## 2021-06-25 NOTE — PROGRESS NOTES
[facility] Progress Note      Admit Date: 6/20/2021    Resident(s): Linda Baker M.D.   Attending:  CHICA ROY/ Dr. Elam    Patient ID:    Name:  Morgan Davis     YOB: 1950  Age:  70 y.o.  male   MRN:  [id number]    Hospital Course (carried forward and updated):  Morgan Davis is a 70 y.o. male with a PMH of schizoprhenia, hypothyroidism, hypertension, COPD, NIDDM who presented from a group home after falling out of his bed due to right-sided weakness. The patient was found to have a distal M1 throumbus in the left MCA. Not a TPA candidate. IR performed thrombectomy on admission. Patient was started on Unasyn intubated for airway protection and respiratory failure secondary to aspiration on 6/20/21.   6/22/20: Unasyn discontinued , having copious secretions from vent    Consultants:  Critical Care  Neurology   Interventional Radiology    Interval Events:  No acute overnight events.   BP within goals, one way extubation this am, after discussion with surrogate decision maker yesterday. Stable on 4L now.  Patient to be transfered to hospitalist service.    Vitals Range last 24h:  Pulse:  [57-91] 69  Resp:  [13-26] 13  BP: ()/(51-87) 117/56  SpO2:  [92 %-100 %] 95 %      Intake/Output Summary (Last 24 hours) at 6/25/2021 0853  Last data filed at 6/25/2021 0600  Gross per 24 hour   Intake 1885.45 ml   Output 2000 ml   Net -114.55 ml        Review of Systems   Unable to perform ROS: Intubated         PHYSICAL EXAM:  Vitals:    06/25/21 0400 06/25/21 0500 06/25/21 0600 06/25/21 0624   BP: (!) 93/55 (!) 93/55 117/56    Pulse: (!) 57 (!) 57 (!) 57 69   Resp: 16 16 16 13   Temp:       TempSrc: Bladder  Bladder    SpO2: 95% 95% 95% 95%   Weight:       Height:        Body mass index is 24.58 kg/m².    O2 therapy: Pulse Oximetry: 95 %, O2 Delivery Device: Ventilator        Physical Exam  Vitals and nursing note reviewed.   Constitutional:       Appearance: He is ill-appearing and toxic-appearing.       Comments: Intubated   HENT:      Mouth/Throat:      Pharynx: Oropharyngeal exudate present.      Comments: Heavy secretions present  Eyes:      Extraocular Movements: Extraocular movements intact.      Pupils: Pupils are equal, round, and reactive to light.   Cardiovascular:      Rate and Rhythm: Regular rhythm. Bradycardia present.      Heart sounds: No murmur heard.   No friction rub. No gallop.    Pulmonary:      Breath sounds: Wheezing present.   Abdominal:      General: There is no distension.      Palpations: There is no mass.      Tenderness: There is no abdominal tenderness.      Hernia: No hernia is present.   Musculoskeletal:         General: No swelling, tenderness, deformity or signs of injury.      Cervical back: No rigidity or tenderness.   Skin:     Coloration: Skin is not jaundiced or pale.      Findings: No erythema.   Neurological:      Deep Tendon Reflexes: Reflexes abnormal.      Comments: Moves both left upper and lower extremities spontaneously. Absent movement right hemibody  Withdraws right upper and lower extremities to pain.  More sedated, does not respond to commands or open eyes  Patellar refexes, 3+ bilterally             Recent Labs     06/23/21  0400 06/24/21  0336 06/25/21  0530   SODIUM 144 141 143   POTASSIUM 4.0 4.4 3.9   CHLORIDE 110 109 110   CO2 27 25 26   BUN 20 23* 33*   CREATININE 0.86 0.82 0.86   MAGNESIUM 2.1  --   --    PHOSPHORUS 3.3  --   --    CALCIUM 9.3 9.6 9.7     Recent Labs     06/23/21  0400 06/24/21  0336 06/25/21  0530   GLUCOSE 124* 133* 162*     Recent Labs     06/23/21  0400 06/24/21  0336 06/25/21  0530   RBC 4.44* 4.65* 4.17*   HEMOGLOBIN 14.1 14.8 13.4*   HEMATOCRIT 43.4 45.5 42.0   PLATELETCT 134* 134* 127*     Recent Labs     06/23/21  0400 06/24/21  0336 06/25/21  0530   WBC 11.0* 10.2 8.5   NEUTSPOLYS 71.00 69.20 62.40   LYMPHOCYTES 16.40* 18.30* 25.40   MONOCYTES 10.60 9.80 9.20   EOSINOPHILS 1.40 2.00 2.40   BASOPHILS 0.20 0.20 0.20        Meds:  • HYDROmorphone  0.5 mg     • enalaprilat  1.25 mg     • labetalol  10 mg     • acetaminophen  650 mg     • enoxaparin (LOVENOX) injection  40 mg     • OLANZapine  10 mg     • hydrALAZINE  10-20 mg     • LORazepam  1-2 mg     • insulin regular  1-6 Units      And   • dextrose 50%  50 mL     • Pharmacy  1 Each     • aspirin  81 mg     • atorvastatin  80 mg     • cyanocobalamin  1,000 mcg     • folic acid  1 mg     • levothyroxine  25 mcg     • senna-docusate  2 tablet      And   • polyethylene glycol/lytes  1 Packet      And   • magnesium hydroxide  30 mL      And   • bisacodyl  10 mg     • thiamine  100 mg     • Respiratory Therapy Consult       • MD Alert...Adult ICU Electrolyte Replacement per Pharmacy            Procedures:  Central line  Endotracheal intubation    Imaging:  DX-CHEST-PORTABLE (1 VIEW)   Final Result      Stable chest with hyperinflation and aortic ectasia      MR-BRAIN-W/O   Final Result      1.  Moderate to large area of ischemia in the LEFT frontal and parietal lobes involving the basal ganglia with scattered areas of ischemia in the LEFT posterior temporal and occipital lobes, MCA territory. The area of ischemia is much smaller than the    area of tissue at risks seen on the perfusion CT.   2.  No acute hemorrhage   3.  Small areas of encephalomalacia in the RIGHT frontal cortex and BILATERAL cerebellum   4.  Atrophy   5.  White matter changes      EC-ECHOCARDIOGRAM COMPLETE W/O CONT   Final Result      CT-HEAD W/O   Final Result      1.  Developing left frontotemporal infarct with associated cortically based low density      2.  Negative for hemorrhage      3.  Underlying cerebral volume loss and white matter change      DX-CHEST-PORTABLE (1 VIEW)   Final Result      No significant change      DX-CHEST-PORTABLE (1 VIEW)   Final Result      Radiographically satisfactory positioning of support hardware and no pneumothorax is seen      DX-ABDOMEN FOR TUBE PLACEMENT   Final Result       Feeding tube extends below the diaphragm with tip at the level of the proximal duodenum. No metallic instruments are identified.      CT-HEAD W/O   Final Result         NO ACUTE ABNORMALITIES ARE NOTED ON CT SCAN OF THE HEAD.      Findings are consistent with atrophy.  Decreased attenuation in the periventricular white matter likely indicates microvascular ischemic disease.      IR-THROMBO MECHANICAL ARTERY,INIT   Final Result         70-year-old patient who presented with acute onset of right-sided upper and lower extremity weakness and slurred speech was found to have a left M1 occlusion. Patient underwent emergent left MCA thrombectomy with complete restoration of flow to the left    MCA as described above. Minimal distal embolization to a distal parietal M4 cortical branches noted.      Final recanalization score: TICI 2C.               DX-CHEST-PORTABLE (1 VIEW)   Final Result      No acute cardiopulmonary findings.      CT-CTA HEAD WITH & W/O-POST PROCESS   Final Result      Thrombus in the distal M1 segment of the left middle cerebral artery. Reconstitution of the M2 middle cerebral artery branches.               Comment: Results discussed with Dr. Ren at approximately 4:35 AM      CT-CTA NECK WITH & W/O-POST PROCESSING   Final Result      Atherosclerotic disease. Resulting stenosis is less than 50%      CT-CSPINE WITHOUT PLUS RECONS   Final Result      No acute fracture is identified.      CT-CEREBRAL PERFUSION ANALYSIS   Final Result      1.  Cerebral blood flow less than 30% likely representing completed infarct = 60 mL.      2.  T Max more than 6 seconds likely representing combination of completed infarct and ischemia = 231 mL.      3.  Mismatched volume likely representing ischemic brain/penumbra = 171 mL      4.  Please note that the cerebral perfusion was performed on the limited brain tissue around the basal ganglia region. Infarct/ischemia outside the CT perfusion sections can be missed in  this study.      CT-HEAD W/O   Final Result      1.  No acute intracranial findings.      2.  Diffuse atrophy and periventricular white matter changes, consistent with chronic small vessel.                   ASSESSEMENT and PLAN:    * Acute ischemic stroke (HCC)- (present on admission)  Assessment & Plan  Patient presented from group home with right sided weakness. Initial CT head without acute abnormality. CTA head was notable for a thrombus in the distal M1 segment of the left MCA. The patient was not a candidate for TPA given unknown last well time. Patient underwent Left MCA mechanical thrombectomy on 6/19. Repeat CT head shows evolving left frontotemporal infarct; Able to move to edge of bed with PT on 6/21/21  MRI with left frontal parietal infarct; Left posterior, temporal, and occipital lobe infarcts also present consistent with left MCA territory.. ECHO with EF of 60%, RVSP 27    Plan:  - Neurochecks per protocol   - Goal SBP < 140  - Aspirin, high intensity statin  -lovenox 40mg for DVT prophylaxis  - Precedex held due to episode of ventricular bigeminy  - Neurology signed off  - Speech evaluation/PT/OT as able  -Advanced care planning, made DNR, continuing GOC conversation with family,agreed with one way extubation.      Acute respiratory failure with hypoxia (HCC)- (present on admission)  Assessment & Plan  Was intubated for respiratory failure and inability to protect airway in setting of likely aspiration  Discontinued Unasyn as patient is afebrile with no leukocytosis and no infiltrates on X-ray  ABCDEF Bundle  One way extubation today, discussed with family yesterday and agreeable      Type 2 diabetes mellitus, without long-term current use of insulin (HCC)- (present on admission)  Assessment & Plan  A1c 5.9  Has only required 1 time insulin this admission     Hyperlipidemia- (present on admission)  Assessment & Plan  Lipid panel shows LDL 85  Continue statin  Fibrate discontinued, TG  139    Hypertension- (present on admission)  Assessment & Plan  Home regimen: Lisinopril 2.5mg   Goal of normotension  - Hydralazine prn for SBP>160  - Labetalol prn for SBP>160  - Hold home lisinopril for now      Hypothyroidism- (present on admission)  Assessment & Plan  TSH normal at 2.18  Continue Synthroid      Schizophrenia (HCC)- (present on admission)  Assessment & Plan  History of. QTc 470ms  -Restart zyprexa  -Hold trazodone        DISPO: transfer to hospitalist service    CODE STATUS: DNAR    Quality Measures:  Feeding: Tube Feeds at goal diabetisource  Analgesia: Fentanyl  Sedation: Propofol  Thromboprophylaxis: lovenox, SCDs  Head of bed: >30 degrees  Ulcer prophylaxis: Pepcid  Bowel care: bowel regimen  Indwelling lines: PIV  Deescalation of antibiotics: Unasyn 6/20-6/22      Linda Baker M.D.

## 2021-06-25 NOTE — PROGRESS NOTES
2 RN skin check complete with Quinn LEDEZMA.   Devices in place: SCDS, Vent  Skin assessed under devices yes  Confirmed pressure ulcers found on NA  New potential pressure ulcers noted on NA. Wound consult placed NA  The following interventions in place: q 2 TURN. Barrier cream. Low air loss mattress. zFlo pillow, mepilex.  The following issues noted:   Head Scab, Bruising and Redness   Shoulder Blades Redness and Blanching. Pillows under.   Arm/Elbow/Hand Redness, Blanching and Bruising   Arm/Elbow/Hand Redness, Blanching and Bruising  Abdomen Bruising  Groin Redness and Bruising  Scrotum/Coccyx/Buttocks Redness and Blanching  Heels are red and blanching. Floated with pillows.

## 2021-06-25 NOTE — PROGRESS NOTES
Cortrak Placement    Tube Team verified patient name and medical record number prior to tube placement.  Cortrak tube (43 inches, 12 [place]) placed at 68 cm in left nare.  Per Cortrak picture, tube appears to be in the stomach.  Nursing Instructions: Awaiting KUB to confirm placement before use for medications or feeding. Once placement confirmed, flush tube with 30 ml of water, and then remove and save stylet, in patient medication drawer.

## 2021-06-25 NOTE — THERAPY
Occupational Therapy  Daily Treatment     Patient Name: Morgan Davis  Age:  70 y.o., Sex:  male  Medical Record #: [id number]  Today's Date: 6/25/2021     Precautions  Precautions: Fall Risk, Swallow Precautions ( See Comments), Nasogastric Tube  Comments: extubated 6/24; R hemiplegia    Assessment    Extubated on 6/24. Able to follow directions. Worked on midline reorientation in prep for seated ADLs as well as light grooming EOB. R UE remains flaccid, 2 finger sublux noted to R shoulder today. Positioned at end of session for joint protection. Remains limited by weakness, fatigue, impaired balance, impaired cognition.     Plan    Treatment plan modified to 4 times per week until therapy goals are met for the following treatments:  Adaptive Equipment, Cognitive Skill Development, Manual Therapy Techniques, Neuro Re-Education / Balance, Self Care/Activities of Daily Living, Sensory Integration Techniques, Therapeutic Activities and Therapeutic Exercises.    DC Equipment Recommendations: Unable to determine at this time  Discharge Recommendations: Recommend post-acute placement for additional occupational therapy services prior to discharge home    Subjective    Pleasant and cooperative     Objective       06/25/21 1024   Cognition    Cognition / Consciousness X   Speech/ Communication Delayed Responses;Nods Appropriately   Level of Consciousness Alert   Ability To Follow Commands 1 Step   Attention Impaired   Comments pleasant and cooperative, nonverbal pt attempting to verbalize however very gurgly/garbled. able to give thumbs up appropriately. needs tasks demod for him first w/ good success of carryover   Active ROM Upper Body   Active ROM Upper Body  X   Dominant Hand Left   Flaccid Upper Extremity Right Upper Extremity Flaccid   Comments no AROM felt today R  UE, 2 finger shoulder subluxation noted   Strength Upper Body   Upper Body Strength  X   Comments RUE flaccid   Upper Body Muscle Tone   Upper Body Muscle  Tone  X   Rt Upper Extremity Muscle Tone Non Functional;Hypotonic   Balance   Sitting Balance (Static) Poor -   Sitting Balance (Dynamic) Trace +   Standing Balance (Static) Trace   Standing Balance (Dynamic) Dependent   Weight Shift Sitting Poor   Weight Shift Standing Absent   Skilled Intervention Verbal Cuing;Tactile Cuing;Sequencing;Postural Facilitation;Facilitation   Comments w/ 2 person assist   Bed Mobility    Supine to Sit Total Assist   Sit to Supine Total Assist   Scooting Total Assist   Rolling Moderate Assist to Rt.   Skilled Intervention Sequencing;Tactile Cuing;Verbal Cuing;Postural Facilitation;Facilitation;Compensatory Strategies   Activities of Daily Living   Grooming Moderate Assist;Seated  (oral care)   Lower Body Dressing Total Assist  (socks)   Skilled Intervention Verbal Cuing;Tactile Cuing;Sequencing;Compensatory Strategies   Comments needed demo of oral care first then was able to continue brushing   How much help from another person does the patient currently need...   Putting on and taking off regular lower body clothing? 1   Bathing (including washing, rinsing, and drying)? 1   Toileting, which includes using a toilet, bedpan, or urinal? 1   Putting on and taking off regular upper body clothing? 2   Taking care of personal grooming such as brushing teeth? 2   Eating meals? 1   6 Clicks Daily Activity Score 8   Modified Yoon (mRS)   Modified Kingfisher Score 5   Functional Mobility   Sit to Stand Maximal Assist  (x2 person)   Bed, Chair, Wheelchair Transfer Unable to Participate   Mobility EOB>STS x2>BTB   Skilled Intervention Verbal Cuing;Tactile Cuing;Sequencing;Postural Facilitation;Facilitation   ICU Target Mobility Level   ICU Mobility - Targeted Level Level 3A   Visual Perception   Visual Perception  X   Neglect Moderate Right    Comments some difficulty looking to left today as well however most difficulty looking to the right, able to do w/ max cueing   Patient / Family Goals    Patient / Family Goal #1 none stated   Short Term Goals   Short Term Goal # 1 pt will groom seated EOB w/ Blayne   Goal Outcome # 1 Progressing as expected   Short Term Goal # 2 pt will identify 3/3 objects on the right w/o cues   Goal Outcome # 2 Progressing as expected   Short Term Goal # 3 pt will demo stand pivot txf to BSC with modA   Goal Outcome # 3 Goal not met

## 2021-06-25 NOTE — THERAPY
Physical Therapy   Daily Treatment     Patient Name: Morgan Davis  Age:  70 y.o., Sex:  male  Medical Record #: [id number]  Today's Date: 6/25/2021     Precautions: Fall Risk, Swallow Precautions ( See Comments) (extubated 6/24 (r ajay))    Assessment    Pt progressing as expected given nature of co-morbidities, recent complications and limited mobility. He was able to demonstrate bed mobility with max to total A, EOB activity with physical assist and multiple sit<>stands with max A x2. He does demonstrate fair progression from prior visit with re: tracking/increased attention to R side (able to use LUE to find LUE/LLE with cueing/facilitation and demo). He also is able to consistently give appropriate head nods/thumbs up to questions, etc as well as improved activation of core/trunk with EOB and bed activities. However no noted engagement of RLE with attempt to stand and RLE. See below for details    Plan    Frequency increased to 4x/wk given current tolerance and increased participation/engagement     DC Equipment Recommendations: Unable to determine at this time  Discharge Recommendations: Recommend post-acute placement for additional physical therapy services prior to discharge home       06/25/21 1031   Cognition    Cognition / Consciousness X   Speech/ Communication Delayed Responses;Nods Appropriately   Level of Consciousness Alert   Ability To Follow Commands 2 Step  (as available; see below)   Attention Impaired   Comments pleasant and cooperative; non verbal; appropriate thumbs up; did fair with demo of task with re: carryover/following during visit (ie able ot cross midline with LUE to find RUE as well as RLE wtih demo/facilitatoin 1st)   Passive ROM Lower Body   Passive ROM Lower Body X   Comments noted some degree of hypertonicity at RLE knee and hip; decreased muscle length at gastrocsoleus   Active ROM Lower Body    Active ROM Lower Body    (no noted AROM at RLE)   Sensation Lower Body   Lower Extremity  Sensation   X   Comments noted gives thumbs up to sensation throughout L side; unclear level of intact   Vision   Vision Comments is able to track to R with facilitation/cueing; though delayed and limited maintaining gaze in this direction   Balance   Sitting Balance (Static) Poor -   Sitting Balance (Dynamic) Trace +   Standing Balance (Static) Trace   Standing Balance (Dynamic) Dependent   Weight Shift Sitting Poor   Weight Shift Standing Absent   Skilled Intervention Compensatory Strategies;Facilitation;Postural Facilitation;Sequencing;Tactile Cuing;Verbal Cuing   Comments sitting EOB with physical assist to maintain static sitting (though did not attempt to engage core/trunk to maintain stabilization and during reaching and LE activities); standing with facilitation of hip and knee and blocking RLE knee (essentially max A x2 for safety); noted not purposeful engagement of RLE during attmept to stand/weightshifting   Gait Analysis   Gait Level Of Assist Unable to Participate   Weight Bearing Status No restrictions   Bed Mobility    Supine to Sit Total Assist   Sit to Supine Total Assist   Scooting Total Assist   Rolling Moderate Assist to Rt.  (with assist for placement of extremeties, etc)   Skilled Intervention Compensatory Strategies;Tactile Cuing;Verbal Cuing;Sequencing;Postural Facilitation   Functional Mobility   Sit to Stand Maximal Assist  (x2)   Bed, Chair, Wheelchair Transfer Unable to Participate   Mobility bed mobility, EOB, sit<>stands x3   Skilled Intervention Verbal Cuing;Tactile Cuing;Sequencing;Postural Facilitation;Compensatory Strategies;Facilitation   ICU Target Mobility Level   ICU Mobility - Targeted Level Level 3A   How much difficulty does the patient currently have...   Turning over in bed (including adjusting bedclothes, sheets and blankets)? 1   Sitting down on and standing up from a chair with arms (e.g., wheelchair, bedside commode, etc.) 1   Moving from lying on back to sitting on  the side of the bed? 1   How much help from another person does the patient currently need...   Moving to and from a bed to a chair (including a wheelchair)? 1   Need to walk in a hospital room? 1   Climbing 3-5 steps with a railing? 1   6 clicks Mobility Score 6   Activity Tolerance   Sitting in Chair NT   Sitting Edge of Bed at least 25 mins   Standing ~1-2 mins total   Comments tolerates well with re: vhemodynamics and Sp02    Short Term Goals    Short Term Goal # 1 Pt will perform supine<>sit with mod A within 6 visits to improve participation in bed mob.   Goal Outcome # 1 goal not met   Short Term Goal # 2 Pt will perform bed<>chair transfer with mod A within 6 visits to increase OOB activity.   Goal Outcome # 2 Goal not met   Education Group   Role of Physical Therapist Patient Response Patient;Acceptance;Explanation;Demonstration;Verbal Demonstration;Action Demonstration

## 2021-06-25 NOTE — PROGRESS NOTES
4 Eyes Skin Assessment Completed by BRISA James and BRISA Willingham.    Head Scab, Bruising and Redness  Ears WDL  Nose Redness and Blanching  Mouth WDL  Neck WDL  Breast/Chest Redness and Blanching  Shoulder Blades Redness and Blanching  Spine Redness and Blanching  (R) Arm/Elbow/Hand Redness, Blanching and Bruising  (L) Arm/Elbow/Hand Redness, Blanching and Bruising  Abdomen Bruising  Groin Redness and Bruising  Scrotum/Coccyx/Buttocks Redness and Blanching  (R) Leg WDL  (L) Leg WDL  (R) Heel/Foot/Toe Redness and Discoloration  (L) Heel/Foot/Toe Redness and Discoloration          Devices In Places Pulse Ox, SCD's, Cortrak, Condom Cath and Nasal Cannula      Interventions In Place Gray Ear Foams, NC W/Ear Foams, Sacral Mepilex, TAP System, Pillows, Elbow Mepilex, Barrier Cream, ZFlo Pillow, Heels Loaded W/Pillows and Pressure Redistribution Mattress    Possible Skin Injury No    Pictures Uploaded Into Epic N/A  Wound Consult Placed Yes  RN Wound Prevention Protocol Ordered Yes

## 2021-06-25 NOTE — PROGRESS NOTES
Updated surrogate decision-maker and carolann Samano on the tentative plan for extubation in the next day or two, and in consideration with his wishes for no prolonged mechanical intubation or life support he would be DNR/DNI and that reintubation would not be attempted and case of respiratory distress, or any other kind of decompensation, comfort care measures would be initiated.   Changed code status to DNR/DNI to reflect this conversation but extubation will be no earlier than tomorrow per Dr. Elam. Oncoming team will be informed.

## 2021-06-25 NOTE — PROGRESS NOTES
Pt arrived to unit. Pt on 4L NC. placed on . Cortak found to be un-taped from nose and out of place. Tube feeding held until new cortrak placed.

## 2021-06-25 NOTE — PROGRESS NOTES
Hospital Medicine Daily Progress Note    Date of Service  6/25/2021    Chief Complaint  70 y.o. male admitted 6/20/2021 with right-sided weakness    Hospital Course    70-year-old male with history of COPD hypertension schizophrenia diabetes presented with right-sided weakness and noted to have distal M1 thrombus for which he underwent thrombectomy.  He required intubation secondary to failure to protect his airway on 6/20/2021.  After discussion with his family elected to proceed with one-way extubation which was performed on 6/25/2021    Interval Problem Update    Patient has productive cough  He is oriented to self  He is unable to participate with review of systems      Consultants/Specialty  Critical care  Neurology  Interventional radiology    Code Status  DNAR/DNI    Disposition  To be determined    Review of Systems  Review of Systems   Unable to perform ROS: Medical condition        Physical Exam  Pulse:  [56-91] 75  Resp:  [13-26] 20  BP: ()/(52-68) 144/65  SpO2:  [92 %-100 %] 97 %    Physical Exam  Vitals and nursing note reviewed.   Constitutional:       Appearance: He is well-developed. He is not diaphoretic.   HENT:      Head: Normocephalic and atraumatic.      Mouth/Throat:      Pharynx: No oropharyngeal exudate.   Eyes:      General: No scleral icterus.        Right eye: No discharge.         Left eye: No discharge.      Conjunctiva/sclera: Conjunctivae normal.      Pupils: Pupils are equal, round, and reactive to light.   Neck:      Vascular: No JVD.      Trachea: No tracheal deviation.   Cardiovascular:      Rate and Rhythm: Normal rate and regular rhythm.      Heart sounds: No murmur heard.   No friction rub. No gallop.    Pulmonary:      Effort: Pulmonary effort is normal. No respiratory distress.      Breath sounds: No stridor. Rhonchi and rales present. No wheezing.   Chest:      Chest wall: No tenderness.   Abdominal:      General: Bowel sounds are normal. There is no distension.       Palpations: Abdomen is soft.      Tenderness: There is no abdominal tenderness. There is no rebound.   Musculoskeletal:         General: No tenderness.      Cervical back: Neck supple.   Skin:     General: Skin is warm and dry.      Nails: There is no clubbing.   Neurological:      Mental Status: He is alert.      Cranial Nerves: No cranial nerve deficit.      Motor: Weakness (Right hemiparesis) present. No abnormal muscle tone.      Comments: Exam limited as patient does not consistently follow commands     Psychiatric:         Behavior: Behavior normal.         Fluids    Intake/Output Summary (Last 24 hours) at 6/25/2021 1422  Last data filed at 6/25/2021 1000  Gross per 24 hour   Intake 1806.81 ml   Output 2005 ml   Net -198.19 ml       Laboratory  Recent Labs     06/23/21  0400 06/24/21  0336 06/25/21  0530   WBC 11.0* 10.2 8.5   RBC 4.44* 4.65* 4.17*   HEMOGLOBIN 14.1 14.8 13.4*   HEMATOCRIT 43.4 45.5 42.0   MCV 97.7 97.8 100.7*   MCH 31.8 31.8 32.1   MCHC 32.5* 32.5* 31.9*   RDW 50.9* 50.2* 51.1*   PLATELETCT 134* 134* 127*   MPV 10.2 10.2 10.2     Recent Labs     06/23/21  0400 06/24/21  0336 06/25/21  0530   SODIUM 144 141 143   POTASSIUM 4.0 4.4 3.9   CHLORIDE 110 109 110   CO2 27 25 26   GLUCOSE 124* 133* 162*   BUN 20 23* 33*   CREATININE 0.86 0.82 0.86   CALCIUM 9.3 9.6 9.7             Recent Labs     06/25/21  0530   TRIGLYCERIDE 95       Imaging  DX-ABDOMEN FOR TUBE PLACEMENT   Final Result      Cortrak feeding tube tip projects in the region of the distal stomach/duodenal bulb.      DX-CHEST-PORTABLE (1 VIEW)   Final Result      Stable chest with hyperinflation and aortic ectasia      MR-BRAIN-W/O   Final Result      1.  Moderate to large area of ischemia in the LEFT frontal and parietal lobes involving the basal ganglia with scattered areas of ischemia in the LEFT posterior temporal and occipital lobes, MCA territory. The area of ischemia is much smaller than the    area of tissue at risks seen on  the perfusion CT.   2.  No acute hemorrhage   3.  Small areas of encephalomalacia in the RIGHT frontal cortex and BILATERAL cerebellum   4.  Atrophy   5.  White matter changes      EC-ECHOCARDIOGRAM COMPLETE W/O CONT   Final Result      CT-HEAD W/O   Final Result      1.  Developing left frontotemporal infarct with associated cortically based low density      2.  Negative for hemorrhage      3.  Underlying cerebral volume loss and white matter change      DX-CHEST-PORTABLE (1 VIEW)   Final Result      No significant change      DX-CHEST-PORTABLE (1 VIEW)   Final Result      Radiographically satisfactory positioning of support hardware and no pneumothorax is seen      DX-ABDOMEN FOR TUBE PLACEMENT   Final Result      Feeding tube extends below the diaphragm with tip at the level of the proximal duodenum. No metallic instruments are identified.      CT-HEAD W/O   Final Result         NO ACUTE ABNORMALITIES ARE NOTED ON CT SCAN OF THE HEAD.      Findings are consistent with atrophy.  Decreased attenuation in the periventricular white matter likely indicates microvascular ischemic disease.      IR-THROMBO MECHANICAL ARTERY,INIT   Final Result         70-year-old patient who presented with acute onset of right-sided upper and lower extremity weakness and slurred speech was found to have a left M1 occlusion. Patient underwent emergent left MCA thrombectomy with complete restoration of flow to the left    MCA as described above. Minimal distal embolization to a distal parietal M4 cortical branches noted.      Final recanalization score: TICI 2C.               DX-CHEST-PORTABLE (1 VIEW)   Final Result      No acute cardiopulmonary findings.      CT-CTA HEAD WITH & W/O-POST PROCESS   Final Result      Thrombus in the distal M1 segment of the left middle cerebral artery. Reconstitution of the M2 middle cerebral artery branches.               Comment: Results discussed with Dr. Ren at approximately 4:35 AM      CT-CTA NECK  WITH & W/O-POST PROCESSING   Final Result      Atherosclerotic disease. Resulting stenosis is less than 50%      CT-CSPINE WITHOUT PLUS RECONS   Final Result      No acute fracture is identified.      CT-CEREBRAL PERFUSION ANALYSIS   Final Result      1.  Cerebral blood flow less than 30% likely representing completed infarct = 60 mL.      2.  T Max more than 6 seconds likely representing combination of completed infarct and ischemia = 231 mL.      3.  Mismatched volume likely representing ischemic brain/penumbra = 171 mL      4.  Please note that the cerebral perfusion was performed on the limited brain tissue around the basal ganglia region. Infarct/ischemia outside the CT perfusion sections can be missed in this study.      CT-HEAD W/O   Final Result      1.  No acute intracranial findings.      2.  Diffuse atrophy and periventricular white matter changes, consistent with chronic small vessel.                    Assessment/Plan  * Acute ischemic stroke (HCC)- (present on admission)  Assessment & Plan  Was not a candidate for alteplase   Underwent left MCA thrombectomy on 6/19/2021 continue aspirin and atorvastatin   PT/OT/SLP  Aspiration precautions  Patient is DNR/DNI with no reintubation per critical care team discussion with his family  Will need cardiac monitoring at discharge depending on his clinical progression        Acute respiratory failure with hypoxia (HCC)- (present on admission)  Assessment & Plan  Intubated for inability to protect airway underwent one-way extubation on 6/25/2021  Aspiration precautions and RT protocol      Type 2 diabetes mellitus, without long-term current use of insulin (HCC)- (present on admission)  Assessment & Plan  Hemoglobin A1c 5.9    Sliding-scale insulin monitor CBGs  Recent Labs     06/23/21  1720 06/23/21  2307 06/24/21  0540 06/24/21  1156 06/24/21  1740 06/25/21  0015 06/25/21  0526 06/25/21  1147   POCGLUCOSE 136* 148* 162* 137* 141* 144* 144* 102*         Hyperlipidemia- (present on admission)  Assessment & Plan  Continue atorvastatin    Hypertension- (present on admission)  Assessment & Plan  Monitor blood pressure and resume home dose of lisinopril if consistently elevated    Hypothyroidism- (present on admission)  Assessment & Plan  Continue levothyroxine    Schizophrenia (HCC)- (present on admission)  Assessment & Plan  Continue Zyprexa and monitor      VTE prophylaxis: Lovenox

## 2021-06-25 NOTE — DISCHARGE PLANNING
[facility] Coordination    Follow up for Rehab.  Extubated to 4L O2 via NC.  Case reviewed with Physiatry Dr. Junior.  Would welcome PT/OT updates for anticipated PMR consult Monday 6/28.  TCC following.  Please reach out sooner if PMR consult requested for medical management.

## 2021-06-25 NOTE — CARE PLAN
Problem: Optimal Care of the Stroke Patient  Goal: Optimal emergency care for the stroke patient  Outcome: Progressing  Goal: Optimal acute care for the stroke patient  Outcome: Progressing     Problem: Knowledge Deficit - Stroke Education  Goal: Patient's knowledge of stroke and risk factors will improve  Outcome: Progressing     Problem: Psychosocial - Patient Condition  Goal: Patient's ability to verbalize feelings about condition will improve  Outcome: Progressing  Goal: Patient's ability to re-evaluate and adapt role responsibilities will improve  Outcome: Progressing     Problem: Discharge Planning - Stroke  Goal: Ensure Stroke Core Measures are met prior to discharge  Outcome: Progressing  Goal: Patient’s continuum of care needs will be met  Outcome: Progressing     Problem: Neuro Status  Goal: Neuro status will remain stable or improve  Outcome: Progressing  Note: Q 4 hour neuro check.      Problem: Hemodynamic Monitoring  Goal: Patient's hemodynamics, fluid balance and neurologic status will be stable or improve  Outcome: Progressing     Problem: Respiratory - Stroke Patient  Goal: Patient will achieve/maintain optimum respiratory rate/effort  Outcome: Progressing     Problem: Dysphagia  Goal: Dysphagia will improve  Outcome: Progressing     Problem: Risk for Aspiration  Goal: Patient's risk for aspiration will be absent or decrease  Outcome: Progressing  Note: Q 15 minute suction head of bed above 30 degree and get pt to edge of bed.     Problem: Urinary Elimination  Goal: Establish and maintain regular urinary output  Outcome: Progressing     Problem: Bowel Elimination  Goal: Establish and maintain regular bowel function  Outcome: Progressing     Problem: Mobility - Stroke  Goal: Patient's capacity to carry out activities will improve  Outcome: Progressing  Goal: Spasticity will be prevented or improved  Outcome: Progressing  Goal: Subluxation will be prevented or improved  Outcome: Progressing      Problem: Self Care  Goal: Patient will have the ability to perform ADLs independently or with assistance (bathe, groom, dress, toilet and feed)  Outcome: Progressing     Problem: Knowledge Deficit - Standard  Goal: Patient and family/care givers will demonstrate understanding of plan of care, disease process/condition, diagnostic tests and medications  Outcome: Progressing     Problem: Pain - Standard  Goal: Alleviation of pain or a reduction in pain to the patient’s comfort goal  Outcome: Progressing     Problem: Skin Integrity  Goal: Skin integrity is maintained or improved  Outcome: Progressing     Problem: Fall Risk  Goal: Patient will remain free from falls  Outcome: Progressing  Note: Bed alarm on   The patient is Stable - Low risk of patient condition declining or worsening    Shift Goals  Clinical Goals: extubation  Patient Goals: able to talk and eat  Family Goals: no family present    Progress made toward(s) clinical / shift goals:  yes    Patient is not progressing towards the following goals:

## 2021-06-25 NOTE — CARE PLAN
Problem: Neuro Status  Goal: Neuro status will remain stable or improve  Outcome: Progressing  NOTE: Q 4 hr neuro checks in place. Patient follows on left side.     Problem: Pain - Standard  Goal: Alleviation of pain or a reduction in pain to the patient’s comfort goal  Outcome: Progressing  NOTE: Pain treated based off CPOT score per MAR.   The patient is Watcher - Medium risk of patient condition declining or worsening

## 2021-06-25 NOTE — PROGRESS NOTES
Report given to BRISA Waldrop. Pt left with transport with his his belongings at 11:20am. Stable condition, no distress.

## 2021-06-26 ENCOUNTER — APPOINTMENT (OUTPATIENT)
Dept: RADIOLOGY | Facility: MEDICAL CENTER | Age: 71
DRG: 023 | End: 2021-06-26
Attending: HOSPITALIST
Payer: MEDICARE

## 2021-06-26 PROBLEM — R68.89 EXCESSIVE ORAL SECRETIONS: Status: ACTIVE | Noted: 2021-06-26

## 2021-06-26 LAB
ANION GAP SERPL CALC-SCNC: 8 MMOL/L (ref 7–16)
BASOPHILS # BLD AUTO: 0.2 % (ref 0–1.8)
BASOPHILS # BLD: 0.02 K/UL (ref 0–0.12)
BUN SERPL-MCNC: 29 MG/DL (ref 8–22)
CALCIUM SERPL-MCNC: 10.1 MG/DL (ref 8.5–10.5)
CHLORIDE SERPL-SCNC: 113 MMOL/L (ref 96–112)
CO2 SERPL-SCNC: 29 MMOL/L (ref 20–33)
CREAT SERPL-MCNC: 0.75 MG/DL (ref 0.5–1.4)
EOSINOPHIL # BLD AUTO: 0.16 K/UL (ref 0–0.51)
EOSINOPHIL NFR BLD: 1.4 % (ref 0–6.9)
ERYTHROCYTE [DISTWIDTH] IN BLOOD BY AUTOMATED COUNT: 46.6 FL (ref 35.9–50)
GLUCOSE BLD-MCNC: 118 MG/DL (ref 65–99)
GLUCOSE BLD-MCNC: 137 MG/DL (ref 65–99)
GLUCOSE BLD-MCNC: 137 MG/DL (ref 65–99)
GLUCOSE BLD-MCNC: 139 MG/DL (ref 65–99)
GLUCOSE SERPL-MCNC: 122 MG/DL (ref 65–99)
HCT VFR BLD AUTO: 47 % (ref 42–52)
HCT VFR BLD AUTO: 48.6 % (ref 42–52)
HCT VFR BLD AUTO: 48.7 % (ref 42–52)
HGB BLD-MCNC: 15.2 G/DL (ref 14–18)
HGB BLD-MCNC: 15.6 G/DL (ref 14–18)
HGB BLD-MCNC: 15.8 G/DL (ref 14–18)
IMM GRANULOCYTES # BLD AUTO: 0.05 K/UL (ref 0–0.11)
IMM GRANULOCYTES NFR BLD AUTO: 0.4 % (ref 0–0.9)
LYMPHOCYTES # BLD AUTO: 1.66 K/UL (ref 1–4.8)
LYMPHOCYTES NFR BLD: 14.3 % (ref 22–41)
MCH RBC QN AUTO: 31.7 PG (ref 27–33)
MCHC RBC AUTO-ENTMCNC: 32.3 G/DL (ref 33.7–35.3)
MCV RBC AUTO: 97.9 FL (ref 81.4–97.8)
MONOCYTES # BLD AUTO: 0.99 K/UL (ref 0–0.85)
MONOCYTES NFR BLD AUTO: 8.5 % (ref 0–13.4)
NEUTROPHILS # BLD AUTO: 8.76 K/UL (ref 1.82–7.42)
NEUTROPHILS NFR BLD: 75.2 % (ref 44–72)
NRBC # BLD AUTO: 0 K/UL
NRBC BLD-RTO: 0 /100 WBC
PLATELET # BLD AUTO: 150 K/UL (ref 164–446)
PMV BLD AUTO: 11.1 FL (ref 9–12.9)
POTASSIUM SERPL-SCNC: 4.1 MMOL/L (ref 3.6–5.5)
PROCALCITONIN SERPL-MCNC: <0.05 NG/ML
RBC # BLD AUTO: 4.8 M/UL (ref 4.7–6.1)
SODIUM SERPL-SCNC: 150 MMOL/L (ref 135–145)
WBC # BLD AUTO: 11.6 K/UL (ref 4.8–10.8)

## 2021-06-26 PROCEDURE — 770006 HCHG ROOM/CARE - MED/SURG/GYN SEMI*

## 2021-06-26 PROCEDURE — 99233 SBSQ HOSP IP/OBS HIGH 50: CPT | Performed by: HOSPITALIST

## 2021-06-26 PROCEDURE — A9270 NON-COVERED ITEM OR SERVICE: HCPCS | Performed by: PSYCHIATRY & NEUROLOGY

## 2021-06-26 PROCEDURE — 700111 HCHG RX REV CODE 636 W/ 250 OVERRIDE (IP): Performed by: STUDENT IN AN ORGANIZED HEALTH CARE EDUCATION/TRAINING PROGRAM

## 2021-06-26 PROCEDURE — 71045 X-RAY EXAM CHEST 1 VIEW: CPT

## 2021-06-26 PROCEDURE — 700102 HCHG RX REV CODE 250 W/ 637 OVERRIDE(OP): Performed by: HOSPITALIST

## 2021-06-26 PROCEDURE — 31720 CLEARANCE OF AIRWAYS: CPT

## 2021-06-26 PROCEDURE — 85018 HEMOGLOBIN: CPT

## 2021-06-26 PROCEDURE — 700102 HCHG RX REV CODE 250 W/ 637 OVERRIDE(OP): Performed by: PSYCHIATRY & NEUROLOGY

## 2021-06-26 PROCEDURE — A9270 NON-COVERED ITEM OR SERVICE: HCPCS | Performed by: INTERNAL MEDICINE

## 2021-06-26 PROCEDURE — 82962 GLUCOSE BLOOD TEST: CPT

## 2021-06-26 PROCEDURE — 85025 COMPLETE CBC W/AUTO DIFF WBC: CPT

## 2021-06-26 PROCEDURE — 80048 BASIC METABOLIC PNL TOTAL CA: CPT

## 2021-06-26 PROCEDURE — 36415 COLL VENOUS BLD VENIPUNCTURE: CPT

## 2021-06-26 PROCEDURE — A9270 NON-COVERED ITEM OR SERVICE: HCPCS | Performed by: HOSPITALIST

## 2021-06-26 PROCEDURE — 84145 PROCALCITONIN (PCT): CPT

## 2021-06-26 PROCEDURE — 700102 HCHG RX REV CODE 250 W/ 637 OVERRIDE(OP): Performed by: INTERNAL MEDICINE

## 2021-06-26 PROCEDURE — 85014 HEMATOCRIT: CPT | Mod: 91

## 2021-06-26 RX ORDER — HYDROMORPHONE HYDROCHLORIDE 1 MG/ML
0.5 INJECTION, SOLUTION INTRAMUSCULAR; INTRAVENOUS; SUBCUTANEOUS EVERY 8 HOURS PRN
Status: DISCONTINUED | OUTPATIENT
Start: 2021-06-26 | End: 2021-06-28

## 2021-06-26 RX ORDER — GLYCOPYRROLATE 1 MG/1
1 TABLET ORAL 3 TIMES DAILY
Status: DISCONTINUED | OUTPATIENT
Start: 2021-06-26 | End: 2021-06-27

## 2021-06-26 RX ADMIN — OLANZAPINE 10 MG: 5 TABLET, FILM COATED ORAL at 18:33

## 2021-06-26 RX ADMIN — THIAMINE HCL TAB 100 MG 100 MG: 100 TAB at 20:38

## 2021-06-26 RX ADMIN — ENOXAPARIN SODIUM 40 MG: 40 INJECTION SUBCUTANEOUS at 05:26

## 2021-06-26 RX ADMIN — LEVOTHYROXINE SODIUM 25 MCG: 0.03 TABLET ORAL at 05:26

## 2021-06-26 RX ADMIN — POLYETHYLENE GLYCOL 3350 1 PACKET: 17 POWDER, FOR SOLUTION ORAL at 17:30

## 2021-06-26 RX ADMIN — DOCUSATE SODIUM 50 MG AND SENNOSIDES 8.6 MG 2 TABLET: 8.6; 5 TABLET, FILM COATED ORAL at 05:25

## 2021-06-26 RX ADMIN — DOCUSATE SODIUM 50 MG AND SENNOSIDES 8.6 MG 2 TABLET: 8.6; 5 TABLET, FILM COATED ORAL at 17:30

## 2021-06-26 RX ADMIN — GLYCOPYRROLATE 1 MG: 1 TABLET ORAL at 17:30

## 2021-06-26 RX ADMIN — ASPIRIN 81 MG: 81 TABLET, CHEWABLE ORAL at 05:25

## 2021-06-26 RX ADMIN — FOLIC ACID 1 MG: 1 TABLET ORAL at 05:25

## 2021-06-26 RX ADMIN — CYANOCOBALAMIN TAB 500 MCG 1000 MCG: 500 TAB at 05:25

## 2021-06-26 RX ADMIN — ATORVASTATIN CALCIUM 80 MG: 80 TABLET, FILM COATED ORAL at 17:30

## 2021-06-26 NOTE — CARE PLAN
The patient is Stable - Low risk of patient condition declining or worsening    Shift Goals  Clinical Goals: Oral care, prevent aspiration   Patient Goals:  (SANA)  Family Goals: no family present    Progress made toward(s) clinical / shift goals: Collaborate with MD and respiratory regarding bloody secretions.  monitoring in place. Continue providing frequent oral care and pulmonary hygiene.     Patient is not progressing towards the following goals: N/A

## 2021-06-26 NOTE — CARE PLAN
The patient is Watcher - Medium risk of patient condition declining or worsening    Shift Goals  Clinical Goals: sleep  Patient Goals: sleep  Family Goals: no family present    Progress made toward(s) clinical / shift goals:  sleeping peacefully throughout night    Patient is not progressing towards the following goals:

## 2021-06-26 NOTE — ASSESSMENT & PLAN NOTE
Patient is noted to have excessive oral secretion, status post excessive suctioning.   Currently patient is noted to have recent oral secretion likely secondary to trauma from suctioning. I discussed the case with intensivist Dr Elam  Who recs monitoring    On 6/27/1021, he is worse occasionally noted to be less bloody, monitor for aspiration  Continue comfort care measure, now actively aspirating

## 2021-06-26 NOTE — HOSPITAL COURSE
This is 70-year-old male with a past medical history significant for COPD, hypertension, schizophrenia, diabetes mitis with hemoglobin A1c of 5.9, hypothyroidism presented to the ER on 6/20/2021 from group home after falling out of his bed and found to have right-sided weakness.  Upon presentation he is noted to be dysarthric.  He was found to have distal M1 thrombus for which he underwent thrombectomy by IR on 6/20/2021 after which he was intubated secondary to airway protection and thus was admitted to the ICU.  Patient was extubated on 6/24/2021 was transferred to medical floor.  During the stay in the hospital cardiology continue to follow the patient currently patient is on aspirin 81 mg p.o. daily, lovastatin 80 mg p.o. daily.  PT/OT has evaluated the patient and recommended postacute, referral placed

## 2021-06-26 NOTE — RESPIRATORY CARE
NT and orally suctioned patient. Suctioned large amounts of blood. RN was at bedside and is aware. I called RN on rapid team to come and take a look at this patient.

## 2021-06-26 NOTE — PROGRESS NOTES
Hospital Medicine Daily Progress Note    Date of Service  6/26/2021    Chief Complaint  70 y.o. male admitted 6/20/2021 with right-sided weakness    Hospital Course  This is 70-year-old male with a past medical history significant for COPD, hypertension, schizophrenia, diabetes mitis with hemoglobin A1c of 5.9, hypothyroidism presented to the ER on 6/20/2021 from group home after falling out of his bed and found to have right-sided weakness.  Upon presentation he is noted to be dysarthric.    He was found to have distal M1 thrombus for which he underwent thrombectomy by IR on 6/20/2021 after which he was intubated secondary to airway protection and thus was admitted to the ICU.    Patient was extubated on 6/24/2021 was transferred to medical floor.  During the stay in the hospital cardiology continue to follow the patient currently patient is on aspirin 81 mg p.o. daily, lovastatin 80 mg p.o. daily.  PT/OT has evaluated the patient and recommended postacute, referral placed.    Interval Problem Update  Patient is noted to have bloody secretion from his mouth I discussed with intensivist Dr. Elam, he stated that it is most likely secondary to trauma due to excessive suctioning and recommended to monitor closely.  --I discussed the plan of care with the patient's niece at bedside who recommended DNI/DNR.  --Palliative was consulted, pending evaluation    --Continue aspiration precaution, fall precaution, seizure precaution. Head of the bed at 35 degree; monitor hemoglobin hematocrit    Consultants/Specialty  Critical care  Neurology  Interventional radiology    Code Status  DNAR/DNI    Disposition  To be determined    Review of Systems  Review of Systems   Unable to perform ROS: Medical condition        Physical Exam  Temp:  [36.2 °C (97.2 °F)-36.8 °C (98.2 °F)] 36.2 °C (97.2 °F)  Pulse:  [61-88] 84  Resp:  [17-18] 18  BP: (135-154)/(61-78) 139/78  SpO2:  [89 %-98 %] 89 %    Physical Exam  Vitals and nursing note  reviewed.   Constitutional:       Appearance: He is well-developed.   HENT:      Head: Normocephalic and atraumatic.      Comments: Oral secretion  Mixed with blood   Eyes:      Pupils: Pupils are equal, round, and reactive to light.   Neck:      Vascular: No JVD.      Trachea: No tracheal deviation.   Cardiovascular:      Rate and Rhythm: Normal rate.   Pulmonary:      Effort: Pulmonary effort is normal.      Breath sounds: Rales present. No rhonchi.   Abdominal:      General: Bowel sounds are normal. There is no distension.      Palpations: Abdomen is soft.   Musculoskeletal:      Cervical back: Neck supple.   Skin:     General: Skin is warm.      Nails: There is no clubbing.   Neurological:      Mental Status: He is alert.      Cranial Nerves: No cranial nerve deficit.      Motor: Weakness (Right hemiparesis) present. No abnormal muscle tone.      Comments: Exam limited as patient does not consistently follow commands           Fluids    Intake/Output Summary (Last 24 hours) at 6/26/2021 1451  Last data filed at 6/26/2021 0409  Gross per 24 hour   Intake 3906 ml   Output 1400 ml   Net 2506 ml       Laboratory  Recent Labs     06/24/21  0336 06/25/21  0530 06/26/21  0153   WBC 10.2 8.5 11.6*   RBC 4.65* 4.17* 4.80   HEMOGLOBIN 14.8 13.4* 15.2   HEMATOCRIT 45.5 42.0 47.0   MCV 97.8 100.7* 97.9*   MCH 31.8 32.1 31.7   MCHC 32.5* 31.9* 32.3*   RDW 50.2* 51.1* 46.6   PLATELETCT 134* 127* 150*   MPV 10.2 10.2 11.1     Recent Labs     06/24/21  0336 06/25/21  0530 06/26/21  0153   SODIUM 141 143 150*   POTASSIUM 4.4 3.9 4.1   CHLORIDE 109 110 113*   CO2 25 26 29   GLUCOSE 133* 162* 122*   BUN 23* 33* 29*   CREATININE 0.82 0.86 0.75   CALCIUM 9.6 9.7 10.1             Recent Labs     06/25/21  0530   TRIGLYCERIDE 95       Imaging  DX-CHEST-LIMITED (1 VIEW)   Final Result      1.  There is similar interstitial opacity in the lung bases possibly due to chronic scarring.   2.  There is no acute pneumonia or failure.       DX-ABDOMEN FOR TUBE PLACEMENT   Final Result      Cortrak feeding tube tip projects in the region of the distal stomach/duodenal bulb.      DX-CHEST-PORTABLE (1 VIEW)   Final Result      Stable chest with hyperinflation and aortic ectasia      MR-BRAIN-W/O   Final Result      1.  Moderate to large area of ischemia in the LEFT frontal and parietal lobes involving the basal ganglia with scattered areas of ischemia in the LEFT posterior temporal and occipital lobes, MCA territory. The area of ischemia is much smaller than the    area of tissue at risks seen on the perfusion CT.   2.  No acute hemorrhage   3.  Small areas of encephalomalacia in the RIGHT frontal cortex and BILATERAL cerebellum   4.  Atrophy   5.  White matter changes      EC-ECHOCARDIOGRAM COMPLETE W/O CONT   Final Result      CT-HEAD W/O   Final Result      1.  Developing left frontotemporal infarct with associated cortically based low density      2.  Negative for hemorrhage      3.  Underlying cerebral volume loss and white matter change      DX-CHEST-PORTABLE (1 VIEW)   Final Result      No significant change      DX-CHEST-PORTABLE (1 VIEW)   Final Result      Radiographically satisfactory positioning of support hardware and no pneumothorax is seen      DX-ABDOMEN FOR TUBE PLACEMENT   Final Result      Feeding tube extends below the diaphragm with tip at the level of the proximal duodenum. No metallic instruments are identified.      CT-HEAD W/O   Final Result         NO ACUTE ABNORMALITIES ARE NOTED ON CT SCAN OF THE HEAD.      Findings are consistent with atrophy.  Decreased attenuation in the periventricular white matter likely indicates microvascular ischemic disease.      IR-THROMBO MECHANICAL ARTERY,INIT   Final Result         70-year-old patient who presented with acute onset of right-sided upper and lower extremity weakness and slurred speech was found to have a left M1 occlusion. Patient underwent emergent left MCA thrombectomy with  complete restoration of flow to the left    MCA as described above. Minimal distal embolization to a distal parietal M4 cortical branches noted.      Final recanalization score: TICI 2C.               DX-CHEST-PORTABLE (1 VIEW)   Final Result      No acute cardiopulmonary findings.      CT-CTA HEAD WITH & W/O-POST PROCESS   Final Result      Thrombus in the distal M1 segment of the left middle cerebral artery. Reconstitution of the M2 middle cerebral artery branches.               Comment: Results discussed with Dr. Ren at approximately 4:35 AM      CT-CTA NECK WITH & W/O-POST PROCESSING   Final Result      Atherosclerotic disease. Resulting stenosis is less than 50%      CT-CSPINE WITHOUT PLUS RECONS   Final Result      No acute fracture is identified.      CT-CEREBRAL PERFUSION ANALYSIS   Final Result      1.  Cerebral blood flow less than 30% likely representing completed infarct = 60 mL.      2.  T Max more than 6 seconds likely representing combination of completed infarct and ischemia = 231 mL.      3.  Mismatched volume likely representing ischemic brain/penumbra = 171 mL      4.  Please note that the cerebral perfusion was performed on the limited brain tissue around the basal ganglia region. Infarct/ischemia outside the CT perfusion sections can be missed in this study.      CT-HEAD W/O   Final Result      1.  No acute intracranial findings.      2.  Diffuse atrophy and periventricular white matter changes, consistent with chronic small vessel.                    Assessment/Plan    * Acute ischemic stroke (HCC)  Assessment & Plan  Was not a candidate for alteplase    ---Underwent left MCA thrombectomy on 6/19/2021 continue aspirin and atorvastatin    --PT/OT/SLP , continue Aspiration precautions  Patient is DNR/DNI with no reintubation  : Discussed by critical care intensivist and myself with the patient family      Excessive oral secretions  Assessment & Plan  Patient is noted to have excessive oral  secretion, status post excessive suctioning.   Currently patient is noted to have recent oral secretion likely secondary to trauma from suctioning. I discussed the case with intensivist Dr Elam  Who recs monitoring      Acute respiratory failure with hypoxia (HCC)  Assessment & Plan  Intubated for inability to protect airway underwent one-way extubation on 6/25/2021   --Aspiration precautions and RT protocol   -- On 4-5 L       Type 2 diabetes mellitus, without long-term current use of insulin ([facility])  Assessment & Plan  Hemoglobin A1c 5.9   --ISS and hypoglycemia protocol    Hyperlipidemia  Assessment & Plan  Continue atorvastatin    Hypertension  Assessment & Plan  Monitor blood pressure and resume home dose of lisinopril if consistently elevated    Hypothyroidism  Assessment & Plan  Continue levothyroxine    Schizophrenia ([facility])  Assessment & Plan  Continue Zyprexa and monitor  Qtc at 418        VTE prophylaxis: stop lovenox as patient is having

## 2021-06-27 LAB
ANION GAP SERPL CALC-SCNC: 10 MMOL/L (ref 7–16)
BASOPHILS # BLD AUTO: 0.3 % (ref 0–1.8)
BASOPHILS # BLD: 0.04 K/UL (ref 0–0.12)
BUN SERPL-MCNC: 36 MG/DL (ref 8–22)
CALCIUM SERPL-MCNC: 10.6 MG/DL (ref 8.5–10.5)
CHLORIDE SERPL-SCNC: 112 MMOL/L (ref 96–112)
CO2 SERPL-SCNC: 25 MMOL/L (ref 20–33)
CREAT SERPL-MCNC: 0.78 MG/DL (ref 0.5–1.4)
EOSINOPHIL # BLD AUTO: 0.06 K/UL (ref 0–0.51)
EOSINOPHIL NFR BLD: 0.5 % (ref 0–6.9)
ERYTHROCYTE [DISTWIDTH] IN BLOOD BY AUTOMATED COUNT: 46.4 FL (ref 35.9–50)
GLUCOSE BLD-MCNC: 128 MG/DL (ref 65–99)
GLUCOSE BLD-MCNC: 159 MG/DL (ref 65–99)
GLUCOSE BLD-MCNC: 160 MG/DL (ref 65–99)
GLUCOSE BLD-MCNC: 176 MG/DL (ref 65–99)
GLUCOSE SERPL-MCNC: 159 MG/DL (ref 65–99)
HCT VFR BLD AUTO: 48.4 % (ref 42–52)
HCT VFR BLD AUTO: 50.7 % (ref 42–52)
HCT VFR BLD AUTO: 50.7 % (ref 42–52)
HCT VFR BLD AUTO: 52.5 % (ref 42–52)
HGB BLD-MCNC: 15.8 G/DL (ref 14–18)
HGB BLD-MCNC: 16.4 G/DL (ref 14–18)
HGB BLD-MCNC: 16.7 G/DL (ref 14–18)
HGB BLD-MCNC: 17.4 G/DL (ref 14–18)
IMM GRANULOCYTES # BLD AUTO: 0.06 K/UL (ref 0–0.11)
IMM GRANULOCYTES NFR BLD AUTO: 0.5 % (ref 0–0.9)
LYMPHOCYTES # BLD AUTO: 2.04 K/UL (ref 1–4.8)
LYMPHOCYTES NFR BLD: 16.3 % (ref 22–41)
MCH RBC QN AUTO: 32.1 PG (ref 27–33)
MCHC RBC AUTO-ENTMCNC: 32.6 G/DL (ref 33.7–35.3)
MCV RBC AUTO: 98.4 FL (ref 81.4–97.8)
MONOCYTES # BLD AUTO: 1.08 K/UL (ref 0–0.85)
MONOCYTES NFR BLD AUTO: 8.6 % (ref 0–13.4)
NEUTROPHILS # BLD AUTO: 9.22 K/UL (ref 1.82–7.42)
NEUTROPHILS NFR BLD: 73.8 % (ref 44–72)
NRBC # BLD AUTO: 0 K/UL
NRBC BLD-RTO: 0 /100 WBC
PLATELET # BLD AUTO: 170 K/UL (ref 164–446)
PMV BLD AUTO: 11 FL (ref 9–12.9)
POTASSIUM SERPL-SCNC: 3.9 MMOL/L (ref 3.6–5.5)
PREALB SERPL-MCNC: 21.5 MG/DL (ref 18–38)
RBC # BLD AUTO: 4.92 M/UL (ref 4.7–6.1)
SODIUM SERPL-SCNC: 147 MMOL/L (ref 135–145)
WBC # BLD AUTO: 12.5 K/UL (ref 4.8–10.8)

## 2021-06-27 PROCEDURE — 700102 HCHG RX REV CODE 250 W/ 637 OVERRIDE(OP): Performed by: HOSPITALIST

## 2021-06-27 PROCEDURE — 36415 COLL VENOUS BLD VENIPUNCTURE: CPT

## 2021-06-27 PROCEDURE — 84134 ASSAY OF PREALBUMIN: CPT

## 2021-06-27 PROCEDURE — 99233 SBSQ HOSP IP/OBS HIGH 50: CPT | Performed by: HOSPITALIST

## 2021-06-27 PROCEDURE — 80048 BASIC METABOLIC PNL TOTAL CA: CPT

## 2021-06-27 PROCEDURE — A9270 NON-COVERED ITEM OR SERVICE: HCPCS | Performed by: HOSPITALIST

## 2021-06-27 PROCEDURE — 700102 HCHG RX REV CODE 250 W/ 637 OVERRIDE(OP): Performed by: INTERNAL MEDICINE

## 2021-06-27 PROCEDURE — 82962 GLUCOSE BLOOD TEST: CPT | Mod: 91

## 2021-06-27 PROCEDURE — 85025 COMPLETE CBC W/AUTO DIFF WBC: CPT

## 2021-06-27 PROCEDURE — A9270 NON-COVERED ITEM OR SERVICE: HCPCS | Performed by: INTERNAL MEDICINE

## 2021-06-27 PROCEDURE — A9270 NON-COVERED ITEM OR SERVICE: HCPCS | Performed by: PSYCHIATRY & NEUROLOGY

## 2021-06-27 PROCEDURE — 85014 HEMATOCRIT: CPT

## 2021-06-27 PROCEDURE — 700102 HCHG RX REV CODE 250 W/ 637 OVERRIDE(OP): Performed by: PSYCHIATRY & NEUROLOGY

## 2021-06-27 PROCEDURE — 770006 HCHG ROOM/CARE - MED/SURG/GYN SEMI*

## 2021-06-27 PROCEDURE — 85018 HEMOGLOBIN: CPT

## 2021-06-27 RX ORDER — GLYCOPYRROLATE 1 MG/1
1 TABLET ORAL 3 TIMES DAILY
Status: DISCONTINUED | OUTPATIENT
Start: 2021-06-27 | End: 2021-06-28

## 2021-06-27 RX ADMIN — FOLIC ACID 1 MG: 1 TABLET ORAL at 04:45

## 2021-06-27 RX ADMIN — INSULIN HUMAN 1 UNITS: 100 INJECTION, SOLUTION PARENTERAL at 18:29

## 2021-06-27 RX ADMIN — CYANOCOBALAMIN TAB 500 MCG 1000 MCG: 500 TAB at 04:45

## 2021-06-27 RX ADMIN — INSULIN HUMAN 1 UNITS: 100 INJECTION, SOLUTION PARENTERAL at 12:12

## 2021-06-27 RX ADMIN — GLYCOPYRROLATE 1 MG: 1 TABLET ORAL at 04:45

## 2021-06-27 RX ADMIN — GLYCOPYRROLATE 1 MG: 1 TABLET ORAL at 18:24

## 2021-06-27 RX ADMIN — ATORVASTATIN CALCIUM 80 MG: 80 TABLET, FILM COATED ORAL at 18:24

## 2021-06-27 RX ADMIN — DOCUSATE SODIUM 50 MG AND SENNOSIDES 8.6 MG 2 TABLET: 8.6; 5 TABLET, FILM COATED ORAL at 04:45

## 2021-06-27 RX ADMIN — INSULIN HUMAN 1 UNITS: 100 INJECTION, SOLUTION PARENTERAL at 06:27

## 2021-06-27 RX ADMIN — MAGNESIUM HYDROXIDE 30 ML: 400 SUSPENSION ORAL at 18:24

## 2021-06-27 RX ADMIN — ASPIRIN 81 MG: 81 TABLET, CHEWABLE ORAL at 04:45

## 2021-06-27 RX ADMIN — THIAMINE HCL TAB 100 MG 100 MG: 100 TAB at 22:05

## 2021-06-27 RX ADMIN — GLYCOPYRROLATE 1 MG: 1 TABLET ORAL at 12:09

## 2021-06-27 RX ADMIN — DOCUSATE SODIUM 50 MG AND SENNOSIDES 8.6 MG 2 TABLET: 8.6; 5 TABLET, FILM COATED ORAL at 18:24

## 2021-06-27 RX ADMIN — OLANZAPINE 10 MG: 5 TABLET, FILM COATED ORAL at 18:24

## 2021-06-27 RX ADMIN — LEVOTHYROXINE SODIUM 25 MCG: 0.03 TABLET ORAL at 04:44

## 2021-06-27 NOTE — CARE PLAN
The patient is Stable - Low risk of patient condition declining or worsening    Shift Goals  Clinical Goals: Oral care, prevent aspiration   Patient Goals: SANA  Family Goals: no family present    Progress made toward(s) clinical / shift goals:  Collaborate with care team regarding bloody oral secretions. Continue frequent oral care and pulmonary hygiene.     Patient is not progressing towards the following goals: N/A

## 2021-06-27 NOTE — CARE PLAN
The patient is Watcher - Medium risk of patient condition declining or worsening    Shift Goals  Clinical Goals: comfort, prevent aspiration  Patient Goals: comfort  Family Goals: no family present    Progress made toward(s) clinical / shift goals:  Q2 turns for skin integrity. Suctioning when needed to prevent aspiration     Patient is not progressing towards the following goals:

## 2021-06-27 NOTE — PROGRESS NOTES
Hospital Medicine Daily Progress Note    Date of Service  6/27/2021    Chief Complaint  70 y.o. male admitted 6/20/2021 with right-sided weakness    Hospital Course  This is 70-year-old male with a past medical history significant for COPD, hypertension, schizophrenia, diabetes mitis with hemoglobin A1c of 5.9, hypothyroidism presented to the ER on 6/20/2021 from group home after falling out of his bed and found to have right-sided weakness.  Upon presentation he is noted to be dysarthric.    He was found to have distal M1 thrombus for which he underwent thrombectomy by IR on 6/20/2021 after which he was intubated secondary to airway protection and thus was admitted to the ICU.    Patient was extubated on 6/24/2021 was transferred to medical floor.  During the stay in the hospital cardiology continue to follow the patient currently patient is on aspirin 81 mg p.o. daily, lovastatin 80 mg p.o. daily.  PT/OT has evaluated the patient and recommended postacute, referral placed.    Interval Problem Update  Patient is noted to have bloody secretion from his mouth I discussed with intensivist Dr. Elam, he stated that it is most likely secondary to trauma due to excessive suctioning and recommended to monitor closely.  --I discussed the plan of care with the patient's niece at bedside who recommended DNI/DNR.  --Palliative was consulted, pending evaluation    --Continue aspiration precaution, fall precaution, seizure precaution. Head of the bed at 35 degree; monitor hemoglobin hematocrit    6/27:  --No acute events overnight.  Patient is noted to have mild bloody secretions coming out from oral cavity while suctioning.  Hemoglobin hematocrit is stable.  I called patient's niece at [phone], left voice message, unable to get a hold of the patient family member  --Palliative called regarding goals of care discussion  --Vital signs are stable, currently on 4 L of oxygen, very high risk of aspiration, continue  aspiration precaution   -Sodium improving at 147, hemoglobin hematocrit has only stable   -Continue free water flushes at 200 every 6 hours-    Consultants/Specialty  Critical care  Neurology  Interventional radiology    Code Status  DNAR/DNI    Disposition  To be determined    Review of Systems  Review of Systems   Unable to perform ROS: Medical condition        Physical Exam  Temp:  [36.2 °C (97.1 °F)-36.9 °C (98.4 °F)] 36.9 °C (98.4 °F)  Pulse:  [] 100  Resp:  [17-18] 18  BP: (142-160)/() 151/105  SpO2:  [9 %-94 %] 94 %    Physical Exam  Vitals and nursing note reviewed.   Constitutional:       Appearance: He is well-developed.   HENT:      Head: Normocephalic and atraumatic.      Comments: Oral secretion  Mixed with blood   Does have  cortrak in place  Eyes:      Pupils: Pupils are equal, round, and reactive to light.   Neck:      Vascular: No JVD.      Trachea: No tracheal deviation.   Cardiovascular:      Rate and Rhythm: Normal rate.   Pulmonary:      Effort: Pulmonary effort is normal.      Breath sounds: Rales present. No rhonchi.   Abdominal:      General: Bowel sounds are normal. There is no distension.      Palpations: Abdomen is soft.   Musculoskeletal:      Cervical back: Neck supple.   Skin:     General: Skin is warm.      Nails: There is no clubbing.   Neurological:      Mental Status: He is alert.      Cranial Nerves: No cranial nerve deficit.      Motor: Weakness (Right hemiparesis) present. No abnormal muscle tone.      Comments: Exam limited as patient does not consistently follow commands           Fluids    Intake/Output Summary (Last 24 hours) at 6/27/2021 1534  Last data filed at 6/27/2021 1223  Gross per 24 hour   Intake 800 ml   Output 1800 ml   Net -1000 ml       Laboratory  Recent Labs     06/25/21  0530 06/25/21  0530 06/26/21  0153 06/26/21  1529 06/27/21  0156 06/27/21  0832 06/27/21  1454   WBC 8.5  --  11.6*  --  12.5*  --   --    RBC 4.17*  --  4.80  --  4.92  --   --     HEMOGLOBIN 13.4*   < > 15.2   < > 15.8 16.4 16.7   HEMATOCRIT 42.0   < > 47.0   < > 48.4 50.7 50.7   .7*  --  97.9*  --  98.4*  --   --    MCH 32.1  --  31.7  --  32.1  --   --    MCHC 31.9*  --  32.3*  --  32.6*  --   --    RDW 51.1*  --  46.6  --  46.4  --   --    PLATELETCT 127*  --  150*  --  170  --   --    MPV 10.2  --  11.1  --  11.0  --   --     < > = values in this interval not displayed.     Recent Labs     06/25/21  0530 06/26/21  0153 06/27/21  0156   SODIUM 143 150* 147*   POTASSIUM 3.9 4.1 3.9   CHLORIDE 110 113* 112   CO2 26 29 25   GLUCOSE 162* 122* 159*   BUN 33* 29* 36*   CREATININE 0.86 0.75 0.78   CALCIUM 9.7 10.1 10.6*             Recent Labs     06/25/21  0530   TRIGLYCERIDE 95       Imaging  DX-CHEST-LIMITED (1 VIEW)   Final Result      1.  There is similar interstitial opacity in the lung bases possibly due to chronic scarring.   2.  There is no acute pneumonia or failure.      DX-ABDOMEN FOR TUBE PLACEMENT   Final Result      Cortrak feeding tube tip projects in the region of the distal stomach/duodenal bulb.      DX-CHEST-PORTABLE (1 VIEW)   Final Result      Stable chest with hyperinflation and aortic ectasia      MR-BRAIN-W/O   Final Result      1.  Moderate to large area of ischemia in the LEFT frontal and parietal lobes involving the basal ganglia with scattered areas of ischemia in the LEFT posterior temporal and occipital lobes, MCA territory. The area of ischemia is much smaller than the    area of tissue at risks seen on the perfusion CT.   2.  No acute hemorrhage   3.  Small areas of encephalomalacia in the RIGHT frontal cortex and BILATERAL cerebellum   4.  Atrophy   5.  White matter changes      EC-ECHOCARDIOGRAM COMPLETE W/O CONT   Final Result      CT-HEAD W/O   Final Result      1.  Developing left frontotemporal infarct with associated cortically based low density      2.  Negative for hemorrhage      3.  Underlying cerebral volume loss and white matter change       DX-CHEST-PORTABLE (1 VIEW)   Final Result      No significant change      DX-CHEST-PORTABLE (1 VIEW)   Final Result      Radiographically satisfactory positioning of support hardware and no pneumothorax is seen      DX-ABDOMEN FOR TUBE PLACEMENT   Final Result      Feeding tube extends below the diaphragm with tip at the level of the proximal duodenum. No metallic instruments are identified.      CT-HEAD W/O   Final Result         NO ACUTE ABNORMALITIES ARE NOTED ON CT SCAN OF THE HEAD.      Findings are consistent with atrophy.  Decreased attenuation in the periventricular white matter likely indicates microvascular ischemic disease.      IR-THROMBO MECHANICAL ARTERY,INIT   Final Result         70-year-old patient who presented with acute onset of right-sided upper and lower extremity weakness and slurred speech was found to have a left M1 occlusion. Patient underwent emergent left MCA thrombectomy with complete restoration of flow to the left    MCA as described above. Minimal distal embolization to a distal parietal M4 cortical branches noted.      Final recanalization score: TICI 2C.               DX-CHEST-PORTABLE (1 VIEW)   Final Result      No acute cardiopulmonary findings.      CT-CTA HEAD WITH & W/O-POST PROCESS   Final Result      Thrombus in the distal M1 segment of the left middle cerebral artery. Reconstitution of the M2 middle cerebral artery branches.               Comment: Results discussed with Dr. Ren at approximately 4:35 AM      CT-CTA NECK WITH & W/O-POST PROCESSING   Final Result      Atherosclerotic disease. Resulting stenosis is less than 50%      CT-CSPINE WITHOUT PLUS RECONS   Final Result      No acute fracture is identified.      CT-CEREBRAL PERFUSION ANALYSIS   Final Result      1.  Cerebral blood flow less than 30% likely representing completed infarct = 60 mL.      2.  T Max more than 6 seconds likely representing combination of completed infarct and ischemia = 231 mL.      3.   Mismatched volume likely representing ischemic brain/penumbra = 171 mL      4.  Please note that the cerebral perfusion was performed on the limited brain tissue around the basal ganglia region. Infarct/ischemia outside the CT perfusion sections can be missed in this study.      CT-HEAD W/O   Final Result      1.  No acute intracranial findings.      2.  Diffuse atrophy and periventricular white matter changes, consistent with chronic small vessel.                    Assessment/Plan    * Acute ischemic stroke (HCC)  Assessment & Plan  Was not a candidate for alteplase    ---Underwent left MCA thrombectomy on 6/19/2021 continue aspirin and atorvastatin    --PT/OT/SLP , continue Aspiration precautions  Patient is DNR/DNI with no reintubation  : Discussed by critical care intensivist and myself with the patient family   Continue asa/statin      Excessive oral secretions  Assessment & Plan  Patient is noted to have excessive oral secretion, status post excessive suctioning.   Currently patient is noted to have recent oral secretion likely secondary to trauma from suctioning. I discussed the case with intensivist Dr Elam  Who recs monitoring    On 6/27/1021, he is worse occasionally noted to be less bloody, monitor for aspiration      Acute respiratory failure with hypoxia (HCC)  Assessment & Plan  Intubated for inability to protect airway underwent one-way extubation on 6/25/2021   --Aspiration precautions and RT protocol   -- On 4-5 L     Need aggressive pulmonary toileting, RT following      Type 2 diabetes mellitus, without long-term current use of insulin (HCC)  Assessment & Plan  Hemoglobin A1c 5.9   --ISS and hypoglycemia protocol    Hyperlipidemia  Assessment & Plan  Continue atorvastatin    Hypertension  Assessment & Plan  Monitor blood pressure    currently his blood pressure is noted to be well controlled, monitor    Hypothyroidism  Assessment & Plan  Continue levothyroxine    Schizophrenia  (HCC)  Assessment & Plan  Continue Zyprexa and monitor  Qtc at 418, monitor      VTE prophylaxis: stop lovenox as patient is having

## 2021-06-28 ENCOUNTER — HOSPITAL ENCOUNTER (INPATIENT)
Facility: REHABILITATION | Age: 71
End: 2021-06-28
Attending: PHYSICAL MEDICINE & REHABILITATION | Admitting: PHYSICAL MEDICINE & REHABILITATION
Payer: MEDICARE

## 2021-06-28 ENCOUNTER — HOME CARE VISIT (OUTPATIENT)
Dept: HOSPICE | Facility: HOSPICE | Age: 71
End: 2021-06-28
Payer: MEDICARE

## 2021-06-28 ENCOUNTER — HOSPICE ADMISSION (OUTPATIENT)
Dept: HOSPICE | Facility: HOSPICE | Age: 71
End: 2021-06-28
Payer: MEDICARE

## 2021-06-28 PROBLEM — E87.0 HYPERNATREMIA: Status: ACTIVE | Noted: 2021-06-28

## 2021-06-28 PROBLEM — Z71.89 ADVANCE CARE PLANNING: Status: ACTIVE | Noted: 2021-06-28

## 2021-06-28 LAB
ALBUMIN SERPL BCP-MCNC: 4 G/DL (ref 3.2–4.9)
ALBUMIN/GLOB SERPL: 1.1 G/DL
ALP SERPL-CCNC: 101 U/L (ref 30–99)
ALT SERPL-CCNC: 43 U/L (ref 2–50)
ANION GAP SERPL CALC-SCNC: 10 MMOL/L (ref 7–16)
AST SERPL-CCNC: 43 U/L (ref 12–45)
BASOPHILS # BLD AUTO: 0.2 % (ref 0–1.8)
BASOPHILS # BLD: 0.04 K/UL (ref 0–0.12)
BILIRUB SERPL-MCNC: 0.5 MG/DL (ref 0.1–1.5)
BUN SERPL-MCNC: 41 MG/DL (ref 8–22)
CALCIUM SERPL-MCNC: 10.8 MG/DL (ref 8.5–10.5)
CHLORIDE SERPL-SCNC: 112 MMOL/L (ref 96–112)
CO2 SERPL-SCNC: 27 MMOL/L (ref 20–33)
CREAT SERPL-MCNC: 0.83 MG/DL (ref 0.5–1.4)
CRP SERPL HS-MCNC: 12.6 MG/DL (ref 0–0.75)
EOSINOPHIL # BLD AUTO: 0.01 K/UL (ref 0–0.51)
EOSINOPHIL NFR BLD: 0.1 % (ref 0–6.9)
ERYTHROCYTE [DISTWIDTH] IN BLOOD BY AUTOMATED COUNT: 46.7 FL (ref 35.9–50)
GLOBULIN SER CALC-MCNC: 3.7 G/DL (ref 1.9–3.5)
GLUCOSE BLD-MCNC: 158 MG/DL (ref 65–99)
GLUCOSE BLD-MCNC: 175 MG/DL (ref 65–99)
GLUCOSE SERPL-MCNC: 174 MG/DL (ref 65–99)
HCT VFR BLD AUTO: 51.8 % (ref 42–52)
HCT VFR BLD AUTO: 51.8 % (ref 42–52)
HCT VFR BLD AUTO: 52.4 % (ref 42–52)
HCT VFR BLD AUTO: 52.4 % (ref 42–52)
HGB BLD-MCNC: 16.8 G/DL (ref 14–18)
HGB BLD-MCNC: 16.9 G/DL (ref 14–18)
HGB BLD-MCNC: 17 G/DL (ref 14–18)
HGB BLD-MCNC: 17.2 G/DL (ref 14–18)
IMM GRANULOCYTES # BLD AUTO: 0.1 K/UL (ref 0–0.11)
IMM GRANULOCYTES NFR BLD AUTO: 0.6 % (ref 0–0.9)
LYMPHOCYTES # BLD AUTO: 1.89 K/UL (ref 1–4.8)
LYMPHOCYTES NFR BLD: 11.1 % (ref 22–41)
MAGNESIUM SERPL-MCNC: 2.6 MG/DL (ref 1.5–2.5)
MCH RBC QN AUTO: 31.7 PG (ref 27–33)
MCHC RBC AUTO-ENTMCNC: 32.6 G/DL (ref 33.7–35.3)
MCV RBC AUTO: 97.2 FL (ref 81.4–97.8)
MONOCYTES # BLD AUTO: 1.63 K/UL (ref 0–0.85)
MONOCYTES NFR BLD AUTO: 9.6 % (ref 0–13.4)
NEUTROPHILS # BLD AUTO: 13.39 K/UL (ref 1.82–7.42)
NEUTROPHILS NFR BLD: 78.4 % (ref 44–72)
NRBC # BLD AUTO: 0 K/UL
NRBC BLD-RTO: 0 /100 WBC
PHOSPHATE SERPL-MCNC: 3.2 MG/DL (ref 2.5–4.5)
PLATELET # BLD AUTO: 197 K/UL (ref 164–446)
PMV BLD AUTO: 10.9 FL (ref 9–12.9)
POTASSIUM SERPL-SCNC: 4 MMOL/L (ref 3.6–5.5)
PROT SERPL-MCNC: 7.7 G/DL (ref 6–8.2)
RBC # BLD AUTO: 5.33 M/UL (ref 4.7–6.1)
SODIUM SERPL-SCNC: 149 MMOL/L (ref 135–145)
WBC # BLD AUTO: 17.1 K/UL (ref 4.8–10.8)

## 2021-06-28 PROCEDURE — 700102 HCHG RX REV CODE 250 W/ 637 OVERRIDE(OP): Performed by: HOSPITALIST

## 2021-06-28 PROCEDURE — 83735 ASSAY OF MAGNESIUM: CPT

## 2021-06-28 PROCEDURE — 82962 GLUCOSE BLOOD TEST: CPT

## 2021-06-28 PROCEDURE — 92526 ORAL FUNCTION THERAPY: CPT

## 2021-06-28 PROCEDURE — 36415 COLL VENOUS BLD VENIPUNCTURE: CPT

## 2021-06-28 PROCEDURE — 85018 HEMOGLOBIN: CPT | Mod: 91

## 2021-06-28 PROCEDURE — 86140 C-REACTIVE PROTEIN: CPT

## 2021-06-28 PROCEDURE — 700102 HCHG RX REV CODE 250 W/ 637 OVERRIDE(OP): Performed by: INTERNAL MEDICINE

## 2021-06-28 PROCEDURE — A9270 NON-COVERED ITEM OR SERVICE: HCPCS | Performed by: INTERNAL MEDICINE

## 2021-06-28 PROCEDURE — 99233 SBSQ HOSP IP/OBS HIGH 50: CPT | Mod: 25 | Performed by: HOSPITALIST

## 2021-06-28 PROCEDURE — 85025 COMPLETE CBC W/AUTO DIFF WBC: CPT

## 2021-06-28 PROCEDURE — A9270 NON-COVERED ITEM OR SERVICE: HCPCS | Performed by: HOSPITALIST

## 2021-06-28 PROCEDURE — 99223 1ST HOSP IP/OBS HIGH 75: CPT | Performed by: PHYSICAL MEDICINE & REHABILITATION

## 2021-06-28 PROCEDURE — 84100 ASSAY OF PHOSPHORUS: CPT

## 2021-06-28 PROCEDURE — 80053 COMPREHEN METABOLIC PANEL: CPT

## 2021-06-28 PROCEDURE — 99497 ADVNCD CARE PLAN 30 MIN: CPT | Performed by: HOSPITALIST

## 2021-06-28 PROCEDURE — 85014 HEMATOCRIT: CPT

## 2021-06-28 PROCEDURE — 770006 HCHG ROOM/CARE - MED/SURG/GYN SEMI*

## 2021-06-28 RX ORDER — BISACODYL 10 MG
10 SUPPOSITORY, RECTAL RECTAL
Status: DISCONTINUED | OUTPATIENT
Start: 2021-06-28 | End: 2021-06-30 | Stop reason: HOSPADM

## 2021-06-28 RX ORDER — LORAZEPAM 2 MG/ML
1 INJECTION INTRAMUSCULAR
Status: DISCONTINUED | OUTPATIENT
Start: 2021-06-28 | End: 2021-06-30 | Stop reason: HOSPADM

## 2021-06-28 RX ORDER — ONDANSETRON 4 MG/1
8 TABLET, ORALLY DISINTEGRATING ORAL EVERY 8 HOURS PRN
Status: DISCONTINUED | OUTPATIENT
Start: 2021-06-28 | End: 2021-06-30 | Stop reason: HOSPADM

## 2021-06-28 RX ORDER — LORAZEPAM 2 MG/ML
1 CONCENTRATE ORAL
Status: DISCONTINUED | OUTPATIENT
Start: 2021-06-28 | End: 2021-06-30 | Stop reason: HOSPADM

## 2021-06-28 RX ORDER — MORPHINE SULFATE 100 MG/5ML
20 SOLUTION ORAL
Status: DISCONTINUED | OUTPATIENT
Start: 2021-06-28 | End: 2021-06-30 | Stop reason: HOSPADM

## 2021-06-28 RX ORDER — MORPHINE SULFATE 100 MG/5ML
10 SOLUTION ORAL
Status: DISCONTINUED | OUTPATIENT
Start: 2021-06-28 | End: 2021-06-30 | Stop reason: HOSPADM

## 2021-06-28 RX ORDER — ONDANSETRON 2 MG/ML
8 INJECTION INTRAMUSCULAR; INTRAVENOUS EVERY 8 HOURS PRN
Status: DISCONTINUED | OUTPATIENT
Start: 2021-06-28 | End: 2021-06-30 | Stop reason: HOSPADM

## 2021-06-28 RX ORDER — ACETAMINOPHEN 325 MG/1
650 TABLET ORAL EVERY 4 HOURS PRN
Status: DISCONTINUED | OUTPATIENT
Start: 2021-06-28 | End: 2021-06-30 | Stop reason: HOSPADM

## 2021-06-28 RX ORDER — ATROPINE SULFATE 10 MG/ML
2 SOLUTION/ DROPS OPHTHALMIC EVERY 4 HOURS PRN
Status: DISCONTINUED | OUTPATIENT
Start: 2021-06-28 | End: 2021-06-30 | Stop reason: HOSPADM

## 2021-06-28 RX ORDER — ACETAMINOPHEN 650 MG/1
650 SUPPOSITORY RECTAL EVERY 4 HOURS PRN
Status: DISCONTINUED | OUTPATIENT
Start: 2021-06-28 | End: 2021-06-30 | Stop reason: HOSPADM

## 2021-06-28 RX ADMIN — MORPHINE SULFATE 10 MG: 100 SOLUTION ORAL at 13:43

## 2021-06-28 RX ADMIN — INSULIN HUMAN 1 UNITS: 100 INJECTION, SOLUTION PARENTERAL at 00:51

## 2021-06-28 RX ADMIN — DOCUSATE SODIUM 50 MG AND SENNOSIDES 8.6 MG 2 TABLET: 8.6; 5 TABLET, FILM COATED ORAL at 05:16

## 2021-06-28 RX ADMIN — FOLIC ACID 1 MG: 1 TABLET ORAL at 05:16

## 2021-06-28 RX ADMIN — MORPHINE SULFATE 10 MG: 100 SOLUTION ORAL at 18:02

## 2021-06-28 RX ADMIN — LEVOTHYROXINE SODIUM 25 MCG: 0.03 TABLET ORAL at 05:16

## 2021-06-28 RX ADMIN — ASPIRIN 81 MG: 81 TABLET, CHEWABLE ORAL at 05:16

## 2021-06-28 RX ADMIN — INSULIN HUMAN 1 UNITS: 100 INJECTION, SOLUTION PARENTERAL at 06:26

## 2021-06-28 RX ADMIN — GLYCOPYRROLATE 1 MG: 1 TABLET ORAL at 05:16

## 2021-06-28 RX ADMIN — CYANOCOBALAMIN TAB 500 MCG 1000 MCG: 500 TAB at 05:16

## 2021-06-28 ASSESSMENT — PAIN DESCRIPTION - PAIN TYPE: TYPE: ACUTE PAIN

## 2021-06-28 NOTE — DIETARY
Brief Nutrition Services Note: TF discontinued and pt now comfort care, confirmed with RN. Pt now on Regular diet. Please consult RD if pt's status changes, RD available PRN.

## 2021-06-28 NOTE — CARE PLAN
The patient is Watcher - Medium risk of patient condition declining or worsening    Shift Goals  Clinical Goals: Oral care, Q2 turns, comfort  Patient Goals: Rest, comfort  Family Goals: no family present    Progress made toward(s) clinical / shift goals:  Pt transitioned to comfort care this afternoon. Oral care, Q2 turns and medications administered per MAR. Hourly rounding in place.     Patient is not progressing towards the following goals:

## 2021-06-28 NOTE — PROGRESS NOTES
I discussed the case with patient  Niece Jazmyne ,updated patient medical condition including gurgling sound, active aspiration, weakness secondary to the stroke and dysphagia.  She understands the severity of her medical condition.  I discussed about hospice/palliative option, however put a referral for hospice.     I plan to  Call her again at 11 am to discuss further about comfort measures with the nurses at bedside.

## 2021-06-28 NOTE — PROGRESS NOTES
Hospital Medicine Daily Progress Note    Date of Service  6/28/2021    Chief Complaint  70 y.o. male admitted 6/20/2021 with right-sided weakness    Hospital Course  This is 70-year-old male with a past medical history significant for COPD, hypertension, schizophrenia, diabetes mitis with hemoglobin A1c of 5.9, hypothyroidism presented to the ER on 6/20/2021 from group home after falling out of his bed and found to have right-sided weakness.  Upon presentation he is noted to be dysarthric.    He was found to have distal M1 thrombus for which he underwent thrombectomy by IR on 6/20/2021 after which he was intubated secondary to airway protection and thus was admitted to the ICU.    Patient was extubated on 6/24/2021 was transferred to medical floor.  During the stay in the hospital, cardiology continue to follow the patient currently patient is on aspirin 81 mg p.o. daily, lovastatin 80 mg p.o. daily.  PT/OT has evaluated the patient and recommended postacute, referral placed.    He was extubated on 6/25/2021, and was transferred out of the ICU.  During the stay in the hospital, patient was noted to have a of bloody secretions, continues to aspirate actively.  Symptomatic management was provided during the stay.  After extensive discussion with the patient significant other Jazmyne over [phone], patient was made comfort care on 6/28/2021.  During the whole conversation nursing staff called with bedside.    Also multiple discussion during the stay in the hospital with Jazmyne  In reagards to the comfort based care.  Patient was placed on comfort care, discussed with the nursing staff, case management.    Interval Problem Update  Patient is noted to have bloody secretion from his mouth I discussed with intensivist Dr. Elam, he stated that it is most likely secondary to trauma due to excessive suctioning and recommended to monitor closely.  --I discussed the plan of care with the patient's niece at bedside who  recommended DNI/DNR.  --Palliative was consulted, pending evaluation    --Continue aspiration precaution, fall precaution, seizure precaution. Head of the bed at 35 degree; monitor hemoglobin hematocrit    6/27:  --No acute events overnight.  Patient is noted to have mild bloody secretions coming out from oral cavity while suctioning.  Hemoglobin hematocrit is stable.  I called patient's niece at [phone], left voice message, unable to get a hold of the patient family member  --Palliative called regarding goals of care discussion  --Vital signs are stable, currently on 4 L of oxygen, very high risk of aspiration, continue aspiration precaution   -Sodium improving at 147, hemoglobin hematocrit has only stable   -Continue free water flushes at 200 every 6 hours    6/28:  --No acute events overnight, patient is noted to be actively aspirating.  Discussed with reducing the in regards to appropriateness of niece making decision on behalf of patient.  This has been confirmed with bioethics committee on 6/22 that patient next of kin (after her brother); will be her niece.    --At this time patient was made comfort care after discussing with Niece. Discussed with BRISA Matias at bedside    Consultants/Specialty  Critical care  Neurology  Interventional radiology    Code Status  Comfort Care/DNR    Disposition  To be determined    Review of Systems  Review of Systems   Unable to perform ROS: Medical condition        Physical Exam  Temp:  [36.3 °C (97.3 °F)-37.4 °C (99.3 °F)] 36.5 °C (97.7 °F)  Pulse:  [90-97] 97  Resp:  [18-28] 20  BP: (138-159)/(81-91) 138/81  SpO2:  [92 %-94 %] 94 %    Physical Exam  Vitals and nursing note reviewed.   Constitutional:       Appearance: He is well-developed.   HENT:      Head: Normocephalic and atraumatic.      Comments: Oral secretion  Mixed with blood   Does have  cortrak in place  Eyes:      Pupils: Pupils are equal, round, and reactive to light.   Neck:      Vascular: No JVD.       Trachea: No tracheal deviation.   Cardiovascular:      Rate and Rhythm: Normal rate.   Pulmonary:      Effort: Pulmonary effort is normal.      Breath sounds: Rales present. No rhonchi.   Abdominal:      General: Bowel sounds are normal. There is no distension.      Palpations: Abdomen is soft.   Musculoskeletal:      Cervical back: Neck supple.   Skin:     General: Skin is warm.      Nails: There is no clubbing.   Neurological:      Mental Status: He is alert.      Cranial Nerves: No cranial nerve deficit.      Motor: Weakness (Right hemiparesis) present. No abnormal muscle tone.      Comments: Exam limited as patient does not consistently follow commands           Fluids    Intake/Output Summary (Last 24 hours) at 6/28/2021 1200  Last data filed at 6/28/2021 1000  Gross per 24 hour   Intake 600 ml   Output 1850 ml   Net -1250 ml       Laboratory  Recent Labs     06/26/21  0153 06/26/21  1529 06/27/21  0156 06/27/21  0832 06/27/21 2052 06/28/21  0131 06/28/21  1032   WBC 11.6*  --  12.5*  --   --  17.1*  --    RBC 4.80  --  4.92  --   --  5.33  --    HEMOGLOBIN 15.2   < > 15.8   < > 17.4 16.9 17.0   HEMATOCRIT 47.0   < > 48.4   < > 52.5* 51.8 52.4*   MCV 97.9*  --  98.4*  --   --  97.2  --    MCH 31.7  --  32.1  --   --  31.7  --    MCHC 32.3*  --  32.6*  --   --  32.6*  --    RDW 46.6  --  46.4  --   --  46.7  --    PLATELETCT 150*  --  170  --   --  197  --    MPV 11.1  --  11.0  --   --  10.9  --     < > = values in this interval not displayed.     Recent Labs     06/26/21  0153 06/27/21  0156 06/28/21  0131   SODIUM 150* 147* 149*   POTASSIUM 4.1 3.9 4.0   CHLORIDE 113* 112 112   CO2 29 25 27   GLUCOSE 122* 159* 174*   BUN 29* 36* 41*   CREATININE 0.75 0.78 0.83   CALCIUM 10.1 10.6* 10.8*                   Imaging  DX-CHEST-LIMITED (1 VIEW)   Final Result      1.  There is similar interstitial opacity in the lung bases possibly due to chronic scarring.   2.  There is no acute pneumonia or failure.       DX-ABDOMEN FOR TUBE PLACEMENT   Final Result      Cortrak feeding tube tip projects in the region of the distal stomach/duodenal bulb.      DX-CHEST-PORTABLE (1 VIEW)   Final Result      Stable chest with hyperinflation and aortic ectasia      MR-BRAIN-W/O   Final Result      1.  Moderate to large area of ischemia in the LEFT frontal and parietal lobes involving the basal ganglia with scattered areas of ischemia in the LEFT posterior temporal and occipital lobes, MCA territory. The area of ischemia is much smaller than the    area of tissue at risks seen on the perfusion CT.   2.  No acute hemorrhage   3.  Small areas of encephalomalacia in the RIGHT frontal cortex and BILATERAL cerebellum   4.  Atrophy   5.  White matter changes      EC-ECHOCARDIOGRAM COMPLETE W/O CONT   Final Result      CT-HEAD W/O   Final Result      1.  Developing left frontotemporal infarct with associated cortically based low density      2.  Negative for hemorrhage      3.  Underlying cerebral volume loss and white matter change      DX-CHEST-PORTABLE (1 VIEW)   Final Result      No significant change      DX-CHEST-PORTABLE (1 VIEW)   Final Result      Radiographically satisfactory positioning of support hardware and no pneumothorax is seen      DX-ABDOMEN FOR TUBE PLACEMENT   Final Result      Feeding tube extends below the diaphragm with tip at the level of the proximal duodenum. No metallic instruments are identified.      CT-HEAD W/O   Final Result         NO ACUTE ABNORMALITIES ARE NOTED ON CT SCAN OF THE HEAD.      Findings are consistent with atrophy.  Decreased attenuation in the periventricular white matter likely indicates microvascular ischemic disease.      IR-THROMBO MECHANICAL ARTERY,INIT   Final Result         70-year-old patient who presented with acute onset of right-sided upper and lower extremity weakness and slurred speech was found to have a left M1 occlusion. Patient underwent emergent left MCA thrombectomy with  complete restoration of flow to the left    MCA as described above. Minimal distal embolization to a distal parietal M4 cortical branches noted.      Final recanalization score: TICI 2C.               DX-CHEST-PORTABLE (1 VIEW)   Final Result      No acute cardiopulmonary findings.      CT-CTA HEAD WITH & W/O-POST PROCESS   Final Result      Thrombus in the distal M1 segment of the left middle cerebral artery. Reconstitution of the M2 middle cerebral artery branches.               Comment: Results discussed with Dr. Ren at approximately 4:35 AM      CT-CTA NECK WITH & W/O-POST PROCESSING   Final Result      Atherosclerotic disease. Resulting stenosis is less than 50%      CT-CSPINE WITHOUT PLUS RECONS   Final Result      No acute fracture is identified.      CT-CEREBRAL PERFUSION ANALYSIS   Final Result      1.  Cerebral blood flow less than 30% likely representing completed infarct = 60 mL.      2.  T Max more than 6 seconds likely representing combination of completed infarct and ischemia = 231 mL.      3.  Mismatched volume likely representing ischemic brain/penumbra = 171 mL      4.  Please note that the cerebral perfusion was performed on the limited brain tissue around the basal ganglia region. Infarct/ischemia outside the CT perfusion sections can be missed in this study.      CT-HEAD W/O   Final Result      1.  No acute intracranial findings.      2.  Diffuse atrophy and periventricular white matter changes, consistent with chronic small vessel.                    Assessment/Plan    Excessive oral secretions  Assessment & Plan  Patient is noted to have excessive oral secretion, status post excessive suctioning.   Currently patient is noted to have recent oral secretion likely secondary to trauma from suctioning. I discussed the case with intensivist Dr Elam  Who recs monitoring    On 6/27/1021, he is worse occasionally noted to be less bloody, monitor for aspiration  Continue comfort care measure,  now actively aspirating       * Acute ischemic stroke (HCC)  Assessment & Plan  Was not a candidate for alteplase    ---Underwent left MCA thrombectomy on 6/19/2021 continue aspirin and atorvastatin    --PT/OT/SLP , continue Aspiration precautions  Patient is DNR/DNI with no reintubation  : Discussed by critical care intensivist and myself with the patient family   Continue asa/statin     Continue comfort care measure       Advance care planning  Assessment & Plan  Had extensive discussion with the patient needs with next of kin over the phone 2 times today on 6/20/2021 regarding patient's poor prognosis, quality of life.  I discussed the patient has been actively aspirating, noted to have bloody secretion coming out from mild along with dysphagia hypernatremia.  After an extensive discussion with the niece, patient was made comfort care.      Comfort measure  Orders in place  More than  20 spent in discussing over the phone.  Could not discuss face-to-face as patient cannot make medical decision and no family at bedside.        Hypernatremia  Assessment & Plan  At 149 , continue free water flushes 300 every 6 hrs  Now on comfort care    Acute respiratory failure with hypoxia (HCC)  Assessment & Plan  Intubated for inability to protect airway underwent one-way extubation on 6/25/2021   --Aspiration precautions and RT protocol   -- On 4-5 L     Need aggressive pulmonary toileting, RT following      Continue comfort care    Type 2 diabetes mellitus, without long-term current use of insulin ([facility])  Assessment & Plan  Hemoglobin A1c 5.9   --ISS and hypoglycemia protocol    Hyperlipidemia  Assessment & Plan  Continue atorvastatin    Hypertension  Assessment & Plan  Monitor blood pressure    currently his blood pressure is noted to be well controlled, monitor    Continue comfort measure    Hypothyroidism  Assessment & Plan  Continue levothyroxine    Schizophrenia ([facility])  Assessment & Plan  Continue Zyprexa and monitor  Qtc  at 418, monitor    Continue comfort care measure      VTE prophylaxis: stop lovenox as patient is having bloody secretion

## 2021-06-28 NOTE — HOSPICE
Hospice consult - I called pt's niece, Jazmyne, who lives in [place].  Hospice services reviewed.  Jazmyne states she had just given instructions for pt to be placed on Comfort Care.  Jazmyne wishes to see how pt progresses on Comfort Care before deciding upon any further change in his plan of care.

## 2021-06-28 NOTE — THERAPY
Missed Therapy     Patient Name: Morgan Davis  Age:  70 y.o., Sex:  male  Medical Record #: [id number]  Today's Date: 6/28/2021 06/28/21 1142   Interdisciplinary Plan of Care Collaboration   IDT Collaboration with  Nursing;Speech Therapist   Collaboration Comments Pt has transitioned to comfort care and will be discharged from services at this time. Please re-consult OT should there be a change in POC.

## 2021-06-28 NOTE — PALLIATIVE CARE
"Palliative Care follow-up  Case discussed with MD regarding appropriateness of niece in making decisions on behalf of this patient. Per CM note from 6/22 \"BRISA MADDEN confirmed with Dima Khan, Bioethics Director, that Pt's next of kin (after brother) would be his nieces. Cassie states Milagros can receive medical updates as well, but medical decisions should be made by Pt's nieces.\" Updates to Dr. Valles. Will cancel PC consult as no other needs per MD.      Updated: MD/RN    Plan: will cancel PC consult- please reach out for any needs    Thank you for allowing Palliative Care to support this patient and family. Contact [phone] for additional assistance, change in patient status, or with any questions/concerns.     "

## 2021-06-28 NOTE — THERAPY
Physical Therapy Contact Note       06/28/21 2181   Interdisciplinary Plan of Care Collaboration   Collaboration Comments Pt now comfort care status, will no longer follow for skilled acute PT.  Available as needed for DC planning.       Cally Cota, PT, DPT

## 2021-06-28 NOTE — CARE PLAN
The patient is Watcher - Medium risk of patient condition declining or worsening    Shift Goals  Clinical Goals: oral care, comfort  Patient Goals: sleep  Family Goals: no family present    Progress made toward(s) clinical / shift goals:  limited oral bleeding    Patient is not progressing towards the following goals:

## 2021-06-28 NOTE — CONSULTS
Physical Medicine and Rehabilitation Consultation              Date of initial consultation: 6/28/2021  Consulting provider: Renea Smith MD  Reason for consultation: assess for acute inpatient rehab appropriateness  LOS: 8 Day(s)    Chief complaint: stroke     HPI: The patient is a 70 y.o. right hand dominant male with a past medical history of schizophrenia, hypertension, hypothyroidism, COPD, noninsulin-dependent diabetes;  who presented on 6/20/2021  3:55 AM with right-sided weakness after falling out of his bed at his group home.  Patient also presented with severe dysarthria.  CTA head was notable for thrombus in the distal M1 segment of the left MCA and patient underwent thrombectomy on 6/20 with TICI 2C flow restoration.  Later that day, patient experienced acute respiratory failure with hypoxia, likely secondary to aspiration, and required emergent intubation.  Follow-up MRI found moderate to large area of ischemia in the left frontal and parietal lobes.  Hospital course has been complicated by rapid deterioration, most recently with continued aspiration, gurgling, weakness and concern for mortality    The patient currently has severe dysarthria and right-sided hemiplegia.  Patient's nurse nearby informs me that he has recently been made comfort care after discussion with family.  Patient states he is not in any pain, he endorses right-sided weakness, and difficulty speaking.  I offered a variety of services available to him through PMNR, patient declined at this time.    Social Hx:  Lives in group home      THERAPY:  PT: Functional mobility   6/25: Total assist for bed mobility, max assist x2 for sit to stand, unable to participate in gait    OT: ADLs  6/25: Total assist for lower body dressing    SLP:   6/20: N.p.o.    IMAGING:      MR brain 6/21/2021  1.  Moderate to large area of ischemia in the LEFT frontal and parietal lobes involving the basal ganglia with scattered areas of ischemia in  the LEFT posterior temporal and occipital lobes, MCA territory. The area of ischemia is much smaller than the   area of tissue at risks seen on the perfusion CT.  2.  No acute hemorrhage  3.  Small areas of encephalomalacia in the RIGHT frontal cortex and BILATERAL cerebellum  4.  Atrophy  5.  White matter changes    PROCEDURES:  Renea mSith MD 6/20/2021  left MCA Occlusion TICI 2C recanalization achieved.    PMH:  Past Medical History:   Diagnosis Date   • Hyperlipidemia    • Hypertension    • Schizophrenia (HCC)    • Thyroid disease        PSH:  No past surgical history on file.    FHX:  Non-pertinent to today's issues    Medications:  Current Facility-Administered Medications   Medication Dose   • glycopyrrolate (ROBINUL) tablet 1 mg  1 mg   • HYDROmorphone (Dilaudid) injection 0.5 mg  0.5 mg   • enalaprilat (VASOTEC) injection 1.25 mg  1.25 mg   • labetalol (NORMODYNE/TRANDATE) injection 10 mg  10 mg   • acetaminophen (Tylenol) tablet 650 mg  650 mg   • [Held by provider] enoxaparin (LOVENOX) inj 40 mg  40 mg   • OLANZapine (ZYPREXA) tablet 10 mg  10 mg   • hydrALAZINE (APRESOLINE) injection 10-20 mg  10-20 mg   • insulin regular (HumuLIN R,NovoLIN R) injection  1-6 Units    And   • dextrose 50% (D50W) injection 50 mL  50 mL   • Pharmacy Consult: Enteral tube insertion - review meds/change route/product selection  1 Each   • aspirin (ASA) chewable tab 81 mg  81 mg   • atorvastatin (LIPITOR) tablet 80 mg  80 mg   • cyanocobalamin (VITAMIN B-12) tablet 1,000 mcg  1,000 mcg   • folic acid (FOLVITE) tablet 1 mg  1 mg   • levothyroxine (SYNTHROID) tablet 25 mcg  25 mcg   • senna-docusate (PERICOLACE or SENOKOT S) 8.6-50 MG per tablet 2 tablet  2 tablet    And   • polyethylene glycol/lytes (MIRALAX) PACKET 1 Packet  1 Packet    And   • magnesium hydroxide (MILK OF MAGNESIA) suspension 30 mL  30 mL    And   • bisacodyl (DULCOLAX) suppository 10 mg  10 mg   • thiamine (Vitamin B-1) tablet 100 mg  100 mg   •  "Respiratory Therapy Consult         Allergies:  No Known Allergies    Physical Exam:  Vitals: /81   Pulse 97   Temp 36.5 °C (97.7 °F) (Temporal)   Resp 20   Ht 1.753 m (5' 9\")   Wt 77.9 kg (171 lb 11.8 oz)   SpO2 94%   Gen: NAD  Head:  NC/AT  Eyes/ Nose/ Mouth: PERRLA, moist mucous membranes  Cardio: RRR, good distal perfusion, warm extremities  Pulm: normal respiratory effort, no cyanosis   Abd: Soft NTND, negative borborygmi   Ext: No peripheral edema. No calf tenderness. No clubbing.  Mental status: follows commands  Speech: Severe dysarthria    Labs: Reviewed and significant for   Recent Labs     06/26/21  0153 06/26/21  1529 06/27/21  0156 06/27/21  0832 06/27/21  1454 06/27/21 2052 06/28/21  0131   RBC 4.80  --  4.92  --   --   --  5.33   HEMOGLOBIN 15.2   < > 15.8   < > 16.7 17.4 16.9   HEMATOCRIT 47.0   < > 48.4   < > 50.7 52.5* 51.8   PLATELETCT 150*  --  170  --   --   --  197    < > = values in this interval not displayed.     Recent Labs     06/26/21  0153 06/27/21  0156 06/28/21  0131   SODIUM 150* 147* 149*   POTASSIUM 4.1 3.9 4.0   CHLORIDE 113* 112 112   CO2 29 25 27   GLUCOSE 122* 159* 174*   BUN 29* 36* 41*   CREATININE 0.75 0.78 0.83   CALCIUM 10.1 10.6* 10.8*     Recent Results (from the past 24 hour(s))   POCT glucose device results    Collection Time: 06/27/21 12:11 PM   Result Value Ref Range    Glucose - Accu-Ck 176 (H) 65 - 99 mg/dL   HEMOGLOBIN AND HEMATOCRIT    Collection Time: 06/27/21  2:54 PM   Result Value Ref Range    Hemoglobin 16.7 14.0 - 18.0 g/dL    Hematocrit 50.7 42.0 - 52.0 %   PREALBUMIN    Collection Time: 06/27/21  4:57 PM   Result Value Ref Range    Pre-Albumin 21.5 18.0 - 38.0 mg/dL   POCT glucose device results    Collection Time: 06/27/21  6:27 PM   Result Value Ref Range    Glucose - Accu-Ck 160 (H) 65 - 99 mg/dL   HEMOGLOBIN AND HEMATOCRIT    Collection Time: 06/27/21  8:52 PM   Result Value Ref Range    Hemoglobin 17.4 14.0 - 18.0 g/dL    Hematocrit 52.5 " (H) 42.0 - 52.0 %   POCT glucose device results    Collection Time: 06/28/21 12:45 AM   Result Value Ref Range    Glucose - Accu-Ck 158 (H) 65 - 99 mg/dL   CBC WITH DIFFERENTIAL    Collection Time: 06/28/21  1:31 AM   Result Value Ref Range    WBC 17.1 (H) 4.8 - 10.8 K/uL    RBC 5.33 4.70 - 6.10 M/uL    Hemoglobin 16.9 14.0 - 18.0 g/dL    Hematocrit 51.8 42.0 - 52.0 %    MCV 97.2 81.4 - 97.8 fL    MCH 31.7 27.0 - 33.0 pg    MCHC 32.6 (L) 33.7 - 35.3 g/dL    RDW 46.7 35.9 - 50.0 fL    Platelet Count 197 164 - 446 K/uL    MPV 10.9 9.0 - 12.9 fL    Neutrophils-Polys 78.40 (H) 44.00 - 72.00 %    Lymphocytes 11.10 (L) 22.00 - 41.00 %    Monocytes 9.60 0.00 - 13.40 %    Eosinophils 0.10 0.00 - 6.90 %    Basophils 0.20 0.00 - 1.80 %    Immature Granulocytes 0.60 0.00 - 0.90 %    Nucleated RBC 0.00 /100 WBC    Neutrophils (Absolute) 13.39 (H) 1.82 - 7.42 K/uL    Lymphs (Absolute) 1.89 1.00 - 4.80 K/uL    Monos (Absolute) 1.63 (H) 0.00 - 0.85 K/uL    Eos (Absolute) 0.01 0.00 - 0.51 K/uL    Baso (Absolute) 0.04 0.00 - 0.12 K/uL    Immature Granulocytes (abs) 0.10 0.00 - 0.11 K/uL    NRBC (Absolute) 0.00 K/uL   MAGNESIUM    Collection Time: 06/28/21  1:31 AM   Result Value Ref Range    Magnesium 2.6 (H) 1.5 - 2.5 mg/dL   PHOSPHORUS    Collection Time: 06/28/21  1:31 AM   Result Value Ref Range    Phosphorus 3.2 2.5 - 4.5 mg/dL   Comp Metabolic Panel    Collection Time: 06/28/21  1:31 AM   Result Value Ref Range    Sodium 149 (H) 135 - 145 mmol/L    Potassium 4.0 3.6 - 5.5 mmol/L    Chloride 112 96 - 112 mmol/L    Co2 27 20 - 33 mmol/L    Anion Gap 10.0 7.0 - 16.0    Glucose 174 (H) 65 - 99 mg/dL    Bun 41 (H) 8 - 22 mg/dL    Creatinine 0.83 0.50 - 1.40 mg/dL    Calcium 10.8 (H) 8.5 - 10.5 mg/dL    AST(SGOT) 43 12 - 45 U/L    ALT(SGPT) 43 2 - 50 U/L    Alkaline Phosphatase 101 (H) 30 - 99 U/L    Total Bilirubin 0.5 0.1 - 1.5 mg/dL    Albumin 4.0 3.2 - 4.9 g/dL    Total Protein 7.7 6.0 - 8.2 g/dL    Globulin 3.7 (H) 1.9 - 3.5  g/dL    A-G Ratio 1.1 g/dL   ESTIMATED GFR    Collection Time: 06/28/21  1:31 AM   Result Value Ref Range    GFR If African American >60 >60 mL/min/1.73 m 2    GFR If Non African American >60 >60 mL/min/1.73 m 2   POCT glucose device results    Collection Time: 06/28/21  6:21 AM   Result Value Ref Range    Glucose - Accu-Ck 175 (H) 65 - 99 mg/dL         ASSESSMENT:  Patient is a 70 y.o. male admitted with left frontoparietal stroke now on comfort care.      Rehabilitation: Impaired ADLs and mobility  Given patient's overall condition, and election for comfort care, there is no role for aggressive inpatient rehab at this time.    Additional Recommendations:  -Agree with comfort care measures given patient's age, size of stroke, current functional status, and living situation.  This is a severely debilitating stroke and he will require an extended recovery time as well as heavy services afterwards which are likely unavailable to him at his group home.  -Patient denies pain or discomfort at this time  -No role for continued therapy while in-house  -PMR will sign off, if further recommendations or medical management is requested please reconsult    Thank you for allowing us to participate in the care of this patient.     Patient was seen for 82 minutes on unit/floor of which > 50% of time was spent on counseling and coordination of care regarding the above, including prognosis, risk reduction, benefits of treatment, and options for next stage of care.    Lamont Self, DO   Physical Medicine and Rehabilitation

## 2021-06-28 NOTE — PROGRESS NOTES
Pt transitioned to comfort care this afternoon, POC discussed with pts carolann Samano. Cortrak removed. Pt medicated per MAR for pain. Hourly rounding in place.

## 2021-06-28 NOTE — THERAPY
Speech Language Pathology  Daily Treatment     Patient Name: Morgan Davis  Age:  70 y.o., Sex:  male  Medical Record #: [id number]  Today's Date: 6/28/2021     Precautions  Precautions: Fall Risk, Swallow Precautions ( See Comments)  Comments: extubated 6/24; R hemiplegia    Assessment    Pt seen on this date for dysphagia therapy. He was A&Ox0 when provided yes/no questions and gestured for writing. He held pen in L hand and was provided paper, however, writing was unintelligible. Pt with copious dried bloody secretions in mouth so extensive oral care was provided. Prior to PO trials, pt on 4L 02 and saturations at 90*. Trials of single ice chips x5 were provided to pt and absent swallow appreciated in 2/5 trials and immediate coughing following the swallow appreciated which is highly concerning for aspiration. He was unable to follow directives to close lips and only closed them reflexively x2. Saturations dropped to 86-87% after oral care and 1 ice chip and okay per RN to increase 02 to 6L which brought sats up to 90%. At this time, pt is at very high risk for aspiration so recommend NPO with continuation of non-oral source of nutrition, however, pt was switched to comfort care as this clinician was in the room. Given comfort care status, no further acute SLP services are recommended at this time. SLP available for questions as needed.    Plan    1) Recommend NPO/TF, however, pt transitioned to comfort care as this clinician was in the room  2) No further acute SLP services recommended as pt was transitioned to comfort care but available for questions as needed    Continue current treatment plan.    Discharge Recommendations: Recommend post-acute placement for additional speech therapy services prior to discharge home       Objective       06/28/21 1135   Vitals   O2 (LPM) 6   O2 Delivery Device Silicone Nasal Cannula   Written Expression   Dominant Hand Left   Cognitive-Linguistic   Level of Consciousness Alert    Orientation Level Not Oriented to Name;Not Oriented to Time;Not Oriented to Year;Not Oriented to Reason   Dysphagia    Dysphagia X   Positioning / Behavior Modification Modulate Rate or Bite Size;Self Monitoring   Other Treatments trials of single ice chips   Diet / Liquid Recommendation NPO;Pre-Feeding Trials with SLP Only  (pt now transferred to comfort care)   Nutritional Liquid Intake Rating Scale Nothing by mouth   Nutritional Food Intake Rating Scale Nothing by mouth   Skilled Intervention Compensatory Strategies;Verbal Cueing   Recommended Route of Medication Administration   Medication Administration  Other (See Comments)   Short Term Goals   Short Term Goal # 1 Pt will be able to consume prefeeding trials with SLP only with no overt S/Sx of aspiration noted   Goal Outcome # 1 Goal not met   Short Term Goal # 2 Pt will be able to follow single step oral motor commands for lingual and labial and pharyngeal strengthening with 60% accuracy when given moderate cues.    Goal Outcome # 2  Goal not met

## 2021-06-28 NOTE — ASSESSMENT & PLAN NOTE
Extensive discussion with the patient with next of kin over the phone regarding patient's poor prognosis, quality of life. Discussed the patient has been actively aspirating, noted to have bloody secretion coming out from mild along with dysphagia hypernatremia.  After an extensive discussion with the niece, patient was made comfort care.    Comfort measure  Orders in place  Hospice consulted

## 2021-06-29 ENCOUNTER — HOME CARE VISIT (OUTPATIENT)
Dept: HOSPICE | Facility: HOSPICE | Age: 71
End: 2021-06-29
Payer: MEDICARE

## 2021-06-29 LAB
ANION GAP SERPL CALC-SCNC: 13 MMOL/L (ref 7–16)
BASOPHILS # BLD AUTO: 0.2 % (ref 0–1.8)
BASOPHILS # BLD: 0.04 K/UL (ref 0–0.12)
BUN SERPL-MCNC: 49 MG/DL (ref 8–22)
CALCIUM SERPL-MCNC: 10.5 MG/DL (ref 8.5–10.5)
CHLORIDE SERPL-SCNC: 118 MMOL/L (ref 96–112)
CO2 SERPL-SCNC: 25 MMOL/L (ref 20–33)
CREAT SERPL-MCNC: 1.02 MG/DL (ref 0.5–1.4)
EOSINOPHIL # BLD AUTO: 0.01 K/UL (ref 0–0.51)
EOSINOPHIL NFR BLD: 0.1 % (ref 0–6.9)
ERYTHROCYTE [DISTWIDTH] IN BLOOD BY AUTOMATED COUNT: 47.4 FL (ref 35.9–50)
GLUCOSE SERPL-MCNC: 140 MG/DL (ref 65–99)
HCT VFR BLD AUTO: 52.9 % (ref 42–52)
HGB BLD-MCNC: 17.4 G/DL (ref 14–18)
IMM GRANULOCYTES # BLD AUTO: 0.11 K/UL (ref 0–0.11)
IMM GRANULOCYTES NFR BLD AUTO: 0.6 % (ref 0–0.9)
LYMPHOCYTES # BLD AUTO: 2.91 K/UL (ref 1–4.8)
LYMPHOCYTES NFR BLD: 16.3 % (ref 22–41)
MCH RBC QN AUTO: 32 PG (ref 27–33)
MCHC RBC AUTO-ENTMCNC: 32.9 G/DL (ref 33.7–35.3)
MCV RBC AUTO: 97.4 FL (ref 81.4–97.8)
MONOCYTES # BLD AUTO: 1.85 K/UL (ref 0–0.85)
MONOCYTES NFR BLD AUTO: 10.4 % (ref 0–13.4)
NEUTROPHILS # BLD AUTO: 12.93 K/UL (ref 1.82–7.42)
NEUTROPHILS NFR BLD: 72.4 % (ref 44–72)
NRBC # BLD AUTO: 0 K/UL
NRBC BLD-RTO: 0 /100 WBC
PLATELET # BLD AUTO: 232 K/UL (ref 164–446)
PMV BLD AUTO: 11.2 FL (ref 9–12.9)
POTASSIUM SERPL-SCNC: 4.3 MMOL/L (ref 3.6–5.5)
RBC # BLD AUTO: 5.43 M/UL (ref 4.7–6.1)
SODIUM SERPL-SCNC: 156 MMOL/L (ref 135–145)
WBC # BLD AUTO: 17.9 K/UL (ref 4.8–10.8)

## 2021-06-29 PROCEDURE — 770006 HCHG ROOM/CARE - MED/SURG/GYN SEMI*

## 2021-06-29 PROCEDURE — 700101 HCHG RX REV CODE 250: Performed by: HOSPITALIST

## 2021-06-29 PROCEDURE — 700102 HCHG RX REV CODE 250 W/ 637 OVERRIDE(OP): Performed by: HOSPITALIST

## 2021-06-29 PROCEDURE — A9270 NON-COVERED ITEM OR SERVICE: HCPCS | Performed by: HOSPITALIST

## 2021-06-29 PROCEDURE — 36415 COLL VENOUS BLD VENIPUNCTURE: CPT

## 2021-06-29 PROCEDURE — 80048 BASIC METABOLIC PNL TOTAL CA: CPT

## 2021-06-29 PROCEDURE — 85025 COMPLETE CBC W/AUTO DIFF WBC: CPT

## 2021-06-29 PROCEDURE — 99231 SBSQ HOSP IP/OBS SF/LOW 25: CPT | Performed by: STUDENT IN AN ORGANIZED HEALTH CARE EDUCATION/TRAINING PROGRAM

## 2021-06-29 RX ORDER — HALOPERIDOL 5 MG/ML
5 INJECTION INTRAMUSCULAR EVERY 4 HOURS PRN
Status: DISCONTINUED | OUTPATIENT
Start: 2021-06-29 | End: 2021-06-30 | Stop reason: HOSPADM

## 2021-06-29 RX ADMIN — MORPHINE SULFATE 20 MG: 100 SOLUTION ORAL at 07:53

## 2021-06-29 RX ADMIN — ATROPINE SULFATE 2 DROP: 10 SOLUTION OPHTHALMIC at 16:34

## 2021-06-29 RX ADMIN — MORPHINE SULFATE 20 MG: 100 SOLUTION ORAL at 12:08

## 2021-06-29 RX ADMIN — MORPHINE SULFATE 20 MG: 100 SOLUTION ORAL at 20:54

## 2021-06-29 RX ADMIN — MORPHINE SULFATE 20 MG: 100 SOLUTION ORAL at 16:34

## 2021-06-29 RX ADMIN — BISACODYL 10 MG: 10 SUPPOSITORY RECTAL at 02:20

## 2021-06-29 RX ADMIN — MORPHINE SULFATE 10 MG: 100 SOLUTION ORAL at 02:13

## 2021-06-29 RX ADMIN — MORPHINE SULFATE 10 MG: 100 SOLUTION ORAL at 03:48

## 2021-06-29 ASSESSMENT — PAIN DESCRIPTION - PAIN TYPE
TYPE: ACUTE PAIN
TYPE: ACUTE PAIN

## 2021-06-29 NOTE — CARE PLAN
The patient is Watcher - Medium risk of patient condition declining or worsening    Shift Goals  Clinical Goals: Comfort  Patient Goals: Comfort  Family Goals: NA    Progress made toward(s) clinical / shift goals:  Pt transitioned to comfort care 6/28. Pt medicated per MAR for pain. Repositioning Q2 hours. Family updated on POC. Hourly rounding in place.     Patient is not progressing towards the following goals:

## 2021-06-29 NOTE — DISCHARGE PLANNING
Anticipated Discharge Disposition:   [facility] group home  [facility]    Action:    RN CM spoke to patient's nieceJazmyne.  She provided consent to send referral to [facility].    Choice form faxed to [facility].    BRISA MADDEN spoke with Vane with [facility].  She anticipates that patient can go back to the group home tomorrow with hospice.    Barriers to Discharge:    Hospice acceptance and coordination of DME    Plan:    Collaborate with patient's niece, group home and hospice agency.

## 2021-06-29 NOTE — DISCHARGE PLANNING
Received Choice form at 2242  Agency/Facility Name: 1)[facility]  Referral sent per Choice form @ 0701

## 2021-06-29 NOTE — HOSPICE
Saw patient he was in his room awake but non verbal. He appeared in pain. RN to medicate him.   LM for carolann Samano to call back to see if she is willing to transfer patient back to  on hospice.   CHANO Mills updated.

## 2021-06-29 NOTE — DISCHARGE PLANNING
Anticipated Discharge Disposition:   Group home  Hospice    Action:    RN CHANO spoke with Sarina owner of [facility]group [place]) [phone].  She stated that the patient could come back to the group home with hospice.    Voalte msg to [facility] RN Sofi Akhtar.  She replied and left msg with patient's niece, Jazmyne.    Barriers to Discharge:    Niece decision making    Plan:    F/U with [facility] RN.

## 2021-06-29 NOTE — CARE PLAN
The patient is Watcher - Medium risk of patient condition declining or worsening    Shift Goals  Clinical Goals: Safety, Comfort   Patient Goals: Comfort   Family Goals: NA    Progress made toward(s) clinical / shift goals:    Pt Q2 turns and as needed, medications given per MAR, Non pharmacological interventions provided.     Patient is not progressing towards the following goals:      Problem: Psychosocial - Patient Condition  Goal: Patient's ability to verbalize feelings about condition will improve  Outcome: Not Progressing  Note: Interventions: Encourage pt to express feelings to nursing staff. Educate pt on proper use of the call light.   Outcome: Will continue to monitor throughout shift.      Goal: Patient's ability to re-evaluate and adapt role responsibilities will improve  Outcome: Not Progressing  Note: Plan to advance goal:  Continue to reorient Pt and express feelings      Problem: Neuro Status  Goal: Neuro status will remain stable or improve  Outcome: Not Progressing  Note: Plan to advance goal: Pt on Comfort care, will continue to provide comfort      Problem: Dysphagia  Goal: Dysphagia will improve  Outcome: Not Progressing  Note: Plan to advance goal: Pt unable to swallow at this time, Comfort care in progress will address any and all needs per Pt and will continue to monitor.      Problem: Mobility - Stroke  Goal: Patient's capacity to carry out activities will improve  Outcome: Not Progressing  Note: Plan to advance goal: Pt comfort care at this time will continue to address any need.      Problem: Knowledge Deficit - Standard  Goal: Patient and family/care givers will demonstrate understanding of plan of care, disease process/condition, diagnostic tests and medications  Outcome: Not Progressing  Note: Plan to advance goal: Pt comfort care at this time will continue to address any and all needs.

## 2021-06-30 VITALS
HEART RATE: 106 BPM | RESPIRATION RATE: 20 BRPM | BODY MASS INDEX: 25.44 KG/M2 | WEIGHT: 171.74 LBS | SYSTOLIC BLOOD PRESSURE: 115 MMHG | OXYGEN SATURATION: 78 % | TEMPERATURE: 98 F | HEIGHT: 69 IN | DIASTOLIC BLOOD PRESSURE: 62 MMHG

## 2021-06-30 PROBLEM — Z51.5 HOSPICE CARE: Status: ACTIVE | Noted: 2021-06-28

## 2021-06-30 PROCEDURE — 700102 HCHG RX REV CODE 250 W/ 637 OVERRIDE(OP): Performed by: HOSPITALIST

## 2021-06-30 PROCEDURE — 99239 HOSP IP/OBS DSCHRG MGMT >30: CPT | Performed by: STUDENT IN AN ORGANIZED HEALTH CARE EDUCATION/TRAINING PROGRAM

## 2021-06-30 PROCEDURE — A9270 NON-COVERED ITEM OR SERVICE: HCPCS | Performed by: HOSPITALIST

## 2021-06-30 PROCEDURE — 700111 HCHG RX REV CODE 636 W/ 250 OVERRIDE (IP): Performed by: HOSPITALIST

## 2021-06-30 RX ORDER — LORAZEPAM 2 MG/ML
1 CONCENTRATE ORAL
Qty: 30 ML | Status: SHIPPED
Start: 2021-06-30 | End: 2021-07-05

## 2021-06-30 RX ORDER — ONDANSETRON 8 MG/1
8 TABLET, ORALLY DISINTEGRATING ORAL EVERY 8 HOURS PRN
Qty: 10 TABLET | Refills: 0 | Status: SHIPPED
Start: 2021-06-30

## 2021-06-30 RX ORDER — MORPHINE SULFATE 100 MG/5ML
10 SOLUTION ORAL
Qty: 30 ML | Refills: 0 | Status: SHIPPED
Start: 2021-06-30 | End: 2021-07-05

## 2021-06-30 RX ADMIN — MORPHINE SULFATE 20 MG: 100 SOLUTION ORAL at 00:02

## 2021-06-30 RX ADMIN — MORPHINE SULFATE 20 MG: 100 SOLUTION ORAL at 09:45

## 2021-06-30 RX ADMIN — LORAZEPAM 1 MG: 2 INJECTION INTRAMUSCULAR; INTRAVENOUS at 04:31

## 2021-06-30 RX ADMIN — MORPHINE SULFATE 20 MG: 100 SOLUTION ORAL at 03:31

## 2021-06-30 ASSESSMENT — PAIN DESCRIPTION - PAIN TYPE
TYPE: ACUTE PAIN
TYPE: ACUTE PAIN

## 2021-06-30 NOTE — CARE PLAN
The patient is Stable - Low risk of patient condition declining or worsening    Shift Goals  Clinical Goals: comfort  Patient Goals: comfort  Family Goals: n/a    Progress made toward(s) clinical / shift goals:   Patient medicated for pain and anxiety to maintain comfort     Patient is not progressing towards the following goals:

## 2021-06-30 NOTE — PROGRESS NOTES
Patient discharged to group home. Patient is alert and oriented to self. IV removed. Discharge instructions given to transport company and called niece to verify that patient is going back to group home. Signed document in chart with another nurse. All belongings went with patient.     NIHSS completed. Patient does not follow commands or understand questions.

## 2021-06-30 NOTE — PROGRESS NOTES
Hospital Medicine Daily Progress Note    Date of Service  6/29/2021    Chief Complaint  70 y.o. male admitted 6/20/2021 with right-sided weakness    Hospital Course  This is 70-year-old male with a past medical history significant for COPD, hypertension, schizophrenia, diabetes with hemoglobin A1c of 5.9, hypothyroidism presented to the ER on 6/20/2021 from group home after falling out of his bed and found to have right-sided weakness.  Upon presentation he is noted to be dysarthric.    He was found to have distal M1 thrombus for which he underwent thrombectomy by IR on 6/20/2021 after which he was intubated secondary to airway protection and thus was admitted to the ICU.    Patient was extubated on 6/24/2021 was transferred to medical floor.  During the stay in the hospital, cardiology continue to follow the patient currently patient is on aspirin 81 mg p.o. daily, lovastatin 80 mg p.o. daily.  PT/OT has evaluated the patient and recommended postacute, referral placed.    He was extubated on 6/25/2021, and was transferred out of the ICU.  During the stay in the hospital, patient was noted to have a of bloody secretions, continues to aspirate actively.  Symptomatic management was provided during the stay.  After extensive discussion with the patient decision maker Jazmyne, patient was made comfort care on 6/28/2021.  During the whole conversation nursing staff called with bedside. Hospice consulted.    Interval Problem Update  6/26: Patient is noted to have bloody secretion from his mouth I discussed with intensivist Dr. Elam, he stated that it is most likely secondary to trauma due to excessive suctioning and recommended to monitor closely.  --I discussed the plan of care with the patient's niece at bedside who recommended DNI/DNR.  --Palliative was consulted, pending evaluation    --Continue aspiration precaution, fall precaution, seizure precaution. Head of the bed at 35 degree; monitor hemoglobin  hematocrit    6/27:  --No acute events overnight.  Patient is noted to have mild bloody secretions coming out from oral cavity while suctioning.  Hemoglobin hematocrit is stable.  I called patient's niece at [phone], left voice message, unable to get a hold of the patient family member  --Palliative called regarding goals of care discussion  --Vital signs are stable, currently on 4 L of oxygen, very high risk of aspiration, continue aspiration precaution   -Sodium improving at 147, hemoglobin hematocrit has only stable   -Continue free water flushes at 200 every 6 hours    6/28:  --No acute events overnight, patient is noted to be actively aspirating.  Discussed with reducing the in regards to appropriateness of niece making decision on behalf of patient.  This has been confirmed with bioethics committee on 6/22 that patient next of kin (after her brother); will be her niece.    --At this time patient was made comfort care after discussing with Niece. Discussed with BRISA Matias at bedside    6/29: Patient comfort care, comfort measures in place. Hospice consulted. He is disorientated, but denies any pain and was drinking soda with nurses assistance.    Consultants/Specialty  Critical care  Neurology  Interventional radiology  Hospice    Code Status  Comfort Care/DNR    Disposition  To be determined    Review of Systems  Review of Systems   Unable to perform ROS: Medical condition        Physical Exam  Temp:  [37.1 °C (98.7 °F)] 37.1 °C (98.7 °F)  Pulse:  [108] 108  Resp:  [16-18] 16  BP: (139)/(89) 139/89    Physical Exam  Vitals and nursing note reviewed.   Constitutional:       Appearance: He is well-developed.   HENT:      Head: Normocephalic and atraumatic.      Comments: Oral secretion  Mixed with blood   Does have  cortrak in place  Eyes:      Pupils: Pupils are equal, round, and reactive to light.   Neck:      Vascular: No JVD.      Trachea: No tracheal deviation.   Cardiovascular:      Rate and Rhythm:  Normal rate.   Pulmonary:      Effort: Pulmonary effort is normal.      Breath sounds: Rales present. No rhonchi.   Abdominal:      General: Bowel sounds are normal. There is no distension.      Palpations: Abdomen is soft.   Musculoskeletal:      Cervical back: Neck supple.   Skin:     General: Skin is warm.      Nails: There is no clubbing.   Neurological:      Mental Status: He is alert.      Cranial Nerves: No cranial nerve deficit.      Motor: Weakness (Right hemiparesis) present. No abnormal muscle tone.      Comments: Exam limited as patient does not consistently follow commands           Fluids    Intake/Output Summary (Last 24 hours) at 6/29/2021 1828  Last data filed at 6/29/2021 0800  Gross per 24 hour   Intake 120 ml   Output --   Net 120 ml       Laboratory  Recent Labs     06/27/21  0156 06/27/21  0832 06/28/21  0131 06/28/21  1032 06/28/21  1426 06/28/21  2118 06/29/21  0302   WBC 12.5*  --  17.1*  --   --   --  17.9*   RBC 4.92  --  5.33  --   --   --  5.43   HEMOGLOBIN 15.8   < > 16.9   < > 17.2 16.8 17.4   HEMATOCRIT 48.4   < > 51.8   < > 51.8 52.4* 52.9*   MCV 98.4*  --  97.2  --   --   --  97.4   MCH 32.1  --  31.7  --   --   --  32.0   MCHC 32.6*  --  32.6*  --   --   --  32.9*   RDW 46.4  --  46.7  --   --   --  47.4   PLATELETCT 170  --  197  --   --   --  232   MPV 11.0  --  10.9  --   --   --  11.2    < > = values in this interval not displayed.     Recent Labs     06/27/21  0156 06/28/21  0131 06/29/21  0302   SODIUM 147* 149* 156*   POTASSIUM 3.9 4.0 4.3   CHLORIDE 112 112 118*   CO2 25 27 25   GLUCOSE 159* 174* 140*   BUN 36* 41* 49*   CREATININE 0.78 0.83 1.02   CALCIUM 10.6* 10.8* 10.5                   Imaging  DX-CHEST-LIMITED (1 VIEW)   Final Result      1.  There is similar interstitial opacity in the lung bases possibly due to chronic scarring.   2.  There is no acute pneumonia or failure.      DX-ABDOMEN FOR TUBE PLACEMENT   Final Result      Cortrak feeding tube tip projects in  the region of the distal stomach/duodenal bulb.      DX-CHEST-PORTABLE (1 VIEW)   Final Result      Stable chest with hyperinflation and aortic ectasia      MR-BRAIN-W/O   Final Result      1.  Moderate to large area of ischemia in the LEFT frontal and parietal lobes involving the basal ganglia with scattered areas of ischemia in the LEFT posterior temporal and occipital lobes, MCA territory. The area of ischemia is much smaller than the    area of tissue at risks seen on the perfusion CT.   2.  No acute hemorrhage   3.  Small areas of encephalomalacia in the RIGHT frontal cortex and BILATERAL cerebellum   4.  Atrophy   5.  White matter changes      EC-ECHOCARDIOGRAM COMPLETE W/O CONT   Final Result      CT-HEAD W/O   Final Result      1.  Developing left frontotemporal infarct with associated cortically based low density      2.  Negative for hemorrhage      3.  Underlying cerebral volume loss and white matter change      DX-CHEST-PORTABLE (1 VIEW)   Final Result      No significant change      DX-CHEST-PORTABLE (1 VIEW)   Final Result      Radiographically satisfactory positioning of support hardware and no pneumothorax is seen      DX-ABDOMEN FOR TUBE PLACEMENT   Final Result      Feeding tube extends below the diaphragm with tip at the level of the proximal duodenum. No metallic instruments are identified.      CT-HEAD W/O   Final Result         NO ACUTE ABNORMALITIES ARE NOTED ON CT SCAN OF THE HEAD.      Findings are consistent with atrophy.  Decreased attenuation in the periventricular white matter likely indicates microvascular ischemic disease.      IR-THROMBO MECHANICAL ARTERY,INIT   Final Result         70-year-old patient who presented with acute onset of right-sided upper and lower extremity weakness and slurred speech was found to have a left M1 occlusion. Patient underwent emergent left MCA thrombectomy with complete restoration of flow to the left    MCA as described above. Minimal distal  embolization to a distal parietal M4 cortical branches noted.      Final recanalization score: TICI 2C.               DX-CHEST-PORTABLE (1 VIEW)   Final Result      No acute cardiopulmonary findings.      CT-CTA HEAD WITH & W/O-POST PROCESS   Final Result      Thrombus in the distal M1 segment of the left middle cerebral artery. Reconstitution of the M2 middle cerebral artery branches.               Comment: Results discussed with Dr. Ren at approximately 4:35 AM      CT-CTA NECK WITH & W/O-POST PROCESSING   Final Result      Atherosclerotic disease. Resulting stenosis is less than 50%      CT-CSPINE WITHOUT PLUS RECONS   Final Result      No acute fracture is identified.      CT-CEREBRAL PERFUSION ANALYSIS   Final Result      1.  Cerebral blood flow less than 30% likely representing completed infarct = 60 mL.      2.  T Max more than 6 seconds likely representing combination of completed infarct and ischemia = 231 mL.      3.  Mismatched volume likely representing ischemic brain/penumbra = 171 mL      4.  Please note that the cerebral perfusion was performed on the limited brain tissue around the basal ganglia region. Infarct/ischemia outside the CT perfusion sections can be missed in this study.      CT-HEAD W/O   Final Result      1.  No acute intracranial findings.      2.  Diffuse atrophy and periventricular white matter changes, consistent with chronic small vessel.                    Assessment/Plan    Advance care planning  Assessment & Plan  Extensive discussion with the patient with next of kin over the phone regarding patient's poor prognosis, quality of life. Discussed the patient has been actively aspirating, noted to have bloody secretion coming out from mild along with dysphagia hypernatremia.  After an extensive discussion with the niece, patient was made comfort care.    Comfort measure  Orders in place  Hospice consulted          Hypernatremia  Assessment & Plan  At 149 , continue free water  flushes 300 every 6 hrs  Now on comfort care    Excessive oral secretions  Assessment & Plan  Patient is noted to have excessive oral secretion, status post excessive suctioning.   Currently patient is noted to have recent oral secretion likely secondary to trauma from suctioning. I discussed the case with intensivist Dr Elam  Who recs monitoring    On 6/27/1021, he is worse occasionally noted to be less bloody, monitor for aspiration  Continue comfort care measure, now actively aspirating       Acute respiratory failure with hypoxia (HCC)  Assessment & Plan  Intubated for inability to protect airway underwent one-way extubation on 6/25/2021   --Aspiration precautions and RT protocol   -- On 4-5 L     Need aggressive pulmonary toileting, RT following      Continue comfort care    Type 2 diabetes mellitus, without long-term current use of insulin ([facility])  Assessment & Plan  Hemoglobin A1c 5.9  Insulin Sliding Scale discontinued due to comfort measure    Hyperlipidemia  Assessment & Plan  Atorvastatin discontinued  Comfort care measures only    Hypertension  Assessment & Plan  Monitor blood pressure    currently his blood pressure is noted to be well controlled    Continue comfort measure    Hypothyroidism  Assessment & Plan  Comfort measures only    Schizophrenia ([facility])  Assessment & Plan  Continue Zyprexa and monitor  Qtc at 418, monitor    Continue comfort care measure    * Acute ischemic stroke (HCC)  Assessment & Plan  Was not a candidate for alteplase    ---Underwent left MCA thrombectomy on 6/19/2021 continue aspirin and atorvastatin    --PT/OT/SLP , continue Aspiration precautions  Patient is DNR/DNI with no reintubation   Continue asa/statin  Family proceeded with comfort care measures 6/28  Continue comfort care measure         VTE prophylaxis: stop lovenox as patient is having bloody secretion

## 2021-06-30 NOTE — DISCHARGE SUMMARY
Discharge Summary    CHIEF COMPLAINT ON ADMISSION  No chief complaint on file.      Reason for Admission  Stroke     CODE STATUS  Comfort Care/DNR    HPI & HOSPITAL COURSE    This is 70-year-old male with a past medical history significant for COPD, hypertension, schizophrenia, diabetes mitis with hemoglobin A1c of 5.9, hypothyroidism presented to the ER on 6/20/2021 from group home after falling out of his bed and found to have right-sided weakness.  Upon presentation he is noted to be dysarthric.  He was found to have distal M1 thrombus for which he underwent thrombectomy by IR on 6/20/2021 after which he was intubated secondary to airway protection and thus was admitted to the ICU.  Patient was extubated on 6/24/2021 was transferred to medical floor.  During the stay in the hospital cardiology continue to follow the patient currently patient is on aspirin 81 mg p.o. daily, lovastatin 80 mg p.o. daily.    During the stay in the hospital, patient was noted to have a of bloody secretions, continues to aspirate actively.  Symptomatic management was provided during the stay.  After extensive discussion with the patient decision maker Jazmyne, including poor prognosis and large stroke with lack of recovery, patient was made comfort care on 6/28/2021. Hospice consulted and will be discharged to hospice care    Therefore, he is discharged in guarded and stable condition to hospice.    The patient met 2-midnight criteria for an inpatient stay at the time of discharge.      FOLLOW UP ITEMS POST DISCHARGE  Hospice to resume patient care    DISCHARGE DIAGNOSES  Principal Problem:    Acute ischemic stroke (HCC) POA: Yes  Active Problems:    Schizophrenia (HCC) POA: Yes    Hypothyroidism POA: Yes    Hypertension POA: Yes    Hyperlipidemia POA: Yes    Type 2 diabetes mellitus, without long-term current use of insulin (HCC) POA: Yes    Acute respiratory failure with hypoxia (HCC) POA: Yes    Ventricular bigeminy seen on cardiac  monitor POA: Clinically Undetermined    Excessive oral secretions POA: Unknown    Hypernatremia POA: Unknown    Hospice care POA: No  Resolved Problems:    * No resolved hospital problems. *      FOLLOW UP  No future appointments.  [facility] [address]  [place] [id number] [phone]          MEDICATIONS ON DISCHARGE     Medication List      START taking these medications      Instructions   LORazepam 2 MG/ML Conc  Commonly known as: ATIVAN   Place 0.5 mL under the tongue every 1 hour as needed (Anxiety/Restlessness) for up to 5 days.  Dose: 1 mg     morphine 20 MG/ML Soln  Commonly known as: ROXANOL   Take 0.5 mL by mouth every 1 hour as needed for up to 5 days.  Dose: 10 mg     ondansetron 8 MG Tbdp  Commonly known as: ZOFRAN ODT   Take 1 tablet by mouth every 8 hours as needed.  Dose: 8 mg        CONTINUE taking these medications      Instructions   Acetaminophen 650 MG Tabs   Take 650 mg by mouth every 6 hours as needed (Mild Pain; (Pain scale 1-3); Temp greater than 100.5 F).  Dose: 650 mg     levothyroxine 25 MCG Tabs  Commonly known as: SYNTHROID   Take 25 mcg by mouth every morning on an empty stomach.  Dose: 25 mcg     olanzapine 10 MG tablet  Commonly known as: ZYPREXA   Take 10 mg by mouth at bedtime.  Dose: 10 mg     traZODone 150 MG Tabs  Commonly known as: DESYREL   Take 1 Tab by mouth every evening.  Dose: 150 mg        STOP taking these medications    aspirin 81 MG Chew chewable tablet  Commonly known as: ASA     cyanocobalamin 1000 MCG Tabs  Commonly known as: VITAMIN B12     fenofibrate 48 MG Tabs  Commonly known as: TRICOR     folic acid 1 MG Tabs  Commonly known as: FOLVITE     Incruse Ellipta 62.5 MCG/INH Aepb  Generic drug: Umeclidinium Bromide     lisinopril 2.5 MG Tabs  Commonly known as: PRINIVIL     metFORMIN 500 MG Tabs  Commonly known as: GLUCOPHAGE     multivitamin Tabs     Spiriva HandiHaler 18 MCG Caps  Generic drug: tiotropium     thiamine 100  MG Tabs  Commonly known as: vitamin B-1     vitamin D (Ergocalciferol) 1.25 MG (46087 UT) Caps capsule  Commonly known as: DRISDOL            Allergies  No Known Allergies    DIET  Orders Placed This Encounter   Procedures   • Diet Order Diet: Regular     Standing Status:   Standing     Number of Occurrences:   1     Order Specific Question:   Diet:     Answer:   Regular [1]       ACTIVITY  As tolerated.  Weight bearing as tolerated    LINES, DRAINS, AND WOUNDS  This is an automated list. Peripheral IVs will be removed prior to discharge.  Peripheral IV 06/20/21 20 G Right Forearm (Active)   Site Assessment Clean;Dry;Intact 06/29/21 2000   Dressing Type Transparent 06/29/21 2000   Line Status Scrubbed the hub prior to access;Flushed;Saline locked 06/29/21 2000   Dressing Status Clean;Intact;Dry 06/29/21 2000   Dressing Intervention N/A 06/29/21 2000   Dressing Change Due 06/26/21 06/24/21 2000   Date Primary Tubing Changed 06/22/21 06/24/21 2000   NEXT Primary Tubing Change  06/26/21 06/25/21 0856   [facility] 0 06/29/21 2000   Phlebitis Scale ([facility] Only) 0 06/29/21 2000       Peripheral IV 06/23/21 20 G Right Upper arm (Active)   Site Assessment Clean;Dry;Intact 06/29/21 2000   Dressing Type Transparent 06/29/21 2000   Line Status Scrubbed the hub prior to access;Flushed;Saline locked 06/29/21 2000   Dressing Status Clean;Dry;Intact 06/29/21 2000   Dressing Intervention N/A 06/29/21 2000   Dressing Change Due 06/26/21 06/24/21 2000   Date Primary Tubing Changed 06/22/21 06/24/21 2000   NEXT Primary Tubing Change  06/26/21 06/25/21 0856   Infiltration Grading ([facility], [facility]) 0 06/29/21 2000   Phlebitis Scale ([facility] Only) 0 06/29/21 2000     External Urinary Catheter ([facility]) (Active)   Collection Container Standard drainage bag 06/29/21 0800   Output (mL) 1500 mL 06/30/21 0542         Peripheral IV 06/20/21 20 G Right Forearm (Active)   Site Assessment Clean;Dry;Intact 06/29/21 2000    Dressing Type Transparent 06/29/21 2000   Line Status Scrubbed the hub prior to access;Flushed;Saline locked 06/29/21 2000   Dressing Status Clean;Intact;Dry 06/29/21 2000   Dressing Intervention N/A 06/29/21 2000   Dressing Change Due 06/26/21 06/24/21 2000   Date Primary Tubing Changed 06/22/21 06/24/21 2000   NEXT Primary Tubing Change  06/26/21 06/25/21 0856   Infiltration Grading ([facility], [facility]) 0 06/29/21 2000   Phlebitis Scale ([facility] Only) 0 06/29/21 2000       Peripheral IV 06/23/21 20 G Right Upper arm (Active)   Site Assessment Clean;Dry;Intact 06/29/21 2000   Dressing Type Transparent 06/29/21 2000   Line Status Scrubbed the hub prior to access;Flushed;Saline locked 06/29/21 2000   Dressing Status Clean;Dry;Intact 06/29/21 2000   Dressing Intervention N/A 06/29/21 2000   Dressing Change Due 06/26/21 06/24/21 2000   Date Primary Tubing Changed 06/22/21 06/24/21 2000   NEXT Primary Tubing Change  06/26/21 06/25/21 0856   Infiltration Grading ([facility], [facility]) 0 06/29/21 2000   Phlebitis Scale ([facility] Only) 0 06/29/21 2000               MENTAL STATUS ON TRANSFER             CONSULTATIONS  Critical care  Neurology  Interventional radiology  Hospice    PROCEDURES  6/20: Cerebral Angiogram and L MCA mechanical thrombectomy  6/20: Intubation for airway protection    DX-CHEST-LIMITED (1 VIEW)   Final Result      1.  There is similar interstitial opacity in the lung bases possibly due to chronic scarring.   2.  There is no acute pneumonia or failure.      DX-ABDOMEN FOR TUBE PLACEMENT   Final Result      Cortrak feeding tube tip projects in the region of the distal stomach/duodenal bulb.      DX-CHEST-PORTABLE (1 VIEW)   Final Result      Stable chest with hyperinflation and aortic ectasia      MR-BRAIN-W/O   Final Result      1.  Moderate to large area of ischemia in the LEFT frontal and parietal lobes involving the basal ganglia with scattered areas of ischemia in the LEFT posterior temporal and occipital lobes,  MCA territory. The area of ischemia is much smaller than the    area of tissue at risks seen on the perfusion CT.   2.  No acute hemorrhage   3.  Small areas of encephalomalacia in the RIGHT frontal cortex and BILATERAL cerebellum   4.  Atrophy   5.  White matter changes      EC-ECHOCARDIOGRAM COMPLETE W/O CONT   Final Result      CT-HEAD W/O   Final Result      1.  Developing left frontotemporal infarct with associated cortically based low density      2.  Negative for hemorrhage      3.  Underlying cerebral volume loss and white matter change      DX-CHEST-PORTABLE (1 VIEW)   Final Result      No significant change      DX-CHEST-PORTABLE (1 VIEW)   Final Result      Radiographically satisfactory positioning of support hardware and no pneumothorax is seen      DX-ABDOMEN FOR TUBE PLACEMENT   Final Result      Feeding tube extends below the diaphragm with tip at the level of the proximal duodenum. No metallic instruments are identified.      CT-HEAD W/O   Final Result         NO ACUTE ABNORMALITIES ARE NOTED ON CT SCAN OF THE HEAD.      Findings are consistent with atrophy.  Decreased attenuation in the periventricular white matter likely indicates microvascular ischemic disease.      IR-THROMBO MECHANICAL ARTERY,INIT   Final Result         70-year-old patient who presented with acute onset of right-sided upper and lower extremity weakness and slurred speech was found to have a left M1 occlusion. Patient underwent emergent left MCA thrombectomy with complete restoration of flow to the left    MCA as described above. Minimal distal embolization to a distal parietal M4 cortical branches noted.      Final recanalization score: TICI 2C.               DX-CHEST-PORTABLE (1 VIEW)   Final Result      No acute cardiopulmonary findings.      CT-CTA HEAD WITH & W/O-POST PROCESS   Final Result      Thrombus in the distal M1 segment of the left middle cerebral artery. Reconstitution of the M2 middle cerebral artery branches.                Comment: Results discussed with Dr. Ren at approximately 4:35 AM      CT-CTA NECK WITH & W/O-POST PROCESSING   Final Result      Atherosclerotic disease. Resulting stenosis is less than 50%      CT-CSPINE WITHOUT PLUS RECONS   Final Result      No acute fracture is identified.      CT-CEREBRAL PERFUSION ANALYSIS   Final Result      1.  Cerebral blood flow less than 30% likely representing completed infarct = 60 mL.      2.  T Max more than 6 seconds likely representing combination of completed infarct and ischemia = 231 mL.      3.  Mismatched volume likely representing ischemic brain/penumbra = 171 mL      4.  Please note that the cerebral perfusion was performed on the limited brain tissue around the basal ganglia region. Infarct/ischemia outside the CT perfusion sections can be missed in this study.      CT-HEAD W/O   Final Result      1.  No acute intracranial findings.      2.  Diffuse atrophy and periventricular white matter changes, consistent with chronic small vessel.                     LABORATORY  Lab Results   Component Value Date    SODIUM 156 (HH) 06/29/2021    POTASSIUM 4.3 06/29/2021    CHLORIDE 118 (H) 06/29/2021    CO2 25 06/29/2021    GLUCOSE 140 (H) 06/29/2021    BUN 49 (H) 06/29/2021    CREATININE 1.02 06/29/2021        Lab Results   Component Value Date    WBC 17.9 (H) 06/29/2021    HEMOGLOBIN 17.4 06/29/2021    HEMATOCRIT 52.9 (H) 06/29/2021    PLATELETCT 232 06/29/2021        Total time of the discharge process exceeds 32 minutes.

## 2021-06-30 NOTE — DISCHARGE PLANNING
Received Transport Form @ [id number]  Spoke to Marjan @ [facility]. #[id number].  Spoke to Eduar @ [facility].    Transport is scheduled for 6/30 @1000 going [facility] ().    BS RN and RN CM notified of scheduled transport via [employer] @2927.

## 2021-06-30 NOTE — DISCHARGE PLANNING
Anticipated Discharge Disposition:   [facility] group home  [facility]    Action:    Pt accepted by [facility] per Vane [phone].  DME delivered yesterday evening to group [place].  Hospice RN will be at group home today at 1030.    RN CM spoke with patient's niece, Jazmyne and informed her that [facility] accepted.  Vane will contact her this a.m.  Planning 1000 dc today via [employer].  Jazmyne agreeable.    Group Home owner, Sarina contacted this a.m. [phone] and informed of 1000 transport today via [employer] planned.  She stated this was a good time and would alert staff.    Face sheet, [employer] and transport forms faxed to [facility].    Barriers to Discharge:    None    Plan:    Wait for transportation confirmation.

## 2021-09-14 ENCOUNTER — TELEPHONE (OUTPATIENT)
Dept: PHYSICAL THERAPY | Facility: MEDICAL CENTER | Age: 71
End: 2021-09-14

## 2021-09-14 NOTE — THERAPY
Attempted to call patient without success due to the phone numbers listed in the chart being incorrect. Modified [place] Score 7.